# Patient Record
Sex: FEMALE | Race: WHITE | Employment: OTHER | URBAN - NONMETROPOLITAN AREA
[De-identification: names, ages, dates, MRNs, and addresses within clinical notes are randomized per-mention and may not be internally consistent; named-entity substitution may affect disease eponyms.]

---

## 2017-01-12 DIAGNOSIS — J44.9 COPD (CHRONIC OBSTRUCTIVE PULMONARY DISEASE) (HCC): ICD-10-CM

## 2017-01-13 RX ORDER — BUDESONIDE AND FORMOTEROL FUMARATE DIHYDRATE 160; 4.5 UG/1; UG/1
AEROSOL RESPIRATORY (INHALATION)
Qty: 10.2 INHALER | Refills: 5 | Status: SHIPPED | OUTPATIENT
Start: 2017-01-13 | End: 2017-01-25 | Stop reason: SDUPTHER

## 2017-01-25 ENCOUNTER — OFFICE VISIT (OUTPATIENT)
Dept: FAMILY MEDICINE CLINIC | Age: 67
End: 2017-01-25
Payer: MEDICARE

## 2017-01-25 VITALS
TEMPERATURE: 98.3 F | SYSTOLIC BLOOD PRESSURE: 97 MMHG | DIASTOLIC BLOOD PRESSURE: 58 MMHG | HEART RATE: 86 BPM | OXYGEN SATURATION: 98 % | BODY MASS INDEX: 16.39 KG/M2 | RESPIRATION RATE: 16 BRPM | WEIGHT: 76 LBS | HEIGHT: 57 IN

## 2017-01-25 DIAGNOSIS — M54.9 CHRONIC BACK PAIN, UNSPECIFIED BACK LOCATION, UNSPECIFIED BACK PAIN LATERALITY: Primary | ICD-10-CM

## 2017-01-25 DIAGNOSIS — R64 CACHEXIA (HCC): ICD-10-CM

## 2017-01-25 DIAGNOSIS — J41.1 MUCOPURULENT CHRONIC BRONCHITIS (HCC): ICD-10-CM

## 2017-01-25 DIAGNOSIS — G89.29 CHRONIC BACK PAIN, UNSPECIFIED BACK LOCATION, UNSPECIFIED BACK PAIN LATERALITY: Primary | ICD-10-CM

## 2017-01-25 PROCEDURE — G8400 PT W/DXA NO RESULTS DOC: HCPCS | Performed by: FAMILY MEDICINE

## 2017-01-25 PROCEDURE — 3017F COLORECTAL CA SCREEN DOC REV: CPT | Performed by: FAMILY MEDICINE

## 2017-01-25 PROCEDURE — G8926 SPIRO NO PERF OR DOC: HCPCS | Performed by: FAMILY MEDICINE

## 2017-01-25 PROCEDURE — 1090F PRES/ABSN URINE INCON ASSESS: CPT | Performed by: FAMILY MEDICINE

## 2017-01-25 PROCEDURE — G8598 ASA/ANTIPLAT THER USED: HCPCS | Performed by: FAMILY MEDICINE

## 2017-01-25 PROCEDURE — 4040F PNEUMOC VAC/ADMIN/RCVD: CPT | Performed by: FAMILY MEDICINE

## 2017-01-25 PROCEDURE — G8419 CALC BMI OUT NRM PARAM NOF/U: HCPCS | Performed by: FAMILY MEDICINE

## 2017-01-25 PROCEDURE — G8484 FLU IMMUNIZE NO ADMIN: HCPCS | Performed by: FAMILY MEDICINE

## 2017-01-25 PROCEDURE — 1123F ACP DISCUSS/DSCN MKR DOCD: CPT | Performed by: FAMILY MEDICINE

## 2017-01-25 PROCEDURE — G8427 DOCREV CUR MEDS BY ELIG CLIN: HCPCS | Performed by: FAMILY MEDICINE

## 2017-01-25 PROCEDURE — 3014F SCREEN MAMMO DOC REV: CPT | Performed by: FAMILY MEDICINE

## 2017-01-25 PROCEDURE — 4004F PT TOBACCO SCREEN RCVD TLK: CPT | Performed by: FAMILY MEDICINE

## 2017-01-25 PROCEDURE — 99214 OFFICE O/P EST MOD 30 MIN: CPT | Performed by: FAMILY MEDICINE

## 2017-01-25 PROCEDURE — 3023F SPIROM DOC REV: CPT | Performed by: FAMILY MEDICINE

## 2017-01-25 RX ORDER — HYDROCODONE BITARTRATE AND ACETAMINOPHEN 10; 325 MG/1; MG/1
1 TABLET ORAL EVERY 8 HOURS PRN
Qty: 90 TABLET | Refills: 0 | Status: SHIPPED | OUTPATIENT
Start: 2017-01-25 | End: 2017-02-24 | Stop reason: SDUPTHER

## 2017-01-25 ASSESSMENT — ENCOUNTER SYMPTOMS
EYES NEGATIVE: 1
GASTROINTESTINAL NEGATIVE: 1
RESPIRATORY NEGATIVE: 1
ALLERGIC/IMMUNOLOGIC NEGATIVE: 1
BACK PAIN: 1

## 2017-02-06 DIAGNOSIS — E78.5 HYPERLIPIDEMIA: ICD-10-CM

## 2017-02-06 RX ORDER — SIMVASTATIN 40 MG
TABLET ORAL
Qty: 30 TABLET | Refills: 5 | Status: SHIPPED | OUTPATIENT
Start: 2017-02-06 | End: 2017-02-24 | Stop reason: SDUPTHER

## 2017-02-24 ENCOUNTER — OFFICE VISIT (OUTPATIENT)
Dept: FAMILY MEDICINE CLINIC | Age: 67
End: 2017-02-24
Payer: MEDICARE

## 2017-02-24 VITALS
WEIGHT: 77 LBS | HEART RATE: 111 BPM | TEMPERATURE: 98.9 F | RESPIRATION RATE: 16 BRPM | OXYGEN SATURATION: 93 % | DIASTOLIC BLOOD PRESSURE: 70 MMHG | BODY MASS INDEX: 16.61 KG/M2 | HEIGHT: 57 IN | SYSTOLIC BLOOD PRESSURE: 126 MMHG

## 2017-02-24 DIAGNOSIS — M54.5 CHRONIC LOW BACK PAIN, UNSPECIFIED BACK PAIN LATERALITY, WITH SCIATICA PRESENCE UNSPECIFIED: Primary | ICD-10-CM

## 2017-02-24 DIAGNOSIS — G89.29 CHRONIC LOW BACK PAIN, UNSPECIFIED BACK PAIN LATERALITY, WITH SCIATICA PRESENCE UNSPECIFIED: Primary | ICD-10-CM

## 2017-02-24 PROCEDURE — 4040F PNEUMOC VAC/ADMIN/RCVD: CPT | Performed by: FAMILY MEDICINE

## 2017-02-24 PROCEDURE — 3017F COLORECTAL CA SCREEN DOC REV: CPT | Performed by: FAMILY MEDICINE

## 2017-02-24 PROCEDURE — 1123F ACP DISCUSS/DSCN MKR DOCD: CPT | Performed by: FAMILY MEDICINE

## 2017-02-24 PROCEDURE — G8400 PT W/DXA NO RESULTS DOC: HCPCS | Performed by: FAMILY MEDICINE

## 2017-02-24 PROCEDURE — G8427 DOCREV CUR MEDS BY ELIG CLIN: HCPCS | Performed by: FAMILY MEDICINE

## 2017-02-24 PROCEDURE — G8598 ASA/ANTIPLAT THER USED: HCPCS | Performed by: FAMILY MEDICINE

## 2017-02-24 PROCEDURE — 1090F PRES/ABSN URINE INCON ASSESS: CPT | Performed by: FAMILY MEDICINE

## 2017-02-24 PROCEDURE — 99213 OFFICE O/P EST LOW 20 MIN: CPT | Performed by: FAMILY MEDICINE

## 2017-02-24 PROCEDURE — 4004F PT TOBACCO SCREEN RCVD TLK: CPT | Performed by: FAMILY MEDICINE

## 2017-02-24 PROCEDURE — G8419 CALC BMI OUT NRM PARAM NOF/U: HCPCS | Performed by: FAMILY MEDICINE

## 2017-02-24 PROCEDURE — 3014F SCREEN MAMMO DOC REV: CPT | Performed by: FAMILY MEDICINE

## 2017-02-24 PROCEDURE — G8484 FLU IMMUNIZE NO ADMIN: HCPCS | Performed by: FAMILY MEDICINE

## 2017-02-24 RX ORDER — HYDROCODONE BITARTRATE AND ACETAMINOPHEN 10; 325 MG/1; MG/1
1 TABLET ORAL EVERY 8 HOURS PRN
Qty: 90 TABLET | Refills: 0 | Status: SHIPPED | OUTPATIENT
Start: 2017-02-24 | End: 2017-03-23 | Stop reason: SDUPTHER

## 2017-02-24 ASSESSMENT — ENCOUNTER SYMPTOMS
ALLERGIC/IMMUNOLOGIC NEGATIVE: 1
BACK PAIN: 1
RESPIRATORY NEGATIVE: 1
GASTROINTESTINAL NEGATIVE: 1
EYES NEGATIVE: 1

## 2017-03-23 ENCOUNTER — OFFICE VISIT (OUTPATIENT)
Dept: FAMILY MEDICINE CLINIC | Age: 67
End: 2017-03-23
Payer: MEDICARE

## 2017-03-23 VITALS
TEMPERATURE: 98 F | BODY MASS INDEX: 16.66 KG/M2 | WEIGHT: 77 LBS | SYSTOLIC BLOOD PRESSURE: 132 MMHG | HEART RATE: 83 BPM | DIASTOLIC BLOOD PRESSURE: 70 MMHG | OXYGEN SATURATION: 95 %

## 2017-03-23 DIAGNOSIS — M54.5 CHRONIC LOW BACK PAIN, UNSPECIFIED BACK PAIN LATERALITY, WITH SCIATICA PRESENCE UNSPECIFIED: Primary | ICD-10-CM

## 2017-03-23 DIAGNOSIS — G89.29 CHRONIC LOW BACK PAIN, UNSPECIFIED BACK PAIN LATERALITY, WITH SCIATICA PRESENCE UNSPECIFIED: Primary | ICD-10-CM

## 2017-03-23 PROCEDURE — G8484 FLU IMMUNIZE NO ADMIN: HCPCS | Performed by: FAMILY MEDICINE

## 2017-03-23 PROCEDURE — G8598 ASA/ANTIPLAT THER USED: HCPCS | Performed by: FAMILY MEDICINE

## 2017-03-23 PROCEDURE — G8427 DOCREV CUR MEDS BY ELIG CLIN: HCPCS | Performed by: FAMILY MEDICINE

## 2017-03-23 PROCEDURE — 3017F COLORECTAL CA SCREEN DOC REV: CPT | Performed by: FAMILY MEDICINE

## 2017-03-23 PROCEDURE — 1090F PRES/ABSN URINE INCON ASSESS: CPT | Performed by: FAMILY MEDICINE

## 2017-03-23 PROCEDURE — 1123F ACP DISCUSS/DSCN MKR DOCD: CPT | Performed by: FAMILY MEDICINE

## 2017-03-23 PROCEDURE — 4040F PNEUMOC VAC/ADMIN/RCVD: CPT | Performed by: FAMILY MEDICINE

## 2017-03-23 PROCEDURE — G8400 PT W/DXA NO RESULTS DOC: HCPCS | Performed by: FAMILY MEDICINE

## 2017-03-23 PROCEDURE — 4004F PT TOBACCO SCREEN RCVD TLK: CPT | Performed by: FAMILY MEDICINE

## 2017-03-23 PROCEDURE — G8419 CALC BMI OUT NRM PARAM NOF/U: HCPCS | Performed by: FAMILY MEDICINE

## 2017-03-23 PROCEDURE — 99213 OFFICE O/P EST LOW 20 MIN: CPT | Performed by: FAMILY MEDICINE

## 2017-03-23 PROCEDURE — 3014F SCREEN MAMMO DOC REV: CPT | Performed by: FAMILY MEDICINE

## 2017-03-23 RX ORDER — HYDROCODONE BITARTRATE AND ACETAMINOPHEN 10; 325 MG/1; MG/1
1 TABLET ORAL EVERY 8 HOURS PRN
Qty: 90 TABLET | Refills: 0 | Status: SHIPPED | OUTPATIENT
Start: 2017-03-23 | End: 2017-04-21 | Stop reason: SDUPTHER

## 2017-03-23 ASSESSMENT — ENCOUNTER SYMPTOMS
EYES NEGATIVE: 1
BACK PAIN: 1
RESPIRATORY NEGATIVE: 1
ALLERGIC/IMMUNOLOGIC NEGATIVE: 1
GASTROINTESTINAL NEGATIVE: 1

## 2017-04-18 RX ORDER — CELECOXIB 200 MG/1
CAPSULE ORAL
Qty: 90 CAPSULE | Refills: 0 | Status: SHIPPED | OUTPATIENT
Start: 2017-04-18 | End: 2017-07-16 | Stop reason: SDUPTHER

## 2017-04-21 ENCOUNTER — OFFICE VISIT (OUTPATIENT)
Dept: FAMILY MEDICINE CLINIC | Age: 67
End: 2017-04-21
Payer: MEDICARE

## 2017-04-21 VITALS
TEMPERATURE: 98.5 F | SYSTOLIC BLOOD PRESSURE: 138 MMHG | WEIGHT: 78 LBS | DIASTOLIC BLOOD PRESSURE: 80 MMHG | HEART RATE: 87 BPM | OXYGEN SATURATION: 98 % | BODY MASS INDEX: 16.88 KG/M2

## 2017-04-21 DIAGNOSIS — M54.5 CHRONIC LOW BACK PAIN, UNSPECIFIED BACK PAIN LATERALITY, WITH SCIATICA PRESENCE UNSPECIFIED: Primary | ICD-10-CM

## 2017-04-21 DIAGNOSIS — G89.29 CHRONIC LOW BACK PAIN, UNSPECIFIED BACK PAIN LATERALITY, WITH SCIATICA PRESENCE UNSPECIFIED: Primary | ICD-10-CM

## 2017-04-21 DIAGNOSIS — J30.2 SEASONAL ALLERGIC RHINITIS, UNSPECIFIED ALLERGIC RHINITIS TRIGGER: ICD-10-CM

## 2017-04-21 PROCEDURE — G8400 PT W/DXA NO RESULTS DOC: HCPCS | Performed by: FAMILY MEDICINE

## 2017-04-21 PROCEDURE — 99214 OFFICE O/P EST MOD 30 MIN: CPT | Performed by: FAMILY MEDICINE

## 2017-04-21 PROCEDURE — 4040F PNEUMOC VAC/ADMIN/RCVD: CPT | Performed by: FAMILY MEDICINE

## 2017-04-21 PROCEDURE — 3014F SCREEN MAMMO DOC REV: CPT | Performed by: FAMILY MEDICINE

## 2017-04-21 PROCEDURE — 3017F COLORECTAL CA SCREEN DOC REV: CPT | Performed by: FAMILY MEDICINE

## 2017-04-21 PROCEDURE — 4004F PT TOBACCO SCREEN RCVD TLK: CPT | Performed by: FAMILY MEDICINE

## 2017-04-21 PROCEDURE — G8598 ASA/ANTIPLAT THER USED: HCPCS | Performed by: FAMILY MEDICINE

## 2017-04-21 PROCEDURE — G8427 DOCREV CUR MEDS BY ELIG CLIN: HCPCS | Performed by: FAMILY MEDICINE

## 2017-04-21 PROCEDURE — 1123F ACP DISCUSS/DSCN MKR DOCD: CPT | Performed by: FAMILY MEDICINE

## 2017-04-21 PROCEDURE — G8419 CALC BMI OUT NRM PARAM NOF/U: HCPCS | Performed by: FAMILY MEDICINE

## 2017-04-21 PROCEDURE — 1090F PRES/ABSN URINE INCON ASSESS: CPT | Performed by: FAMILY MEDICINE

## 2017-04-21 RX ORDER — HYDROCODONE BITARTRATE AND ACETAMINOPHEN 10; 325 MG/1; MG/1
1 TABLET ORAL EVERY 8 HOURS PRN
Qty: 90 TABLET | Refills: 0 | Status: SHIPPED | OUTPATIENT
Start: 2017-04-21 | End: 2017-05-22 | Stop reason: SDUPTHER

## 2017-04-21 ASSESSMENT — ENCOUNTER SYMPTOMS
EYES NEGATIVE: 1
ALLERGIC/IMMUNOLOGIC NEGATIVE: 1
RESPIRATORY NEGATIVE: 1
VOMITING: 0
GASTROINTESTINAL NEGATIVE: 1
NAUSEA: 0
BACK PAIN: 1

## 2017-05-10 DIAGNOSIS — J44.9 COPD (CHRONIC OBSTRUCTIVE PULMONARY DISEASE) (HCC): ICD-10-CM

## 2017-05-10 RX ORDER — ROFLUMILAST 500 UG/1
TABLET ORAL
Qty: 30 TABLET | Refills: 5 | Status: SHIPPED | OUTPATIENT
Start: 2017-05-10 | End: 2017-07-19 | Stop reason: SDUPTHER

## 2017-05-22 ENCOUNTER — OFFICE VISIT (OUTPATIENT)
Dept: FAMILY MEDICINE CLINIC | Age: 67
End: 2017-05-22
Payer: MEDICARE

## 2017-05-22 VITALS
BODY MASS INDEX: 16.83 KG/M2 | WEIGHT: 78 LBS | DIASTOLIC BLOOD PRESSURE: 88 MMHG | TEMPERATURE: 98.3 F | SYSTOLIC BLOOD PRESSURE: 126 MMHG | HEART RATE: 82 BPM | RESPIRATION RATE: 18 BRPM | HEIGHT: 57 IN | OXYGEN SATURATION: 97 %

## 2017-05-22 DIAGNOSIS — J44.1 COPD WITH ACUTE EXACERBATION (HCC): ICD-10-CM

## 2017-05-22 DIAGNOSIS — M54.5 CHRONIC LOW BACK PAIN, UNSPECIFIED BACK PAIN LATERALITY, WITH SCIATICA PRESENCE UNSPECIFIED: Primary | ICD-10-CM

## 2017-05-22 DIAGNOSIS — G89.29 CHRONIC LOW BACK PAIN, UNSPECIFIED BACK PAIN LATERALITY, WITH SCIATICA PRESENCE UNSPECIFIED: Primary | ICD-10-CM

## 2017-05-22 PROCEDURE — G8926 SPIRO NO PERF OR DOC: HCPCS | Performed by: FAMILY MEDICINE

## 2017-05-22 PROCEDURE — 4040F PNEUMOC VAC/ADMIN/RCVD: CPT | Performed by: FAMILY MEDICINE

## 2017-05-22 PROCEDURE — G8427 DOCREV CUR MEDS BY ELIG CLIN: HCPCS | Performed by: FAMILY MEDICINE

## 2017-05-22 PROCEDURE — G8400 PT W/DXA NO RESULTS DOC: HCPCS | Performed by: FAMILY MEDICINE

## 2017-05-22 PROCEDURE — 99214 OFFICE O/P EST MOD 30 MIN: CPT | Performed by: FAMILY MEDICINE

## 2017-05-22 PROCEDURE — 1090F PRES/ABSN URINE INCON ASSESS: CPT | Performed by: FAMILY MEDICINE

## 2017-05-22 PROCEDURE — 4004F PT TOBACCO SCREEN RCVD TLK: CPT | Performed by: FAMILY MEDICINE

## 2017-05-22 PROCEDURE — 1123F ACP DISCUSS/DSCN MKR DOCD: CPT | Performed by: FAMILY MEDICINE

## 2017-05-22 PROCEDURE — 3017F COLORECTAL CA SCREEN DOC REV: CPT | Performed by: FAMILY MEDICINE

## 2017-05-22 PROCEDURE — 3014F SCREEN MAMMO DOC REV: CPT | Performed by: FAMILY MEDICINE

## 2017-05-22 PROCEDURE — G8598 ASA/ANTIPLAT THER USED: HCPCS | Performed by: FAMILY MEDICINE

## 2017-05-22 PROCEDURE — 3023F SPIROM DOC REV: CPT | Performed by: FAMILY MEDICINE

## 2017-05-22 PROCEDURE — G8419 CALC BMI OUT NRM PARAM NOF/U: HCPCS | Performed by: FAMILY MEDICINE

## 2017-05-22 RX ORDER — AMOXICILLIN AND CLAVULANATE POTASSIUM 875; 125 MG/1; MG/1
1 TABLET, FILM COATED ORAL 2 TIMES DAILY
Qty: 20 TABLET | Refills: 0 | Status: SHIPPED | OUTPATIENT
Start: 2017-05-22 | End: 2018-02-15 | Stop reason: SDUPTHER

## 2017-05-22 RX ORDER — METHYLPREDNISOLONE 4 MG/1
TABLET ORAL
Qty: 1 KIT | Refills: 0 | Status: SHIPPED | OUTPATIENT
Start: 2017-05-22 | End: 2018-02-15 | Stop reason: SDUPTHER

## 2017-05-22 RX ORDER — HYDROCODONE BITARTRATE AND ACETAMINOPHEN 10; 325 MG/1; MG/1
1 TABLET ORAL EVERY 8 HOURS PRN
Qty: 90 TABLET | Refills: 0 | Status: SHIPPED | OUTPATIENT
Start: 2017-05-22 | End: 2017-06-21 | Stop reason: SDUPTHER

## 2017-05-22 ASSESSMENT — COPD QUESTIONNAIRES: COPD: 1

## 2017-05-22 ASSESSMENT — ENCOUNTER SYMPTOMS
ALLERGIC/IMMUNOLOGIC NEGATIVE: 1
DIFFICULTY BREATHING: 1
FREQUENT THROAT CLEARING: 1
VOMITING: 0
EYES NEGATIVE: 1
WHEEZING: 1
CHEST TIGHTNESS: 1
BACK PAIN: 1
GASTROINTESTINAL NEGATIVE: 1
COUGH: 1
SPUTUM PRODUCTION: 1
SHORTNESS OF BREATH: 1
NAUSEA: 0

## 2017-06-07 DIAGNOSIS — I10 ESSENTIAL HYPERTENSION: ICD-10-CM

## 2017-06-13 DIAGNOSIS — J30.9 ALLERGIC RHINITIS, UNSPECIFIED ALLERGIC RHINITIS TRIGGER, UNSPECIFIED RHINITIS SEASONALITY: ICD-10-CM

## 2017-06-13 RX ORDER — AZELASTINE 1 MG/ML
SPRAY, METERED NASAL
Qty: 1 BOTTLE | Refills: 5 | Status: ON HOLD | OUTPATIENT
Start: 2017-06-13 | End: 2017-10-29

## 2017-06-21 ENCOUNTER — OFFICE VISIT (OUTPATIENT)
Dept: FAMILY MEDICINE CLINIC | Age: 67
End: 2017-06-21
Payer: MEDICARE

## 2017-06-21 VITALS
OXYGEN SATURATION: 96 % | WEIGHT: 78 LBS | TEMPERATURE: 98.7 F | BODY MASS INDEX: 16.88 KG/M2 | SYSTOLIC BLOOD PRESSURE: 110 MMHG | HEART RATE: 86 BPM | DIASTOLIC BLOOD PRESSURE: 60 MMHG

## 2017-06-21 DIAGNOSIS — G89.29 CHRONIC LOW BACK PAIN, UNSPECIFIED BACK PAIN LATERALITY, WITH SCIATICA PRESENCE UNSPECIFIED: Primary | ICD-10-CM

## 2017-06-21 DIAGNOSIS — M54.5 CHRONIC LOW BACK PAIN, UNSPECIFIED BACK PAIN LATERALITY, WITH SCIATICA PRESENCE UNSPECIFIED: Primary | ICD-10-CM

## 2017-06-21 PROCEDURE — 4004F PT TOBACCO SCREEN RCVD TLK: CPT | Performed by: FAMILY MEDICINE

## 2017-06-21 PROCEDURE — 1090F PRES/ABSN URINE INCON ASSESS: CPT | Performed by: FAMILY MEDICINE

## 2017-06-21 PROCEDURE — 99213 OFFICE O/P EST LOW 20 MIN: CPT | Performed by: FAMILY MEDICINE

## 2017-06-21 PROCEDURE — G8419 CALC BMI OUT NRM PARAM NOF/U: HCPCS | Performed by: FAMILY MEDICINE

## 2017-06-21 PROCEDURE — 1123F ACP DISCUSS/DSCN MKR DOCD: CPT | Performed by: FAMILY MEDICINE

## 2017-06-21 PROCEDURE — G8400 PT W/DXA NO RESULTS DOC: HCPCS | Performed by: FAMILY MEDICINE

## 2017-06-21 PROCEDURE — G8427 DOCREV CUR MEDS BY ELIG CLIN: HCPCS | Performed by: FAMILY MEDICINE

## 2017-06-21 PROCEDURE — 4040F PNEUMOC VAC/ADMIN/RCVD: CPT | Performed by: FAMILY MEDICINE

## 2017-06-21 PROCEDURE — 3017F COLORECTAL CA SCREEN DOC REV: CPT | Performed by: FAMILY MEDICINE

## 2017-06-21 PROCEDURE — G8598 ASA/ANTIPLAT THER USED: HCPCS | Performed by: FAMILY MEDICINE

## 2017-06-21 PROCEDURE — 3014F SCREEN MAMMO DOC REV: CPT | Performed by: FAMILY MEDICINE

## 2017-06-21 RX ORDER — HYDROCODONE BITARTRATE AND ACETAMINOPHEN 10; 325 MG/1; MG/1
1 TABLET ORAL EVERY 8 HOURS PRN
Qty: 90 TABLET | Refills: 0 | Status: SHIPPED | OUTPATIENT
Start: 2017-06-21 | End: 2017-07-19 | Stop reason: SDUPTHER

## 2017-06-21 ASSESSMENT — ENCOUNTER SYMPTOMS
GASTROINTESTINAL NEGATIVE: 1
ALLERGIC/IMMUNOLOGIC NEGATIVE: 1
COUGH: 1
BACK PAIN: 1
EYES NEGATIVE: 1

## 2017-07-06 DIAGNOSIS — E78.5 HYPERLIPIDEMIA: ICD-10-CM

## 2017-07-06 RX ORDER — SIMVASTATIN 40 MG
TABLET ORAL
Qty: 30 TABLET | Refills: 5 | Status: SHIPPED | OUTPATIENT
Start: 2017-07-06 | End: 2017-08-17 | Stop reason: SDUPTHER

## 2017-07-06 RX ORDER — BUDESONIDE AND FORMOTEROL FUMARATE DIHYDRATE 160; 4.5 UG/1; UG/1
AEROSOL RESPIRATORY (INHALATION)
Qty: 10.2 INHALER | Refills: 5 | Status: SHIPPED | OUTPATIENT
Start: 2017-07-06 | End: 2018-05-15 | Stop reason: SDUPTHER

## 2017-07-14 DIAGNOSIS — J44.9 ASTHMA WITH COPD (CHRONIC OBSTRUCTIVE PULMONARY DISEASE) (HCC): ICD-10-CM

## 2017-07-14 RX ORDER — ALBUTEROL SULFATE 90 UG/1
2 AEROSOL, METERED RESPIRATORY (INHALATION) EVERY 6 HOURS PRN
Qty: 25.5 INHALER | Refills: 5 | Status: SHIPPED | OUTPATIENT
Start: 2017-07-14 | End: 2018-03-08 | Stop reason: SDUPTHER

## 2017-07-17 RX ORDER — CELECOXIB 200 MG/1
CAPSULE ORAL
Qty: 90 CAPSULE | Refills: 3 | Status: ON HOLD | OUTPATIENT
Start: 2017-07-17 | End: 2017-11-04 | Stop reason: HOSPADM

## 2017-07-19 ENCOUNTER — OFFICE VISIT (OUTPATIENT)
Dept: FAMILY MEDICINE CLINIC | Age: 67
End: 2017-07-19
Payer: MEDICARE

## 2017-07-19 VITALS
BODY MASS INDEX: 16.88 KG/M2 | DIASTOLIC BLOOD PRESSURE: 66 MMHG | WEIGHT: 78 LBS | SYSTOLIC BLOOD PRESSURE: 118 MMHG | TEMPERATURE: 98.8 F | OXYGEN SATURATION: 98 % | HEART RATE: 86 BPM

## 2017-07-19 DIAGNOSIS — M54.5 CHRONIC LOW BACK PAIN, UNSPECIFIED BACK PAIN LATERALITY, WITH SCIATICA PRESENCE UNSPECIFIED: Primary | ICD-10-CM

## 2017-07-19 DIAGNOSIS — G89.29 CHRONIC LOW BACK PAIN, UNSPECIFIED BACK PAIN LATERALITY, WITH SCIATICA PRESENCE UNSPECIFIED: Primary | ICD-10-CM

## 2017-07-19 DIAGNOSIS — J44.9 CHRONIC OBSTRUCTIVE PULMONARY DISEASE, UNSPECIFIED COPD TYPE (HCC): ICD-10-CM

## 2017-07-19 PROCEDURE — G8400 PT W/DXA NO RESULTS DOC: HCPCS | Performed by: FAMILY MEDICINE

## 2017-07-19 PROCEDURE — 1123F ACP DISCUSS/DSCN MKR DOCD: CPT | Performed by: FAMILY MEDICINE

## 2017-07-19 PROCEDURE — G8427 DOCREV CUR MEDS BY ELIG CLIN: HCPCS | Performed by: FAMILY MEDICINE

## 2017-07-19 PROCEDURE — 3014F SCREEN MAMMO DOC REV: CPT | Performed by: FAMILY MEDICINE

## 2017-07-19 PROCEDURE — 1090F PRES/ABSN URINE INCON ASSESS: CPT | Performed by: FAMILY MEDICINE

## 2017-07-19 PROCEDURE — G8926 SPIRO NO PERF OR DOC: HCPCS | Performed by: FAMILY MEDICINE

## 2017-07-19 PROCEDURE — G8598 ASA/ANTIPLAT THER USED: HCPCS | Performed by: FAMILY MEDICINE

## 2017-07-19 PROCEDURE — 3017F COLORECTAL CA SCREEN DOC REV: CPT | Performed by: FAMILY MEDICINE

## 2017-07-19 PROCEDURE — 3023F SPIROM DOC REV: CPT | Performed by: FAMILY MEDICINE

## 2017-07-19 PROCEDURE — 4004F PT TOBACCO SCREEN RCVD TLK: CPT | Performed by: FAMILY MEDICINE

## 2017-07-19 PROCEDURE — G8419 CALC BMI OUT NRM PARAM NOF/U: HCPCS | Performed by: FAMILY MEDICINE

## 2017-07-19 PROCEDURE — 99213 OFFICE O/P EST LOW 20 MIN: CPT | Performed by: FAMILY MEDICINE

## 2017-07-19 PROCEDURE — 4040F PNEUMOC VAC/ADMIN/RCVD: CPT | Performed by: FAMILY MEDICINE

## 2017-07-19 RX ORDER — HYDROCODONE BITARTRATE AND ACETAMINOPHEN 10; 325 MG/1; MG/1
1 TABLET ORAL EVERY 8 HOURS PRN
Qty: 90 TABLET | Refills: 0 | Status: SHIPPED | OUTPATIENT
Start: 2017-07-19 | End: 2017-08-17 | Stop reason: SDUPTHER

## 2017-07-19 ASSESSMENT — COPD QUESTIONNAIRES: COPD: 1

## 2017-07-19 ASSESSMENT — ENCOUNTER SYMPTOMS
COUGH: 1
FREQUENT THROAT CLEARING: 1
ALLERGIC/IMMUNOLOGIC NEGATIVE: 1
SHORTNESS OF BREATH: 1
DIFFICULTY BREATHING: 1
WHEEZING: 1
EYES NEGATIVE: 1
SPUTUM PRODUCTION: 1
BACK PAIN: 1
GASTROINTESTINAL NEGATIVE: 1

## 2017-08-09 DIAGNOSIS — J44.9 CHRONIC OBSTRUCTIVE PULMONARY DISEASE, UNSPECIFIED COPD TYPE (HCC): ICD-10-CM

## 2017-08-09 RX ORDER — TIOTROPIUM BROMIDE 18 UG/1
CAPSULE ORAL; RESPIRATORY (INHALATION)
Qty: 30 CAPSULE | Refills: 5 | Status: SHIPPED | OUTPATIENT
Start: 2017-08-09 | End: 2018-03-16 | Stop reason: SDUPTHER

## 2017-08-17 ENCOUNTER — OFFICE VISIT (OUTPATIENT)
Dept: FAMILY MEDICINE CLINIC | Age: 67
End: 2017-08-17
Payer: MEDICARE

## 2017-08-17 VITALS
BODY MASS INDEX: 16.61 KG/M2 | OXYGEN SATURATION: 93 % | SYSTOLIC BLOOD PRESSURE: 102 MMHG | TEMPERATURE: 98.2 F | RESPIRATION RATE: 18 BRPM | HEART RATE: 82 BPM | DIASTOLIC BLOOD PRESSURE: 72 MMHG | WEIGHT: 77 LBS | HEIGHT: 57 IN

## 2017-08-17 DIAGNOSIS — M54.5 CHRONIC LOW BACK PAIN, UNSPECIFIED BACK PAIN LATERALITY, WITH SCIATICA PRESENCE UNSPECIFIED: Primary | ICD-10-CM

## 2017-08-17 DIAGNOSIS — J44.9 ASTHMA WITH COPD (CHRONIC OBSTRUCTIVE PULMONARY DISEASE) (HCC): ICD-10-CM

## 2017-08-17 DIAGNOSIS — G89.29 CHRONIC LOW BACK PAIN, UNSPECIFIED BACK PAIN LATERALITY, WITH SCIATICA PRESENCE UNSPECIFIED: Primary | ICD-10-CM

## 2017-08-17 PROCEDURE — 99213 OFFICE O/P EST LOW 20 MIN: CPT | Performed by: FAMILY MEDICINE

## 2017-08-17 PROCEDURE — G8926 SPIRO NO PERF OR DOC: HCPCS | Performed by: FAMILY MEDICINE

## 2017-08-17 PROCEDURE — G8427 DOCREV CUR MEDS BY ELIG CLIN: HCPCS | Performed by: FAMILY MEDICINE

## 2017-08-17 PROCEDURE — G8598 ASA/ANTIPLAT THER USED: HCPCS | Performed by: FAMILY MEDICINE

## 2017-08-17 PROCEDURE — 4004F PT TOBACCO SCREEN RCVD TLK: CPT | Performed by: FAMILY MEDICINE

## 2017-08-17 PROCEDURE — 1123F ACP DISCUSS/DSCN MKR DOCD: CPT | Performed by: FAMILY MEDICINE

## 2017-08-17 PROCEDURE — 3023F SPIROM DOC REV: CPT | Performed by: FAMILY MEDICINE

## 2017-08-17 PROCEDURE — 3017F COLORECTAL CA SCREEN DOC REV: CPT | Performed by: FAMILY MEDICINE

## 2017-08-17 PROCEDURE — 3014F SCREEN MAMMO DOC REV: CPT | Performed by: FAMILY MEDICINE

## 2017-08-17 PROCEDURE — G8400 PT W/DXA NO RESULTS DOC: HCPCS | Performed by: FAMILY MEDICINE

## 2017-08-17 PROCEDURE — 4040F PNEUMOC VAC/ADMIN/RCVD: CPT | Performed by: FAMILY MEDICINE

## 2017-08-17 PROCEDURE — G8419 CALC BMI OUT NRM PARAM NOF/U: HCPCS | Performed by: FAMILY MEDICINE

## 2017-08-17 PROCEDURE — 1090F PRES/ABSN URINE INCON ASSESS: CPT | Performed by: FAMILY MEDICINE

## 2017-08-17 RX ORDER — ALBUTEROL SULFATE 2.5 MG/3ML
2.5 SOLUTION RESPIRATORY (INHALATION) EVERY 6 HOURS PRN
Qty: 120 EACH | Refills: 5 | Status: SHIPPED | OUTPATIENT
Start: 2017-08-17 | End: 2018-05-15 | Stop reason: SDUPTHER

## 2017-08-17 RX ORDER — HYDROCODONE BITARTRATE AND ACETAMINOPHEN 10; 325 MG/1; MG/1
1 TABLET ORAL EVERY 8 HOURS PRN
Qty: 90 TABLET | Refills: 0 | Status: SHIPPED | OUTPATIENT
Start: 2017-08-17 | End: 2017-09-15 | Stop reason: SDUPTHER

## 2017-08-17 ASSESSMENT — ENCOUNTER SYMPTOMS
GASTROINTESTINAL NEGATIVE: 1
BACK PAIN: 1
EYES NEGATIVE: 1
ALLERGIC/IMMUNOLOGIC NEGATIVE: 1

## 2017-09-15 ENCOUNTER — OFFICE VISIT (OUTPATIENT)
Dept: FAMILY MEDICINE CLINIC | Age: 67
End: 2017-09-15
Payer: MEDICARE

## 2017-09-15 VITALS
SYSTOLIC BLOOD PRESSURE: 118 MMHG | BODY MASS INDEX: 16.4 KG/M2 | HEART RATE: 79 BPM | OXYGEN SATURATION: 96 % | DIASTOLIC BLOOD PRESSURE: 76 MMHG | TEMPERATURE: 98.1 F | WEIGHT: 75.8 LBS

## 2017-09-15 DIAGNOSIS — M54.5 CHRONIC LOW BACK PAIN, UNSPECIFIED BACK PAIN LATERALITY, WITH SCIATICA PRESENCE UNSPECIFIED: Primary | ICD-10-CM

## 2017-09-15 DIAGNOSIS — G89.29 CHRONIC LOW BACK PAIN, UNSPECIFIED BACK PAIN LATERALITY, WITH SCIATICA PRESENCE UNSPECIFIED: Primary | ICD-10-CM

## 2017-09-15 DIAGNOSIS — J44.9 ASTHMA WITH COPD (CHRONIC OBSTRUCTIVE PULMONARY DISEASE) (HCC): ICD-10-CM

## 2017-09-15 DIAGNOSIS — F32.0 MILD MAJOR DEPRESSION, SINGLE EPISODE (HCC): ICD-10-CM

## 2017-09-15 PROCEDURE — 4004F PT TOBACCO SCREEN RCVD TLK: CPT | Performed by: FAMILY MEDICINE

## 2017-09-15 PROCEDURE — G8400 PT W/DXA NO RESULTS DOC: HCPCS | Performed by: FAMILY MEDICINE

## 2017-09-15 PROCEDURE — G8926 SPIRO NO PERF OR DOC: HCPCS | Performed by: FAMILY MEDICINE

## 2017-09-15 PROCEDURE — G8427 DOCREV CUR MEDS BY ELIG CLIN: HCPCS | Performed by: FAMILY MEDICINE

## 2017-09-15 PROCEDURE — G8418 CALC BMI BLW LOW PARAM F/U: HCPCS | Performed by: FAMILY MEDICINE

## 2017-09-15 PROCEDURE — 3017F COLORECTAL CA SCREEN DOC REV: CPT | Performed by: FAMILY MEDICINE

## 2017-09-15 PROCEDURE — 1090F PRES/ABSN URINE INCON ASSESS: CPT | Performed by: FAMILY MEDICINE

## 2017-09-15 PROCEDURE — 3014F SCREEN MAMMO DOC REV: CPT | Performed by: FAMILY MEDICINE

## 2017-09-15 PROCEDURE — 4040F PNEUMOC VAC/ADMIN/RCVD: CPT | Performed by: FAMILY MEDICINE

## 2017-09-15 PROCEDURE — G8598 ASA/ANTIPLAT THER USED: HCPCS | Performed by: FAMILY MEDICINE

## 2017-09-15 PROCEDURE — 1123F ACP DISCUSS/DSCN MKR DOCD: CPT | Performed by: FAMILY MEDICINE

## 2017-09-15 PROCEDURE — 99213 OFFICE O/P EST LOW 20 MIN: CPT | Performed by: FAMILY MEDICINE

## 2017-09-15 PROCEDURE — 3023F SPIROM DOC REV: CPT | Performed by: FAMILY MEDICINE

## 2017-09-15 RX ORDER — ALBUTEROL SULFATE 2.5 MG/3ML
2.5 SOLUTION RESPIRATORY (INHALATION) EVERY 6 HOURS PRN
Qty: 120 EACH | Refills: 3 | Status: ON HOLD | OUTPATIENT
Start: 2017-09-15 | End: 2017-10-29

## 2017-09-15 RX ORDER — HYDROCODONE BITARTRATE AND ACETAMINOPHEN 10; 325 MG/1; MG/1
1 TABLET ORAL EVERY 8 HOURS PRN
Qty: 90 TABLET | Refills: 0 | Status: SHIPPED | OUTPATIENT
Start: 2017-09-15 | End: 2017-10-16 | Stop reason: SDUPTHER

## 2017-09-15 ASSESSMENT — ENCOUNTER SYMPTOMS
EYES NEGATIVE: 1
RESPIRATORY NEGATIVE: 1
BACK PAIN: 1
ALLERGIC/IMMUNOLOGIC NEGATIVE: 1
GASTROINTESTINAL NEGATIVE: 1

## 2017-10-16 ENCOUNTER — OFFICE VISIT (OUTPATIENT)
Dept: FAMILY MEDICINE CLINIC | Age: 67
End: 2017-10-16
Payer: MEDICARE

## 2017-10-16 VITALS
TEMPERATURE: 98.2 F | HEART RATE: 96 BPM | SYSTOLIC BLOOD PRESSURE: 118 MMHG | BODY MASS INDEX: 16.87 KG/M2 | HEIGHT: 57 IN | OXYGEN SATURATION: 94 % | RESPIRATION RATE: 16 BRPM | WEIGHT: 78.2 LBS | DIASTOLIC BLOOD PRESSURE: 72 MMHG

## 2017-10-16 DIAGNOSIS — E78.5 HYPERLIPIDEMIA, UNSPECIFIED HYPERLIPIDEMIA TYPE: ICD-10-CM

## 2017-10-16 DIAGNOSIS — M54.5 CHRONIC LOW BACK PAIN, UNSPECIFIED BACK PAIN LATERALITY, WITH SCIATICA PRESENCE UNSPECIFIED: Primary | ICD-10-CM

## 2017-10-16 DIAGNOSIS — Z23 NEED FOR INFLUENZA VACCINATION: ICD-10-CM

## 2017-10-16 DIAGNOSIS — G89.29 CHRONIC LOW BACK PAIN, UNSPECIFIED BACK PAIN LATERALITY, WITH SCIATICA PRESENCE UNSPECIFIED: Primary | ICD-10-CM

## 2017-10-16 PROCEDURE — 3017F COLORECTAL CA SCREEN DOC REV: CPT | Performed by: FAMILY MEDICINE

## 2017-10-16 PROCEDURE — 90662 IIV NO PRSV INCREASED AG IM: CPT | Performed by: FAMILY MEDICINE

## 2017-10-16 PROCEDURE — 1123F ACP DISCUSS/DSCN MKR DOCD: CPT | Performed by: FAMILY MEDICINE

## 2017-10-16 PROCEDURE — 4040F PNEUMOC VAC/ADMIN/RCVD: CPT | Performed by: FAMILY MEDICINE

## 2017-10-16 PROCEDURE — G8598 ASA/ANTIPLAT THER USED: HCPCS | Performed by: FAMILY MEDICINE

## 2017-10-16 PROCEDURE — G8418 CALC BMI BLW LOW PARAM F/U: HCPCS | Performed by: FAMILY MEDICINE

## 2017-10-16 PROCEDURE — G0008 ADMIN INFLUENZA VIRUS VAC: HCPCS | Performed by: FAMILY MEDICINE

## 2017-10-16 PROCEDURE — 4004F PT TOBACCO SCREEN RCVD TLK: CPT | Performed by: FAMILY MEDICINE

## 2017-10-16 PROCEDURE — G8484 FLU IMMUNIZE NO ADMIN: HCPCS | Performed by: FAMILY MEDICINE

## 2017-10-16 PROCEDURE — 3014F SCREEN MAMMO DOC REV: CPT | Performed by: FAMILY MEDICINE

## 2017-10-16 PROCEDURE — 1090F PRES/ABSN URINE INCON ASSESS: CPT | Performed by: FAMILY MEDICINE

## 2017-10-16 PROCEDURE — G8400 PT W/DXA NO RESULTS DOC: HCPCS | Performed by: FAMILY MEDICINE

## 2017-10-16 PROCEDURE — 99213 OFFICE O/P EST LOW 20 MIN: CPT | Performed by: FAMILY MEDICINE

## 2017-10-16 PROCEDURE — G8427 DOCREV CUR MEDS BY ELIG CLIN: HCPCS | Performed by: FAMILY MEDICINE

## 2017-10-16 RX ORDER — SIMVASTATIN 40 MG
TABLET ORAL
Qty: 30 TABLET | Refills: 5 | Status: SHIPPED | OUTPATIENT
Start: 2017-10-16 | End: 2018-01-17 | Stop reason: ALTCHOICE

## 2017-10-16 RX ORDER — HYDROCODONE BITARTRATE AND ACETAMINOPHEN 10; 325 MG/1; MG/1
1 TABLET ORAL EVERY 8 HOURS PRN
Qty: 90 TABLET | Refills: 0 | Status: ON HOLD | OUTPATIENT
Start: 2017-10-16 | End: 2017-11-04 | Stop reason: HOSPADM

## 2017-10-16 ASSESSMENT — ENCOUNTER SYMPTOMS
EYES NEGATIVE: 1
BACK PAIN: 1
RESPIRATORY NEGATIVE: 1
GASTROINTESTINAL NEGATIVE: 1
ALLERGIC/IMMUNOLOGIC NEGATIVE: 1

## 2017-10-16 NOTE — PROGRESS NOTES
Subjective:      Patient ID: Randy Cleary is a 69 y.o. female. Chief Complaint   Patient presents with    Back Pain     Patient is here for a follow up on back pain for medication refill    Discuss Labs     Patient needs labs ordered. She has been out of Cholesterol meds for about 3 months. Patient here for follow-up of multiple medical problems. #1 chronic low back pain. Patient have chronic low back pain. She had a problem for years. She describes her pain as aching. Over-the-counter NSAIDs in the sufficient to control pain. She takes Norco for pain control. Medication is effective. Denies medication side effects. Last pill was this morning. #2 hyperlipidemia  Patient have increased cholesterol. Risk factors include smoking and age. She stated that she will run out of her medication a couple month ago she been forgetting to asked for a refill. She have no have blood work in quite a while. She was compliant with medication use when she has it. She been compliant with diet. She continue to smoke. Denies medication side effects. Review of Systems   Constitutional: Negative. HENT: Negative. Eyes: Negative. Respiratory: Negative. Cardiovascular: Negative. Gastrointestinal: Negative. Endocrine: Negative. Genitourinary: Negative. Musculoskeletal: Positive for back pain. Skin: Negative. Allergic/Immunologic: Negative. Neurological: Negative. Hematological: Negative. Psychiatric/Behavioral: Negative. Objective:   Physical Exam   Constitutional: She is oriented to person, place, and time. She appears well-developed and well-nourished. HENT:   Head: Normocephalic and atraumatic. Right Ear: External ear normal.   Left Ear: External ear normal.   Nose: Nose normal.   Mouth/Throat: Oropharynx is clear and moist.   Eyes: Conjunctivae and EOM are normal. Pupils are equal, round, and reactive to light. Neck: Normal range of motion. Neck supple. Cardiovascular: Normal rate, regular rhythm, normal heart sounds and intact distal pulses. Pulmonary/Chest: Effort normal. She has decreased breath sounds. Abdominal: Soft. Bowel sounds are normal.   Musculoskeletal: Normal range of motion. Neurological: She is alert and oriented to person, place, and time. She has normal reflexes. Skin: Skin is warm and dry. Psychiatric: She has a normal mood and affect. Her behavior is normal. Judgment and thought content normal.   Vitals reviewed. Assessment:       1. Chronic low back pain, unspecified back pain laterality, with sciatica presence unspecified - stable  HYDROcodone-acetaminophen (NORCO)  MG per tablet   2. Hyperlipidemia, unspecified hyperlipidemia type -  Need labs  simvastatin (ZOCOR) 40 MG tablet    CBC    Comprehensive Metabolic Panel    Lipid Panel    Urinalysis             Plan: 1. Continue medication. Thea Morris. #2 refill medication. Order blood work. Follow-up next month.

## 2017-10-29 ENCOUNTER — APPOINTMENT (OUTPATIENT)
Dept: GENERAL RADIOLOGY | Age: 67
DRG: 956 | End: 2017-10-29
Payer: MEDICARE

## 2017-10-29 ENCOUNTER — HOSPITAL ENCOUNTER (INPATIENT)
Age: 67
LOS: 6 days | Discharge: NURSING, REHAB OR LONG-TERM CARE | DRG: 956 | End: 2017-11-04
Attending: FAMILY MEDICINE | Admitting: HOSPITALIST
Payer: MEDICARE

## 2017-10-29 ENCOUNTER — APPOINTMENT (OUTPATIENT)
Dept: CT IMAGING | Age: 67
DRG: 956 | End: 2017-10-29
Payer: MEDICARE

## 2017-10-29 DIAGNOSIS — E87.5 HYPERKALEMIA: ICD-10-CM

## 2017-10-29 DIAGNOSIS — T79.6XXA TRAUMATIC RHABDOMYOLYSIS, INITIAL ENCOUNTER (HCC): ICD-10-CM

## 2017-10-29 DIAGNOSIS — N17.9 ACUTE RENAL FAILURE, UNSPECIFIED ACUTE RENAL FAILURE TYPE (HCC): ICD-10-CM

## 2017-10-29 DIAGNOSIS — R77.8 ELEVATED TROPONIN: ICD-10-CM

## 2017-10-29 DIAGNOSIS — N39.0 URINARY TRACT INFECTION WITHOUT HEMATURIA, SITE UNSPECIFIED: ICD-10-CM
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
## 2017-10-29 PROBLEM — S72.001A CLOSED RIGHT HIP FRACTURE, INITIAL ENCOUNTER (HCC): Status: ACTIVE | Noted: 2017-10-29

## 2017-10-29 PROBLEM — N30.00 ACUTE CYSTITIS WITHOUT HEMATURIA: Status: ACTIVE | Noted: 2017-10-29

## 2017-10-29 PROBLEM — M62.82 RHABDOMYOLYSIS: Status: ACTIVE | Noted: 2017-10-29

## 2017-10-29 LAB
ALBUMIN SERPL-MCNC: 4.1 G/DL (ref 3.5–5.2)
ALP BLD-CCNC: 67 U/L (ref 35–104)
ALT SERPL-CCNC: 51 U/L (ref 5–33)
AMORPHOUS: ABNORMAL /HPF
ANION GAP SERPL CALCULATED.3IONS-SCNC: 31 MMOL/L (ref 7–19)
AST SERPL-CCNC: 116 U/L (ref 5–32)
BACTERIA: ABNORMAL /HPF
BASE EXCESS ARTERIAL: -9.4 MMOL/L (ref -2–2)
BASOPHILS ABSOLUTE: 0 K/UL (ref 0–0.2)
BASOPHILS RELATIVE PERCENT: 0.1 % (ref 0–1)
BILIRUB SERPL-MCNC: 0.5 MG/DL (ref 0.2–1.2)
BILIRUBIN URINE: ABNORMAL
BLOOD, URINE: ABNORMAL
BUN BLDV-MCNC: 128 MG/DL (ref 8–23)
CALCIUM SERPL-MCNC: 9.5 MG/DL (ref 8.8–10.2)
CARBOXYHEMOGLOBIN ARTERIAL: 2.2 % (ref 0–5)
CHLORIDE BLD-SCNC: 90 MMOL/L (ref 98–111)
CLARITY: ABNORMAL
CO2: 17 MMOL/L (ref 22–29)
COLOR: YELLOW
CREAT SERPL-MCNC: 3.4 MG/DL (ref 0.5–0.9)
EOSINOPHILS ABSOLUTE: 0 K/UL (ref 0–0.6)
EOSINOPHILS RELATIVE PERCENT: 0 % (ref 0–5)
EPITHELIAL CELLS, UA: ABNORMAL /HPF
GFR NON-AFRICAN AMERICAN: 13
GLUCOSE BLD-MCNC: 77 MG/DL
GLUCOSE BLD-MCNC: 77 MG/DL (ref 70–99)
GLUCOSE BLD-MCNC: 97 MG/DL (ref 74–109)
GLUCOSE URINE: NEGATIVE MG/DL
HCO3 ARTERIAL: 17.6 MMOL/L (ref 22–26)
HCT VFR BLD CALC: 34.4 % (ref 37–47)
HEMOGLOBIN, ART, EXTENDED: 9.4 G/DL (ref 12–16)
HEMOGLOBIN: 11.1 G/DL (ref 12–16)
KETONES, URINE: ABNORMAL MG/DL
LACTIC ACID: 1 MMOL/L (ref 0.5–1.9)
LACTIC ACID: 1.7 MMOL/L (ref 0.5–1.9)
LEUKOCYTE ESTERASE, URINE: ABNORMAL
LYMPHOCYTES ABSOLUTE: 0.3 K/UL (ref 1.1–4.5)
LYMPHOCYTES RELATIVE PERCENT: 1.6 % (ref 20–40)
MCH RBC QN AUTO: 30.3 PG (ref 27–31)
MCHC RBC AUTO-ENTMCNC: 32.3 G/DL (ref 33–37)
MCV RBC AUTO: 94 FL (ref 81–99)
METHEMOGLOBIN ARTERIAL: 1 %
MONOCYTES ABSOLUTE: 1.5 K/UL (ref 0–0.9)
MONOCYTES RELATIVE PERCENT: 9.2 % (ref 0–10)
NEUTROPHILS ABSOLUTE: 14.5 K/UL (ref 1.5–7.5)
NEUTROPHILS RELATIVE PERCENT: 88.9 % (ref 50–65)
NITRITE, URINE: NEGATIVE
O2 CONTENT ARTERIAL: 12.8 ML/DL
O2 SAT, ARTERIAL: 95.6 %
O2 THERAPY: ABNORMAL
PCO2 ARTERIAL: 42 MMHG (ref 35–45)
PDW BLD-RTO: 14 % (ref 11.5–14.5)
PERFORMED ON: NORMAL
PH ARTERIAL: 7.23 (ref 7.35–7.45)
PH UA: 5.5
PLATELET # BLD: 400 K/UL (ref 130–400)
PMV BLD AUTO: 10.2 FL (ref 9.4–12.3)
PO2 ARTERIAL: 98 MMHG (ref 80–100)
POTASSIUM SERPL-SCNC: 5.1 MMOL/L (ref 3.5–5)
POTASSIUM, WHOLE BLOOD: 4.5
PROTEIN UA: 100 MG/DL
RBC # BLD: 3.66 M/UL (ref 4.2–5.4)
RBC UA: ABNORMAL /HPF (ref 0–2)
SODIUM BLD-SCNC: 138 MMOL/L (ref 136–145)
SPECIFIC GRAVITY UA: 1.01
TOTAL CK: 1423 U/L (ref 26–192)
TOTAL PROTEIN: 7.5 G/DL (ref 6.6–8.7)
TROPONIN: 0.06 NG/ML (ref 0–0.03)
UROBILINOGEN, URINE: 0.2 E.U./DL
WBC # BLD: 16.4 K/UL (ref 4.8–10.8)
WBC UA: ABNORMAL /HPF (ref 0–5)
YEAST: ABNORMAL /HPF

## 2017-10-29 PROCEDURE — 85025 COMPLETE CBC W/AUTO DIFF WBC: CPT

## 2017-10-29 PROCEDURE — 94640 AIRWAY INHALATION TREATMENT: CPT

## 2017-10-29 PROCEDURE — 72131 CT LUMBAR SPINE W/O DYE: CPT

## 2017-10-29 PROCEDURE — 6360000002 HC RX W HCPCS: Performed by: EMERGENCY MEDICINE

## 2017-10-29 PROCEDURE — 81001 URINALYSIS AUTO W/SCOPE: CPT

## 2017-10-29 PROCEDURE — 36415 COLL VENOUS BLD VENIPUNCTURE: CPT

## 2017-10-29 PROCEDURE — 6370000000 HC RX 637 (ALT 250 FOR IP): Performed by: INTERNAL MEDICINE

## 2017-10-29 PROCEDURE — 72128 CT CHEST SPINE W/O DYE: CPT

## 2017-10-29 PROCEDURE — 2580000003 HC RX 258: Performed by: FAMILY MEDICINE

## 2017-10-29 PROCEDURE — 87040 BLOOD CULTURE FOR BACTERIA: CPT

## 2017-10-29 PROCEDURE — 84484 ASSAY OF TROPONIN QUANT: CPT

## 2017-10-29 PROCEDURE — 2500000003 HC RX 250 WO HCPCS: Performed by: FAMILY MEDICINE

## 2017-10-29 PROCEDURE — 84132 ASSAY OF SERUM POTASSIUM: CPT

## 2017-10-29 PROCEDURE — 83605 ASSAY OF LACTIC ACID: CPT

## 2017-10-29 PROCEDURE — 83874 ASSAY OF MYOGLOBIN: CPT

## 2017-10-29 PROCEDURE — 99291 CRITICAL CARE FIRST HOUR: CPT | Performed by: EMERGENCY MEDICINE

## 2017-10-29 PROCEDURE — 6370000000 HC RX 637 (ALT 250 FOR IP): Performed by: FAMILY MEDICINE

## 2017-10-29 PROCEDURE — 2700000000 HC OXYGEN THERAPY PER DAY

## 2017-10-29 PROCEDURE — 87086 URINE CULTURE/COLONY COUNT: CPT

## 2017-10-29 PROCEDURE — 80053 COMPREHEN METABOLIC PANEL: CPT

## 2017-10-29 PROCEDURE — 1210000000 HC MED SURG R&B

## 2017-10-29 PROCEDURE — 82948 REAGENT STRIP/BLOOD GLUCOSE: CPT

## 2017-10-29 PROCEDURE — 82803 BLOOD GASES ANY COMBINATION: CPT

## 2017-10-29 PROCEDURE — 70450 CT HEAD/BRAIN W/O DYE: CPT

## 2017-10-29 PROCEDURE — 93005 ELECTROCARDIOGRAM TRACING: CPT

## 2017-10-29 PROCEDURE — 36600 WITHDRAWAL OF ARTERIAL BLOOD: CPT

## 2017-10-29 PROCEDURE — 73502 X-RAY EXAM HIP UNI 2-3 VIEWS: CPT

## 2017-10-29 PROCEDURE — 2580000003 HC RX 258: Performed by: EMERGENCY MEDICINE

## 2017-10-29 PROCEDURE — 71010 XR CHEST PORTABLE: CPT

## 2017-10-29 PROCEDURE — 99291 CRITICAL CARE FIRST HOUR: CPT

## 2017-10-29 PROCEDURE — 99222 1ST HOSP IP/OBS MODERATE 55: CPT | Performed by: INTERNAL MEDICINE

## 2017-10-29 PROCEDURE — 2580000003 HC RX 258: Performed by: INTERNAL MEDICINE

## 2017-10-29 PROCEDURE — 82550 ASSAY OF CK (CPK): CPT

## 2017-10-29 PROCEDURE — 72125 CT NECK SPINE W/O DYE: CPT

## 2017-10-29 RX ORDER — 0.9 % SODIUM CHLORIDE 0.9 %
1000 INTRAVENOUS SOLUTION INTRAVENOUS ONCE
Status: COMPLETED | OUTPATIENT
Start: 2017-10-29 | End: 2017-10-29

## 2017-10-29 RX ORDER — MORPHINE SULFATE 10 MG/ML
2 INJECTION, SOLUTION INTRAMUSCULAR; INTRAVENOUS
Status: DISCONTINUED | OUTPATIENT
Start: 2017-10-29 | End: 2017-11-04 | Stop reason: HOSPADM

## 2017-10-29 RX ORDER — SODIUM CHLORIDE 9 MG/ML
INJECTION, SOLUTION INTRAVENOUS CONTINUOUS
Status: DISCONTINUED | OUTPATIENT
Start: 2017-10-29 | End: 2017-10-30

## 2017-10-29 RX ORDER — SODIUM CHLORIDE 0.9 % (FLUSH) 0.9 %
10 SYRINGE (ML) INJECTION EVERY 12 HOURS SCHEDULED
Status: DISCONTINUED | OUTPATIENT
Start: 2017-10-29 | End: 2017-10-31

## 2017-10-29 RX ORDER — PHENOL 1.4 %
600 AEROSOL, SPRAY (ML) MUCOUS MEMBRANE DAILY
Status: DISCONTINUED | OUTPATIENT
Start: 2017-10-30 | End: 2017-11-04 | Stop reason: HOSPADM

## 2017-10-29 RX ORDER — ONDANSETRON 2 MG/ML
4 INJECTION INTRAMUSCULAR; INTRAVENOUS EVERY 6 HOURS PRN
Status: DISCONTINUED | OUTPATIENT
Start: 2017-10-29 | End: 2017-11-04 | Stop reason: HOSPADM

## 2017-10-29 RX ORDER — MORPHINE SULFATE 10 MG/ML
4 INJECTION, SOLUTION INTRAMUSCULAR; INTRAVENOUS
Status: DISCONTINUED | OUTPATIENT
Start: 2017-10-29 | End: 2017-11-04 | Stop reason: HOSPADM

## 2017-10-29 RX ORDER — DEXTROSE MONOHYDRATE 25 G/50ML
12.5 INJECTION, SOLUTION INTRAVENOUS ONCE
Status: COMPLETED | OUTPATIENT
Start: 2017-10-29 | End: 2017-10-29

## 2017-10-29 RX ORDER — IPRATROPIUM BROMIDE AND ALBUTEROL SULFATE 2.5; .5 MG/3ML; MG/3ML
1 SOLUTION RESPIRATORY (INHALATION) EVERY 4 HOURS PRN
Status: DISCONTINUED | OUTPATIENT
Start: 2017-10-29 | End: 2017-10-29

## 2017-10-29 RX ORDER — ATORVASTATIN CALCIUM 20 MG/1
40 TABLET, FILM COATED ORAL NIGHTLY
Status: DISCONTINUED | OUTPATIENT
Start: 2017-10-29 | End: 2017-11-04 | Stop reason: HOSPADM

## 2017-10-29 RX ORDER — SODIUM POLYSTYRENE SULFONATE 15 G/60ML
15 SUSPENSION ORAL; RECTAL ONCE
Status: DISCONTINUED | OUTPATIENT
Start: 2017-10-29 | End: 2017-10-29

## 2017-10-29 RX ORDER — SODIUM CHLORIDE 9 MG/ML
INJECTION, SOLUTION INTRAVENOUS CONTINUOUS
Status: DISCONTINUED | OUTPATIENT
Start: 2017-10-29 | End: 2017-10-29

## 2017-10-29 RX ORDER — IPRATROPIUM BROMIDE AND ALBUTEROL SULFATE 2.5; .5 MG/3ML; MG/3ML
1 SOLUTION RESPIRATORY (INHALATION)
Status: DISCONTINUED | OUTPATIENT
Start: 2017-10-30 | End: 2017-11-04 | Stop reason: HOSPADM

## 2017-10-29 RX ORDER — SODIUM CHLORIDE 0.9 % (FLUSH) 0.9 %
10 SYRINGE (ML) INJECTION PRN
Status: DISCONTINUED | OUTPATIENT
Start: 2017-10-29 | End: 2017-11-04 | Stop reason: HOSPADM

## 2017-10-29 RX ADMIN — SODIUM BICARBONATE 50 MEQ: 84 INJECTION INTRAVENOUS at 16:18

## 2017-10-29 RX ADMIN — ATORVASTATIN CALCIUM 40 MG: 20 TABLET, FILM COATED ORAL at 21:42

## 2017-10-29 RX ADMIN — DEXTROSE MONOHYDRATE 12.5 G: 25 INJECTION, SOLUTION INTRAVENOUS at 16:17

## 2017-10-29 RX ADMIN — MOMETASONE FUROATE AND FORMOTEROL FUMARATE DIHYDRATE 2 PUFF: 200; 5 AEROSOL RESPIRATORY (INHALATION) at 23:20

## 2017-10-29 RX ADMIN — SODIUM CHLORIDE: 9 INJECTION, SOLUTION INTRAVENOUS at 21:42

## 2017-10-29 RX ADMIN — SODIUM CHLORIDE 1000 ML: 9 INJECTION, SOLUTION INTRAVENOUS at 13:56

## 2017-10-29 RX ADMIN — IPRATROPIUM BROMIDE AND ALBUTEROL SULFATE 1 AMPULE: .5; 3 SOLUTION RESPIRATORY (INHALATION) at 22:13

## 2017-10-29 RX ADMIN — CEFTRIAXONE 1 G: 1 INJECTION, SOLUTION INTRAVENOUS at 18:18

## 2017-10-29 RX ADMIN — SODIUM CHLORIDE 1000 ML: 9 INJECTION, SOLUTION INTRAVENOUS at 16:59

## 2017-10-29 RX ADMIN — INSULIN HUMAN 4 UNITS: 100 INJECTION, SOLUTION PARENTERAL at 16:21

## 2017-10-29 ASSESSMENT — PAIN SCALES - GENERAL: PAINLEVEL_OUTOF10: 4

## 2017-10-29 NOTE — ED NOTES
ASSESSMENT:    PT ALERT/ORIENTED X4. PUPILS EQUAL/REACTIVE    SKIN:  WARM/DRY PINK CAPILLARY REFILL < 2SECS    CARDIAC:  S1 S2 NOTED     LUNGS: CLEAR UPPER AND LOWER LOBES, RESPIRATIONS EVEN/UNLABORED     ABDOMEN: BOWEL SOUNDS NOTED UPPER AND LOWER QUADRANTS                     SOFT AND NONTENDER. EXTREMITIES:  BILATERAL DP AND PT AND NO EDEMA NOTED. NO DISTRESS NOTED. SIDE RAILS UP AND CALL LIGHT IN REACH.      Arielle Gold RN  10/29/17 7676

## 2017-10-29 NOTE — ED NOTES
ASSESSMENT:    PT ALERT/ORIENTED to place and person. PUPILS EQUAL/REACTIVE    SKIN:  WARM/DRY PINK CAPILLARY REFILL < 2SEC    CARDIAC:  S1 S2 NOTED     LUNGS: CLEAR UPPER AND LOWER LOBES, RESPIRATIONS EVEN/UNLABORED     ABDOMEN: BOWEL SOUNDS NOTED UPPER AND LOWER QUADRANTS                     SOFT AND NONTENDER. EXTREMITIES:  BILATERAL DP AND PT AND NO EDEMA NOTED. NO DISTRESS NOTED. SIDE RAILS UP AND CALL LIGHT IN REACH. Pt unable to answer most questions, is poor historian and can barely understand pts speech. EMS reports pts friend on scene states pts speech is normally much clearer than what it is today.      Charisse Hager RN  10/29/17 4011

## 2017-10-29 NOTE — ED PROVIDER NOTES
CURRENT MEDICATIONS       Previous Medications    ALBUTEROL (PROVENTIL) (2.5 MG/3ML) 0.083% NEBULIZER SOLUTION    Take 3 mLs by nebulization every 6 hours as needed for Wheezing or Shortness of Breath    ALBUTEROL (PROVENTIL) (2.5 MG/3ML) 0.083% NEBULIZER SOLUTION    Take 3 mLs by nebulization every 6 hours as needed for Wheezing    ALBUTEROL SULFATE HFA (PROAIR HFA) 108 (90 BASE) MCG/ACT INHALER    Inhale 2 puffs into the lungs every 6 hours as needed for Wheezing    ASPIRIN 81 MG TABLET    Take 81 mg by mouth daily. AZELASTINE (ASTELIN) 0.1 % NASAL SPRAY    INHALE 2 SPRAYS BY NASAL ROUTE EVERY DAY    BUDESONIDE-FORMOTEROL (SYMBICORT) 160-4.5 MCG/ACT AERO    INHALE 2 PUFFS INTO THE LUNGS TWICE A DAY    CALCIUM CARBONATE 600 MG TABS TABLET    Take 1 tablet by mouth daily. CELECOXIB (CELEBREX) 200 MG CAPSULE    TAKE 1 CAPSULE EVERY DAY    DIPHENHYDRAMINE (BENADRYL) 25 MG TABLET    Take 25 mg by mouth every 6 hours as needed for Itching. HYDROCODONE-ACETAMINOPHEN (NORCO)  MG PER TABLET    Take 1 tablet by mouth every 8 hours as needed for Pain . Earliest Fill Date: 10/16/17    METOPROLOL TARTRATE (LOPRESSOR) 25 MG TABLET    TAKE 1 TABLET BY MOUTH TWICE A DAY    MULTIPLE VITAMINS-CALCIUM (ONE-A-DAY WOMENS PO)    Take  by mouth.     ROFLUMILAST (DALIRESP) 500 MCG TABLET    TAKE 1 TABLET BY MOUTH EVERY DAY    SIMVASTATIN (ZOCOR) 40 MG TABLET    TAKE 1 TABLET BY MOUTH EVERY DAY    SPIRIVA HANDIHALER 18 MCG INHALATION CAPSULE    INHALE THE CONTENT OF 1 CAPSULE VIA HANDIHALER EVERY DAY       ALLERGIES     Codeine; Darvocet a500 [propoxyphene n-acetaminophen]; and Morphine    FAMILY HISTORY       Family History   Problem Relation Age of Onset    Stroke Mother     Osteoporosis Mother     Heart Disease Father     COPD Father     Osteoporosis Father     High Cholesterol Father     Colon Cancer Neg Hx     Colon Polyps Neg Hx     Esophageal Cancer Neg Hx     Liver Cancer Neg Hx     Liver Disease Neg Hx     Stomach Cancer Neg Hx     Rectal Cancer Neg Hx           SOCIAL HISTORY       Social History     Social History    Marital status:      Spouse name: N/A    Number of children: N/A    Years of education: N/A     Social History Main Topics    Smoking status: Current Some Day Smoker     Packs/day: 0.50     Types: Cigarettes    Smokeless tobacco: Never Used    Alcohol use Yes      Comment: occ    Drug use: No    Sexual activity: Not Asked     Other Topics Concern    None     Social History Narrative    None       SCREENINGS             PHYSICAL EXAM    (up to 7 for level 4, 8 or more for level 5)     ED Triage Vitals   BP Temp Temp Source Pulse Resp SpO2 Height Weight   10/29/17 1319 10/29/17 1313 10/29/17 1313 10/29/17 1313 10/29/17 1313 -- 10/29/17 1313 10/29/17 1313   (!) 88/58 97.1 °F (36.2 °C) Temporal 111 18  4' 2\" (1.27 m) 80 lb (36.3 kg)       Physical Exam   Constitutional: She appears lethargic. She appears cachectic. She has a sickly appearance. She appears ill. HENT:   Head: Normocephalic. Cardiovascular: Regular rhythm, normal heart sounds and intact distal pulses. Tachycardia present. Pulmonary/Chest: Effort normal. She has decreased breath sounds. She has no wheezes. She has no rhonchi. Abdominal: Soft. Bowel sounds are normal. She exhibits no distension and no mass. There is no tenderness. There is no rebound and no guarding. Musculoskeletal: Normal range of motion. She exhibits no edema or tenderness. Neurological: She appears lethargic. She is disoriented. No cranial nerve deficit or sensory deficit. GCS eye subscore is 4. GCS verbal subscore is 4. GCS motor subscore is 6. Reflex Scores:       Patellar reflexes are 1+ on the right side and 1+ on the left side. Skin: Skin is dry. She is not diaphoretic. There is pallor. Poor skin turgor   Psychiatric: Judgment normal. Her speech is delayed. She is slowed. She is not agitated.  Thought content is not 10/29/17 1417 10/29/17 1432 10/29/17 1447 10/29/17 1504   BP: (!) 87/58 89/62 86/66 (!) 77/57   Pulse: 102 104 120 135   Resp: 20 20 18 20   Temp:       TempSrc:       SpO2: 97% 95% 95% 95%   Weight:       Height:           MDM    Reassessment  3:10 PM care transferred to Dr. Carole Rachel    CONSULTS:  None    PROCEDURES:  Unless otherwise noted below, none     Procedures    FINAL IMPRESSION    No diagnosis found. DISPOSITION/PLAN   DISPOSITION     PATIENT REFERRED TO:  No follow-up provider specified.     DISCHARGE MEDICATIONS:  New Prescriptions    No medications on file          (Please note that portions of this note were completed with a voice recognition program.  Efforts were made to edit the dictations but occasionally words are mis-transcribed.)    Ashley Candelario MD (electronically signed)  Attending Emergency Physician          Fernanda Felton MD  10/29/17 2518

## 2017-10-29 NOTE — ED NOTES
Still unable to get an O2 sat on pt, have tried several different locations on the pts body. Pt cool, dry, and pink at this time.      Barney Durant RN  10/29/17 7122

## 2017-10-29 NOTE — Clinical Note
Patient Class: Inpatient [101]   REQUIRED: Diagnosis: Rhabdomyolysis [728.88. ICD-9-CM]   Estimated Length of Stay: Estimated stay of more than 2 midnights   Future Attending Provider: Isiah Segovia [[id number]]   Admitting Provider: Isiah Segovia [[id number]]   Preferred Department: [facility]

## 2017-10-29 NOTE — ED PROVIDER NOTES
[facility] EMERGENCY DEPT  eMERGENCY dEPARTMENT eNCOUnter      Pt Name: Linda Botello  MRN: [id number]  [place] 1950  Date of evaluation: 10/29/2017  Provider: Arleen Brown MD    CHIEF COMPLAINT       Chief Complaint   Patient presents with   [address] Demar[place] Razo in floor by friend, doens't know how long she was there     Assumed care at end of shift pending workup for fall. Will need admission for acute renal failure. Found down, unknown how long. At beginning of my shift, pt's BP consistently in 70s-80s. She has only had 250cc of IVF given bc in CT, delay in IV? IVF started. BP improved to 110 briefly, but then dropped again to 70. Pt given 2 L on pressure bag and BP now consistently in 120s. Pt dry mucous membranes, confused but oriented to self and location. Mild UTI. Abx given. PHYSICAL EXAM    (up to 7 for level 4, 8 or more for level 5)     ED Triage Vitals   BP Temp Temp Source Pulse Resp SpO2 Height Weight   10/29/17 1319 10/29/17 1313 10/29/17 1313 10/29/17 1313 10/29/17 1313 10/29/17 1417 10/29/17 1313 10/29/17 1313   (!) 88/58 97.1 °F (36.2 °C) Temporal 111 18 97 % 4' 2\" (1.27 m) 80 lb (36.3 kg)       Physical Exam    DIAGNOSTIC RESULTS     EKG: All EKG's are interpreted by the Emergency Department Physician who either signs or Co-signs this chart in the absence of a cardiologist.    Sinus tach rate 103. Hyperacute t waves laterally. RADIOLOGY:   Non-plain film images such as CT, Ultrasound and MRI are read by the radiologist. Plain radiographic images are visualized and preliminarily interpreted by the emergency physician with the below findings:      Interpretation per the Radiologist below, if available at the time of this note:    XR Chest Portable   Final Result   1. Chronic lung changes with no acute disease. Signed by Dr Rosmery Salinas on 10/29/2017 3:53 PM      CT Lumbar Spine WO Contrast   Final Result   1. Chronic compression fractures of L1 and, less so, L2-L3.    2. Degenerative Abnormal; Notable for the following:     WBC, UA 3-5 (*)     RBC, UA 3-5 (*)     Amorphous, UA 1+ (*)     Yeast, UA Rare (*)     All other components within normal limits   POCT GLUCOSE - Normal   CULTURE BLOOD #1   CULTURE BLOOD #2   URINE CULTURE   LACTIC ACID, PLASMA   MYOGLOBIN, SERUM   MYOGLOBIN, URINE   LACTIC ACID, PLASMA   POCT GLUCOSE       All other labs were within normal range or not returned as of this dictation. EMERGENCY DEPARTMENT COURSE and DIFFERENTIAL DIAGNOSIS/MDM:   Vitals:    Vitals:    10/29/17 1700 10/29/17 1703 10/29/17 1708 10/29/17 1716   BP:  110/70 120/76 120/72   Pulse:  114 128 118   Resp:   16 16   Temp: 97.6 °F (36.4 °C)      TempSrc: Temporal      SpO2:   97% 97%   Weight:       Height:           MDM      CONSULTS:  None  D/w Dr. Lane Saldana for admission. Pt to be admitted to ICU for hypotension, acute renal failure, rhabdo, tachycardia, confusion. PROCEDURES:  Unless otherwise noted below, none     Procedures    FINAL IMPRESSION      1. Fall, initial encounter    2. Traumatic rhabdomyolysis, initial encounter ([facility]    3. Acute renal failure, unspecified acute renal failure type ([facility]    4. Hyperkalemia    5. Elevated troponin    6. Urinary tract infection without hematuria, site unspecified          DISPOSITION/PLAN   DISPOSITION Decision To Admit    PATIENT REFERRED TO:  Tomas Conway MD  [address] [facility] [address] [phone]          CRITICAL CARE TIME   Total Critical Care time was 45 minutes, excluding separately reportable procedures. Time includes direct patient care, reassessing patient, documenting care, and coordinating care for the patient. Time also includes data interpretation of any lab tests or imaging that were performed. There was a high probability of clinically significant/life threatening deterioration in the patient's condition which required my urgent intervention.       DISCHARGE MEDICATIONS:  New Prescriptions    No medications on file (Please note that portions of this note were completed with a voice recognition program.  Efforts were made to edit the dictations but occasionally words are mis-transcribed.)    Edyta Urban MD (electronically signed)  Attending Emergency Physician            Edyta Urban MD  10/29/17 0758

## 2017-10-29 NOTE — ED TRIAGE NOTES
Pt was found by a family friend in the floor, pt was there for an unknown amount of time. Pt does not remember how she got there or how long she laid in the floor. Pt c/o neck pain.

## 2017-10-30 PROBLEM — G93.41 METABOLIC ENCEPHALOPATHY: Status: ACTIVE | Noted: 2017-10-30

## 2017-10-30 PROBLEM — N17.9 AKI (ACUTE KIDNEY INJURY) (HCC): Status: ACTIVE | Noted: 2017-10-30

## 2017-10-30 PROBLEM — E87.0 HYPERNATREMIA: Status: ACTIVE | Noted: 2017-10-30

## 2017-10-30 PROBLEM — R78.81 GRAM-POSITIVE BACTEREMIA: Status: ACTIVE | Noted: 2017-10-30

## 2017-10-30 LAB
ABO/RH: NORMAL
ANION GAP SERPL CALCULATED.3IONS-SCNC: 18 MMOL/L (ref 7–19)
ANION GAP SERPL CALCULATED.3IONS-SCNC: 20 MMOL/L (ref 7–19)
ANTIBODY SCREEN: NORMAL
BASOPHILS ABSOLUTE: 0 K/UL (ref 0–0.2)
BASOPHILS RELATIVE PERCENT: 0.1 % (ref 0–1)
BLOOD BANK DISPENSE STATUS: NORMAL
BLOOD BANK PRODUCT CODE: NORMAL
BPU ID: NORMAL
BUN BLDV-MCNC: 78 MG/DL (ref 8–23)
BUN BLDV-MCNC: 97 MG/DL (ref 8–23)
CALCIUM SERPL-MCNC: 7.7 MG/DL (ref 8.8–10.2)
CALCIUM SERPL-MCNC: 7.9 MG/DL (ref 8.8–10.2)
CHLORIDE BLD-SCNC: 114 MMOL/L (ref 98–111)
CHLORIDE BLD-SCNC: 117 MMOL/L (ref 98–111)
CO2: 20 MMOL/L (ref 22–29)
CO2: 20 MMOL/L (ref 22–29)
CREAT SERPL-MCNC: 1.9 MG/DL (ref 0.5–0.9)
CREAT SERPL-MCNC: 2.4 MG/DL (ref 0.5–0.9)
DESCRIPTION BLOOD BANK: NORMAL
EOSINOPHILS ABSOLUTE: 0 K/UL (ref 0–0.6)
EOSINOPHILS RELATIVE PERCENT: 0 % (ref 0–5)
GFR NON-AFRICAN AMERICAN: 20
GFR NON-AFRICAN AMERICAN: 26
GLUCOSE BLD-MCNC: 172 MG/DL (ref 74–109)
GLUCOSE BLD-MCNC: 68 MG/DL (ref 70–99)
GLUCOSE BLD-MCNC: 95 MG/DL (ref 74–109)
HCT VFR BLD CALC: 27.9 % (ref 37–47)
HEMOGLOBIN: 8.8 G/DL (ref 12–16)
INR BLD: 1.05 (ref 0.88–1.18)
LYMPHOCYTES ABSOLUTE: 0.1 K/UL (ref 1.1–4.5)
LYMPHOCYTES RELATIVE PERCENT: 1.3 % (ref 20–40)
MAGNESIUM: 2.8 MG/DL (ref 1.6–2.4)
MCH RBC QN AUTO: 30.3 PG (ref 27–31)
MCHC RBC AUTO-ENTMCNC: 31.5 G/DL (ref 33–37)
MCV RBC AUTO: 96.2 FL (ref 81–99)
MONOCYTES ABSOLUTE: 0.8 K/UL (ref 0–0.9)
MONOCYTES RELATIVE PERCENT: 7.7 % (ref 0–10)
NEUTROPHILS ABSOLUTE: 9.6 K/UL (ref 1.5–7.5)
NEUTROPHILS RELATIVE PERCENT: 90.6 % (ref 50–65)
PDW BLD-RTO: 14.3 % (ref 11.5–14.5)
PERFORMED ON: ABNORMAL
PHOSPHORUS: 6.3 MG/DL (ref 2.5–4.5)
PLATELET # BLD: 309 K/UL (ref 130–400)
PMV BLD AUTO: 10 FL (ref 9.4–12.3)
POTASSIUM SERPL-SCNC: 4.4 MMOL/L (ref 3.5–5)
POTASSIUM SERPL-SCNC: 5 MMOL/L (ref 3.5–5)
PROTHROMBIN TIME: 13.6 SEC (ref 12–14.6)
RBC # BLD: 2.9 M/UL (ref 4.2–5.4)
SODIUM BLD-SCNC: 154 MMOL/L (ref 136–145)
SODIUM BLD-SCNC: 155 MMOL/L (ref 136–145)
WBC # BLD: 10.6 K/UL (ref 4.8–10.8)

## 2017-10-30 PROCEDURE — 86850 RBC ANTIBODY SCREEN: CPT

## 2017-10-30 PROCEDURE — 86901 BLOOD TYPING SEROLOGIC RH(D): CPT

## 2017-10-30 PROCEDURE — 6360000002 HC RX W HCPCS: Performed by: HOSPITALIST

## 2017-10-30 PROCEDURE — 85610 PROTHROMBIN TIME: CPT

## 2017-10-30 PROCEDURE — 6360000002 HC RX W HCPCS: Performed by: INTERNAL MEDICINE

## 2017-10-30 PROCEDURE — 80048 BASIC METABOLIC PNL TOTAL CA: CPT

## 2017-10-30 PROCEDURE — 2580000003 HC RX 258: Performed by: INTERNAL MEDICINE

## 2017-10-30 PROCEDURE — 99233 SBSQ HOSP IP/OBS HIGH 50: CPT | Performed by: HOSPITALIST

## 2017-10-30 PROCEDURE — 82948 REAGENT STRIP/BLOOD GLUCOSE: CPT

## 2017-10-30 PROCEDURE — 1210000000 HC MED SURG R&B

## 2017-10-30 PROCEDURE — 6370000000 HC RX 637 (ALT 250 FOR IP): Performed by: INTERNAL MEDICINE

## 2017-10-30 PROCEDURE — 2580000003 HC RX 258: Performed by: HOSPITALIST

## 2017-10-30 PROCEDURE — 84100 ASSAY OF PHOSPHORUS: CPT

## 2017-10-30 PROCEDURE — 94640 AIRWAY INHALATION TREATMENT: CPT

## 2017-10-30 PROCEDURE — 36415 COLL VENOUS BLD VENIPUNCTURE: CPT

## 2017-10-30 PROCEDURE — 83735 ASSAY OF MAGNESIUM: CPT

## 2017-10-30 PROCEDURE — 86900 BLOOD TYPING SEROLOGIC ABO: CPT

## 2017-10-30 PROCEDURE — 85025 COMPLETE CBC W/AUTO DIFF WBC: CPT

## 2017-10-30 PROCEDURE — 2700000000 HC OXYGEN THERAPY PER DAY

## 2017-10-30 PROCEDURE — 51702 INSERT TEMP BLADDER CATH: CPT

## 2017-10-30 RX ORDER — LINEZOLID 2 MG/ML
600 INJECTION, SOLUTION INTRAVENOUS EVERY 12 HOURS
Status: DISCONTINUED | OUTPATIENT
Start: 2017-10-30 | End: 2017-10-31

## 2017-10-30 RX ORDER — ACETAMINOPHEN 325 MG/1
650 TABLET ORAL EVERY 4 HOURS PRN
Status: DISCONTINUED | OUTPATIENT
Start: 2017-10-30 | End: 2017-10-31

## 2017-10-30 RX ORDER — LINEZOLID 600 MG/1
600 TABLET, FILM COATED ORAL EVERY 12 HOURS SCHEDULED
Status: DISCONTINUED | OUTPATIENT
Start: 2017-10-30 | End: 2017-10-30

## 2017-10-30 RX ORDER — DIPHENHYDRAMINE HYDROCHLORIDE 50 MG/ML
25 INJECTION INTRAMUSCULAR; INTRAVENOUS EVERY 6 HOURS PRN
Status: DISCONTINUED | OUTPATIENT
Start: 2017-10-30 | End: 2017-11-04 | Stop reason: HOSPADM

## 2017-10-30 RX ORDER — 0.9 % SODIUM CHLORIDE 0.9 %
250 INTRAVENOUS SOLUTION INTRAVENOUS ONCE
Status: DISCONTINUED | OUTPATIENT
Start: 2017-10-30 | End: 2017-11-01

## 2017-10-30 RX ORDER — DEXTROSE MONOHYDRATE 50 MG/ML
INJECTION, SOLUTION INTRAVENOUS CONTINUOUS
Status: DISCONTINUED | OUTPATIENT
Start: 2017-10-30 | End: 2017-10-31

## 2017-10-30 RX ADMIN — IPRATROPIUM BROMIDE AND ALBUTEROL SULFATE 1 AMPULE: .5; 3 SOLUTION RESPIRATORY (INHALATION) at 18:50

## 2017-10-30 RX ADMIN — LINEZOLID 600 MG: 600 INJECTION, SOLUTION INTRAVENOUS at 16:38

## 2017-10-30 RX ADMIN — Medication 10 ML: at 09:17

## 2017-10-30 RX ADMIN — IPRATROPIUM BROMIDE AND ALBUTEROL SULFATE 1 AMPULE: .5; 3 SOLUTION RESPIRATORY (INHALATION) at 10:40

## 2017-10-30 RX ADMIN — CEFTRIAXONE 1 G: 1 INJECTION, SOLUTION INTRAVENOUS at 18:00

## 2017-10-30 RX ADMIN — SODIUM CHLORIDE: 9 INJECTION, SOLUTION INTRAVENOUS at 06:53

## 2017-10-30 RX ADMIN — MORPHINE SULFATE 2 MG: 10 INJECTION, SOLUTION INTRAMUSCULAR; INTRAVENOUS at 20:01

## 2017-10-30 RX ADMIN — IPRATROPIUM BROMIDE AND ALBUTEROL SULFATE 1 AMPULE: .5; 3 SOLUTION RESPIRATORY (INHALATION) at 15:30

## 2017-10-30 RX ADMIN — IPRATROPIUM BROMIDE AND ALBUTEROL SULFATE 1 AMPULE: .5; 3 SOLUTION RESPIRATORY (INHALATION) at 06:50

## 2017-10-30 RX ADMIN — MOMETASONE FUROATE AND FORMOTEROL FUMARATE DIHYDRATE 2 PUFF: 200; 5 AEROSOL RESPIRATORY (INHALATION) at 21:18

## 2017-10-30 RX ADMIN — MOMETASONE FUROATE AND FORMOTEROL FUMARATE DIHYDRATE 2 PUFF: 200; 5 AEROSOL RESPIRATORY (INHALATION) at 06:53

## 2017-10-30 RX ADMIN — DEXTROSE MONOHYDRATE: 50 INJECTION, SOLUTION INTRAVENOUS at 10:20

## 2017-10-30 ASSESSMENT — PAIN SCALES - GENERAL: PAINLEVEL_OUTOF10: 10

## 2017-10-30 NOTE — PROGRESS NOTES
Patient's sons at bedside. Treatment consent signed for [facility] surgery tomorrow. Her children report that Dr. Mariana Brush in [place] is her PCP. Home meds are not known. Son thinks she has her meds filled at [facility] in [address] [place] [address].  Electronically signed by Amna Graham RN on 10/30/2017 at 10:25 AM

## 2017-10-30 NOTE — PROGRESS NOTES
Dr. Taurus Mace notified of positive blood cultures.  Electronically signed by Humera Bailey RN on 10/30/2017 at 10:57 AM

## 2017-10-30 NOTE — PROGRESS NOTES
10/29/2017 10:08 PM - [place] [facility] Incoming Lab Results From [employer] Results     Component Value Ref Range & Units Status Collected Lab   pH, Arterial 7.230   7.350 - 7.450 Final 10/29/2017 10:07 PM [address] [place] [address] Lab   pCO2, Arterial 42.0  35.0 - 45.0 mmHg Final 10/29/2017 10:07 PM [address] [place] [address] Lab   pO2, Arterial 98.0  80.0 - 100.0 mmHg Final 10/29/2017 10:07 PM [address] [place] [address] Lab   HCO3, Arterial 17.6   22.0 - 26.0 mmol/L Final 10/29/2017 10:07 PM [facility] Excess, Arterial -9.4   -2.0 - 2.0 mmol/L Final 10/29/2017 10:07 PM [address] [place] [address] Lab   Hemoglobin, Art, Extended 9.4   12.0 - 16.0 g/dL Final 10/29/2017 10:07 PM [address] [place] [address] Lab   O2 Sat, Arterial 95.6  >92 % Final 10/29/2017 10:07 PM [address] [place] [address] Lab   Carboxyhgb, Arterial 2.2  0.0 - 5.0 % Final 10/29/2017 10:07 PM [facility] Lab        0.0-1.5   (Smokers 1.5-5.0)    Methemoglobin, Arterial 1.0  <1.5 % Final 10/29/2017 10:07 PM [address] [place] [address] Lab   O2 Content, Arterial 12.8  Not Established mL/dL Final 10/29/2017 10:07 PM [address] [place] [address] Lab   O2 Therapy Unknown   Final 10/29/2017 10:07 PM [address] [place] [address] Lab   Testing Performed By     Cathy [place]aiza Name Director Address Valid Date Range   [phone] LAB MD Kel Rice [address] [place] [address] 08/30/17 1233-Present   Narrative     CALL dr Gianna Bunn MD 3RD, 10/29/2017 22:08, by Alexa Coreas   Lab and Collection     Blood Gas, Arterial on 10/29/2017   Result History     Blood Gas, Arterial on 10/29/2017          Result Information     Flag: Abnormal Status: Final result (Collected: 10/29/2017 22:07) Provider Status: Ordered   Click to Print Result   View SmartLink Info     Blood Gas, Arterial (Order #[id number]) on 10/29/17   Blood Gas, Arterial [[id number]]     (Order #: [id number])   Reprint Requisition     Blood Gas,

## 2017-10-30 NOTE — PROGRESS NOTES
Nutrition Assessment    Type and Reason for Visit: Initial, Positive Nutrition Screen, Consult    Nutrition Recommendations: Will follow for course of treatment and diet advancement. Malnutrition Assessment:  · Malnutrition Status: Insufficient data  · Context: Acute illness or injury    Nutrition Diagnosis:   · Problem: Inadequate oral intake  · Etiology: related to Insufficient energy/nutrient consumption     Signs and symptoms:  as evidenced by Patient report of    Nutrition Assessment:  · Subjective Assessment: Pt NPO for surgery for fx hip tomorrow. Recent poor intake per chart. SLP consulted for c/s. · Wound Type: None  · Current Nutrition Therapies:  · Oral Diet Orders: NPO   · Oral Diet intake: NPO  · Oral Nutrition Supplement (ONS) Orders: None  · ONS intake: NPO  · Anthropometric Measures:  · Ht: 4' 2\" (127 cm)   · Current Body Wt: 77 lb 2 oz (35 kg)  · Admission Body Wt: 77 lb 2 oz (35 kg)  · BMI Classification: BMI 18.5 - 24.9 Normal Weight    Estimated Intake vs Estimated Needs: Intake Less Than Needs    Nutrition Risk Level: High    Nutrition Interventions:   Continue NPO, Start oral diet, Start ONS  Continued Inpatient Monitoring, Speech Therapy    Nutrition Evaluation:   · Evaluation: Goals set   · Goals: Pt will tolerate diet advancement. Intakes 50% or more of meals. No wt loss. · Monitoring: Diet Progression, NPO Status, Meal Intake, Supplement Intake, Weight, Pertinent Labs, Chewing/Swallowing    See Adult Nutrition Doc Flowsheet for more detail.      Electronically signed by Piero Roland, MS, RD, LD on 10/30/17 at 4:10 PM

## 2017-10-30 NOTE — PROGRESS NOTES
Patient is not alert or oriented x 4. Her children (two sons, Sotero Barclay [phone], Elio Backer [phone]) are at the bedside. Both are agreeable to surgery and blood if needed. Consent for blood transfusion signed and placed in patient's chart. Consent for surgery also signed and placed in patient's chart.     Electronically signed by Jad Barnes RN on 10/30/2017 at 3:04 PM

## 2017-10-30 NOTE — H&P
[facility] - History & Physical    [id number]  PCP: Betito Carpio MD  Date of Admission: 10/29/2017   Date of Service: Pt seen/examined on10/30/2017 and Admitted to Inpatient with expected LOS greater than two midnights due to medical therapy. Chief Complaint:  Fall    History Of Present Illness: The patient is a 69 y.o. female who was brought in by EMS after being found down by friend. History supplemented from chart review as pt unable to give reliable history. She was unable to describe how she ended up on floor or how long she had been there. Pt did mention some RLE pain when she was moving in bed. Denied any CP, SOB, abd pain, urinary symptoms. Review of Systems   Unable to perform ROS: Mental status change       Past Medical History:        Diagnosis Date    Allergic rhinitis     Asthma 12/9/14    Dr. Haleigh Jama Cerebral artery occlusion     Chronic back pain     COPD (chronic obstructive pulmonary disease) ([facility] [address]     Depression     Hyperlipidemia     Hypertension     Hyponatremia     Osteoarthritis     Osteoporosis        Past Surgical History:        Procedure Laterality Date    ANKLE FRACTURE SURGERY Right     COLONOSCOPY  10/07/2016    Dr. Luba Patricia Left     wrist    EYE SURGERY Left 01/05/2016    film removal on cataract    HYSTERECTOMY      IL COLONOSCOPY FLX DX W/COLLJ SPEC WHEN PFRMD N/A 10/7/2016    Dr Ludie Cooks Jones-incomplete due to extensive tortuosity and diverticulosis-BE normal, recall to be determined in office    TUBAL LIGATION         Home Medications:  Prior to Admission medications    Medication Sig Start Date End Date Taking?  Authorizing Provider   metoprolol tartrate (LOPRESSOR) 25 MG tablet Take 25 mg by mouth daily   Yes Historical Provider, MD   simvastatin (ZOCOR) 40 MG tablet TAKE 1 TABLET BY MOUTH EVERY DAY 10/16/17  Yes Betito Carpio MD   SPIRIVA HANDIHALER 18 MCG inhalation capsule INHALE THE CONTENT OF 1 CAPSULE VIA HANDIHALER EVERY DAY 8/9/17  Yes Juan A Quick MD   celecoxib (CELEBREX) 200 MG capsule TAKE 1 CAPSULE EVERY DAY 7/17/17  Yes Juan A Quick MD   albuterol sulfate HFA (PROAIR HFA) 108 (90 Base) MCG/ACT inhaler Inhale 2 puffs into the lungs every 6 hours as needed for Wheezing 7/14/17  Yes Juan A Quick MD   budesonide-formoterol (SYMBICORT) 160-4.5 MCG/ACT AERO INHALE 2 PUFFS INTO THE LUNGS TWICE A DAY 7/6/17  Yes Juan A Quick MD   calcium carbonate 600 MG TABS tablet Take 1 tablet by mouth daily. Yes Historical Provider, MD   Multiple Vitamins-Calcium (ONE-A-DAY WOMENS PO) Take  by mouth. Yes Historical Provider, MD   diphenhydrAMINE (BENADRYL) 25 MG tablet Take 25 mg by mouth every 6 hours as needed for Itching. Yes Historical Provider, MD   HYDROcodone-acetaminophen (NORCO)  MG per tablet Take 1 tablet by mouth every 8 hours as needed for Pain . Earliest Fill Date: 10/16/17 10/16/17   Juan A Quick MD   albuterol (PROVENTIL) (2.5 MG/3ML) 0.083% nebulizer solution Take 3 mLs by nebulization every 6 hours as needed for Wheezing or Shortness of Breath 8/17/17   Juan A Quick MD   aspirin 81 MG tablet Take 81 mg by mouth daily. Historical Provider, MD       Allergies:    Codeine; Darvocet a500 [propoxyphene n-acetaminophen]; and Morphine    Social History:    The patient currently lives at home alone  Tobacco:   reports that she has been smoking Cigarettes. She has been smoking about 0.50 packs per day. She has never used smokeless tobacco.  Alcohol:   reports that she drinks alcohol.   Illicit Drugs: denies    Family History:      Problem Relation Age of Onset    Stroke Mother     Osteoporosis Mother     Heart Disease Father     COPD Father     Osteoporosis Father     High Cholesterol Father     Colon Cancer Neg Hx     Colon Polyps Neg Hx     Esophageal Cancer Neg Hx     Liver Cancer Neg Hx     Liver Disease Neg Hx     Stomach Cancer Neg Hx     Rectal Cancer Neg Hx          Physical 1+ 10/29/2017    RBCUA 3-5 10/29/2017    BLOODU LARGE 10/29/2017    SPECGRAV 1.014 10/29/2017    GLUCOSEU Negative 10/29/2017     CT Thoracic Spine WO Contrast   Final Result   1. Osteopenia with chronic mid thoracic compression fractures. 2. No acute fracture is seen.        CT Cervical Spine WO Contrast   Final Result   1. Degenerative spine changes. 2. No acute fracture.       CT Head WO Contrast   Final Result   1. Age-related changes. 2. No acute intracranial abnormality. XR R Hip:  Impression:        Impression: Acute angulated comminuted intertrochanteric fracture of  the right femur. CXR  Impression:        1. Chronic lung changes with no acute disease. Active Hospital Problems    Diagnosis Date Noted    Closed right hip fracture, initial encounter ([facility] [address] [S72.001A] 10/29/2017    Rhabdomyolysis [M62.82] 10/29/2017    Fall [L99. XXXA] 10/29/2017    Acute cystitis without hematuria [N30.00] 10/29/2017    Hypertension [I10]     COPD (chronic obstructive pulmonary disease) ([facility]) [J44.9]        IMPRESSION   Principal Problem:    Closed right hip fracture, initial encounter ([facility])  Active Problems:    Hypertension    COPD (chronic obstructive pulmonary disease) ([facility])    Rhabdomyolysis    Fall    Acute cystitis without hematuria      PLAN:  1) Pain control, Appreciate ortho recs, NPO at MN  2) Cont IVF, recheck CPK lvls  3) Cont Abx, monitor mental status  4) Duonebs q4, supportive O2    Lexie Pérez MD  10/30/2017

## 2017-10-30 NOTE — PROGRESS NOTES
10/30/17 0917    sodium chloride flush 0.9 % injection 10 mL  10 mL Intravenous PRN Meera Briceño MD        magnesium hydroxide (MILK OF MAGNESIA) 400 MG/5ML suspension 30 mL  30 mL Oral Daily PRN Meera Briceño MD        ondansetron (ZOFRAN) injection 4 mg  4 mg Intravenous Q6H PRN Meera Briceño MD        morphine injection 2 mg  2 mg Intravenous Q2H PRN Meera Briceño MD        Or    [facility] morphine injection 4 mg  4 mg Intravenous Q2H PRN Meera Briceño MD        cefTRIAXone (ROCEPHIN) 1 g in 50 mL IVPB (premix)  1 g Intravenous Q24H Meera Briceño MD        ipratropium-albuterol (DUONEB) nebulizer solution 1 ampule  1 ampule Inhalation Q4H WA Meera Briceño MD   1 ampule at 10/30/17 1530        Objective:   Vitals: /62   Pulse 151   Temp 100.3 °F (37.9 °C) (Temporal)   Resp 26   Ht 4' 2\" (1.27 m)   Wt 77 lb 2 oz (35 kg)   SpO2 97%   BMI 21.69 kg/m²   24HR INTAKE/OUTPUT:      Intake/Output Summary (Last 24 hours) at 10/30/17 1557  Last data filed at 10/30/17 1501   Gross per 24 hour   Intake           1492.2 ml   Output              700 ml   Net            792.2 ml     Diet NPO, After Midnight  Last BM unknown  Physical Exam   Constitutional: She is oriented to person, place, and time. She appears well-developed. She appears cachectic. She has a sickly appearance. HENT:   Head: Normocephalic and atraumatic. Mouth/Throat: No oropharyngeal exudate. Eyes: Conjunctivae and EOM are normal. Pupils are equal, round, and reactive to light. No scleral icterus. Neck: Normal range of motion. Neck supple. No JVD present. Cardiovascular: Normal rate, regular rhythm, normal heart sounds and intact distal pulses. No murmur heard. Pulmonary/Chest: Effort normal and breath sounds normal. No respiratory distress. She has no wheezes. Abdominal: Soft. Bowel sounds are normal. She exhibits no distension. There is no tenderness. Musculoskeletal: Normal range of motion.  She exhibits no edema or deformity. Neurological: She is alert and oriented to person, place, and time. No cranial nerve deficit. Skin: Skin is warm and dry. There is pallor. Psychiatric: Her behavior is normal. Her speech is delayed and slurred. Cognition and memory are impaired. Labs:     Recent Labs      10/29/17   1335  10/30/17   0310   WBC  16.4*  10.6   RBC  3.66*  2.90*   HGB  11.1*  8.8*   HCT  34.4*  27.9*   MCV  94.0  96.2   MCH  30.3  30.3   MCHC  32.3*  31.5*   PLT  400  309     Recent Labs      10/29/17   1328  10/29/17   1335  10/29/17   2207  10/30/17   0310   NA   --   138   --   154*   K   --   5.1*  4.5  5.0   ANIONGAP   --   31*   --   20*   CL   --   90*   --   114*   CO2   --   17*   --   20*   BUN   --   128*   --   97*   CREATININE   --   3.4*   --   2.4*   GLUCOSE  77  97   --   95   CALCIUM   --   9.5   --   7.7*     Recent Labs      10/30/17   0310   MG  2.8*   PHOS  6.3*     Recent Labs      10/29/17   1335   AST  116*   ALT  51*   BILITOT  0.5   ALKPHOS  67     @LABRCNT (ABG:3)@  HgBA1c:  No components found for: HGBA1C  FLP:    Lab Results   Component Value Date    TRIG 91 08/30/2016    HDL 75 08/30/2016    LDLCALC 100 08/30/2016     TSH:    Lab Results   Component Value Date    TSH 1.01 08/30/2016     Troponin T:   Recent Labs      10/29/17   1335   TROPONINI  0.06*     INR:   Recent Labs      10/30/17   0310   INR  1.05       RAD:   Impression:        Impression: Acute angulated comminuted intertrochanteric fracture of  the right femur.   Signed by Dr Taras Lee on 10/29/2017 8:18 PM     Micro:   Blood Culture, Routine  (Abnormal) 10/29/2017  3:30 PM [facility]    (A)   Gram stain Aerobic bottle:   Gram positive cocci in chains and/or pairs   resembling Streptococcus   Culture in progress   Please notify Physician      Urine Culture, Routine 10/29/2017  4:40 PM [facility]   No growth at 36-48 hours            Impression / Plan: Principal Problem:    Closed right hip fracture, initial encounter--to OR tomorrow  Active Problems:    Hypernatremia--on D5W, recheck this afternoon    Metabolic encephalopathy--supportive care    Acute cystitis without hematuria--rocephin    FRANCIA--volume resuscitation    Hypertension--hypotensive earlier    COPD --no evidence of exacerbation    Rhabdomyolysis--IVF, trend CK    Fall with hip fracture--see above    Gram-positive bacteremia--zyvox due to renal function pending contaminant vs real    D/w Dr. Jenny Cash. Hip repair tomorrow.     Advance Directive: Full Code    DVT prophylaxis: lovenox  GI: none  Antibiotics: rocephin / zyvox  Discharge planning: RYAN Tariq DO  Internal Medicine Hospitalist

## 2017-10-30 NOTE — CONSULTS
VALERIE TURCIOS [facility] [address] [facility]                                 CONSULTATION    PATIENT NAME: Apple Alexander                    :             1950  MED REC NO:   [id number]                               ROOM:            [facility]  ACCOUNT NO:   [de-identified]                            ADMISSION DATE:  10/29/2017  PROVIDER:     Renetta Tineo Pa-C          CONSULT DATE:  10/30/2017    We were consulted on a patient. She was seen in the ER; found by her  friend. She had had a fall and denied any loss of consciousness, but  it is unclear as to how long she lay there. She was found by her  friend, taken to [facility] ER, we are consulted. The attending  physician is Dr. Medardo Viramontes. There is concern for a right-sided  intertrochanteric fracture at this time. HISTORY OF PRESENT ILLNESS/SUBJECTIVE:  The patient is a 75-year-old   female. She is in the hospital today. She is a poor  historian of slowed mentation, alert and oriented x1 to me. She is  complaining of some hip pain, but overall no other complaints at this  time. Duration at this time is also unclear as to again how long it  has been, when the fall occurred. No other pertinent positives and  negatives spoken to me today during this patient encounter. PAST MEDICAL HISTORY:  Per usual.    OBJECTIVE:  This patient appears her stated age. She is very  pleasant, alert to herself but not time or place. She is  appropriately dressed for this encounter, wearing a patient gown. No  acute anxiety or distress. This site itself where the patient fell on  her hip I do not see any ecchymosis, any bruising; do not palpate any  excessive heat; mild tenderness with deep palpation at this time. Again hard to gauge this patient's pain threshold and tolerance as she  is not extremely with it and not coherent.   As far as range of motion  is concerned; actively, the patient cannot really follow my commands  for this to be assessed for any of her lower appendages or any joints  today. Passively, this patient has a limited range of motion on the  hip as well. Again this is when I am able to actually elicit somewhat  of a response from the patient, she states \"ow. \"  I do not observe  full range of motion passively with the affected hip on flexion or  abduction, adduction as well as internal and external rotation. Extension of the hip today was not assessed as she was lying supine. There is a normal range of motion passively observed today on her  right knee as well as her right ankle today. In all, a complete  normal assessment and exam for the left lower appendage at this time. Again there is no extensive heat felt. I do not observe any change in  color. There is no crepitation or abnormal sounds, anything I hear  during the passive exercises. No effusion. No pitting edema observed  on this lower appendage in comparison to the left from my baseline. Dorsal pedal pulse is 2+ bilaterally. Good capillary refill of both  lower appendage nail beds today. No excessive warmth or heat palpated  throughout the lower appendages at this time. I was not really able  to assess any sensorineural deficits as again the patient is not able  to communicate with me very well. So neurovascular exam at this time  is not really able to be attained outside of what I palpate that could  elicit a painful response, which again mainly goes back to when I  assessed the range of motion for her right hip at this time. The skin  shows good perfusion. No cyanosis observed or cold is felt and again  the patency is suggested on dorsal pedal pulse, regular rate and  rhythm, not bounding. LABORATORY DATA:  X-rays today that were taken of the pelvis,  specifically right, 10/29/2017. We have 3 views today. We have an AP  view, we have a specific AP view focused on the right hip.   The first  AP view is of the pelvis and the last view is a lateral view  specifically of the right. I do appreciate on the both AP views today  an intertrochanteric fracture, what looks like it starts almost at the  neck and works its way downward through the lesser troch, through the  cortices and through the canal.  You can see this in comparison to the  left to kind of gauge and understand. Additionally, it looks like a  separation of the greater troch on the AP view of the pelvis. You can  see where there is a divide and radiolucent darkness kind of  dissecting through the greater troch on the AP view of the pelvis  today. I can still appreciate it somewhat from the AP view of the hip  alone. It is less of a wedge shape on this view, however. Again, I  see that white, opaque line going through the superior portion of the  lesser troch, again suggesting that full break that almost transects  through the entire canal and opposite cortical lining. I am unable to  really see a whole lot on the lateral view. At this time, I do see  where the cortices are the line is kind of broken, but I am not able  to see much more. There is quite a bit of artifact going on, but  again I cab see that there is clearly a fracture, what I would say  almost inferior to the neck itself again where that lesser troch is  located on this lateral view. ASSESSMENT:  I call this an intertrochanteric fracture of the right  hip or right femur. PLAN:  After some discussion with the nurse who is taking care of this  patient, they were able to contact people of sound mind, next of kin,  power of , the appropriate parties rather, to get an  appropriate consent. Additionally, we have had this patient n.p.o.  since last night.   We have been able to get everything appropriately  ordered for the surgical procedure, which will be a TFN placement,  short, and this patient will do very well from this procedure as we do  not suspect any poor blood flow to the head of the femur at this time. Current plan will be to operate on her  tomorrow afternoon which is Tuesday, 10/31/2017, and keep her  comfortable via IV and p.o. pain medicine at this time. As she is currently on NPO she may something to eat until midnight for clearance to have surgery in AM. I  do think this patient will do well long-term after completion of appropriate rehab. We will need to see how  she progresses as far as her mentation is concerned post op and with appropriate nutrition. I know she has  an elevated potassium, magnesium and also creatinine, we are concerned  about possible rhabdo at this time. It remains unclear as to how long the patient may have been down. Continue to assess what her mentation is after the surgery  and what it progresses to. I do believe she will need to be toe-touch  weightbearing on this for some time. Additionally, we are going to be  considering rehab at a skilled facility. It sounds like she is living by  herself and given the current mental status, she will need to be  overseen by a skilled staff, and we will evaluate over the next few days. Long term we may looking at permanent placement or possible home health whilst living with next of kin. These are decisions to being thinking about with her and family.             Juan R Hand PA-C    D: 10/30/2017 14:04:07       T: 10/30/2017 17:24:27     [email or web address]  Job#: [id number]     Doc#: [id number]

## 2017-10-30 NOTE — PROGRESS NOTES
Spoke with Juan HOFFMAN ([phone]) about transfusion order. He does not want blood transfused today. Orders put in as needed for surgery tomorrow.

## 2017-10-30 NOTE — PLAN OF CARE
Problem: Nutrition  Goal: Optimal nutrition therapy  Outcome: Ongoing  Nutrition Problem: Inadequate oral intake  Intervention: Food and/or Nutrient Delivery: Continue NPO, Start oral diet, Start ONS  Nutritional Goals: Pt will tolerate diet advancement. Intakes 50% or more of meals. No wt loss.

## 2017-10-30 NOTE — CARE COORDINATION
Found that patient has 2 sons. The eldest son is Deepak Murrieta ph # [phone]. Nirmal Seed is Farhat Arzate ph # [phone]. Spoke with Sawyer Santiago this am,  Informed him that we needed him to come to hospital to help with consent for surgical procedure. He stated he will be here in a little while.

## 2017-10-31 ENCOUNTER — ANESTHESIA (OUTPATIENT)
Dept: OPERATING ROOM | Age: 67
DRG: 956 | End: 2017-10-31
Payer: MEDICARE

## 2017-10-31 ENCOUNTER — APPOINTMENT (OUTPATIENT)
Dept: GENERAL RADIOLOGY | Age: 67
DRG: 956 | End: 2017-10-31
Payer: MEDICARE

## 2017-10-31 ENCOUNTER — ANESTHESIA EVENT (OUTPATIENT)
Dept: OPERATING ROOM | Age: 67
DRG: 956 | End: 2017-10-31
Payer: MEDICARE

## 2017-10-31 VITALS
TEMPERATURE: 98 F | SYSTOLIC BLOOD PRESSURE: 120 MMHG | RESPIRATION RATE: 7 BRPM | DIASTOLIC BLOOD PRESSURE: 63 MMHG | OXYGEN SATURATION: 100 %

## 2017-10-31 LAB
ANION GAP SERPL CALCULATED.3IONS-SCNC: 10 MMOL/L (ref 7–19)
BASOPHILS ABSOLUTE: 0 K/UL (ref 0–0.2)
BASOPHILS RELATIVE PERCENT: 0.1 % (ref 0–1)
BLOOD CULTURE, ROUTINE: ABNORMAL
BLOOD CULTURE, ROUTINE: ABNORMAL
BUN BLDV-MCNC: 57 MG/DL (ref 8–23)
CALCIUM SERPL-MCNC: 8.1 MG/DL (ref 8.8–10.2)
CHLORIDE BLD-SCNC: 114 MMOL/L (ref 98–111)
CO2: 27 MMOL/L (ref 22–29)
CREAT SERPL-MCNC: 1.5 MG/DL (ref 0.5–0.9)
EOSINOPHILS ABSOLUTE: 0 K/UL (ref 0–0.6)
EOSINOPHILS RELATIVE PERCENT: 0 % (ref 0–5)
GFR NON-AFRICAN AMERICAN: 34
GLUCOSE BLD-MCNC: 209 MG/DL (ref 74–109)
HCT VFR BLD CALC: 26.7 % (ref 37–47)
HEMOGLOBIN: 8.2 G/DL (ref 12–16)
LYMPHOCYTES ABSOLUTE: 0.4 K/UL (ref 1.1–4.5)
LYMPHOCYTES RELATIVE PERCENT: 3.1 % (ref 20–40)
MCH RBC QN AUTO: 30.5 PG (ref 27–31)
MCHC RBC AUTO-ENTMCNC: 30.7 G/DL (ref 33–37)
MCV RBC AUTO: 99.3 FL (ref 81–99)
MONOCYTES ABSOLUTE: 1 K/UL (ref 0–0.9)
MONOCYTES RELATIVE PERCENT: 8 % (ref 0–10)
MYOGLOBIN URINE: <1 MG/L (ref 0–1)
NEUTROPHILS ABSOLUTE: 10.5 K/UL (ref 1.5–7.5)
NEUTROPHILS RELATIVE PERCENT: 88.2 % (ref 50–65)
ORGANISM: ABNORMAL
PDW BLD-RTO: 14.9 % (ref 11.5–14.5)
PLATELET # BLD: 262 K/UL (ref 130–400)
PMV BLD AUTO: 10 FL (ref 9.4–12.3)
POTASSIUM SERPL-SCNC: 3.9 MMOL/L (ref 3.5–5)
RBC # BLD: 2.69 M/UL (ref 4.2–5.4)
SODIUM BLD-SCNC: 151 MMOL/L (ref 136–145)
URINE CULTURE, ROUTINE: NORMAL
WBC # BLD: 11.9 K/UL (ref 4.8–10.8)

## 2017-10-31 PROCEDURE — P9045 ALBUMIN (HUMAN), 5%, 250 ML: HCPCS | Performed by: NURSE ANESTHETIST, CERTIFIED REGISTERED

## 2017-10-31 PROCEDURE — C1713 ANCHOR/SCREW BN/BN,TIS/BN: HCPCS | Performed by: ORTHOPAEDIC SURGERY

## 2017-10-31 PROCEDURE — 3700000000 HC ANESTHESIA ATTENDED CARE: Performed by: ORTHOPAEDIC SURGERY

## 2017-10-31 PROCEDURE — 2580000003 HC RX 258: Performed by: INTERNAL MEDICINE

## 2017-10-31 PROCEDURE — 6360000002 HC RX W HCPCS: Performed by: ORTHOPAEDIC SURGERY

## 2017-10-31 PROCEDURE — 2700000000 HC OXYGEN THERAPY PER DAY

## 2017-10-31 PROCEDURE — 94664 DEMO&/EVAL PT USE INHALER: CPT

## 2017-10-31 PROCEDURE — 7100000001 HC PACU RECOVERY - ADDTL 15 MIN: Performed by: ORTHOPAEDIC SURGERY

## 2017-10-31 PROCEDURE — 3600000004 HC SURGERY LEVEL 4 BASE: Performed by: ORTHOPAEDIC SURGERY

## 2017-10-31 PROCEDURE — 36415 COLL VENOUS BLD VENIPUNCTURE: CPT

## 2017-10-31 PROCEDURE — 7100000000 HC PACU RECOVERY - FIRST 15 MIN: Performed by: ORTHOPAEDIC SURGERY

## 2017-10-31 PROCEDURE — 6360000002 HC RX W HCPCS: Performed by: INTERNAL MEDICINE

## 2017-10-31 PROCEDURE — 6370000000 HC RX 637 (ALT 250 FOR IP): Performed by: ORTHOPAEDIC SURGERY

## 2017-10-31 PROCEDURE — 6370000000 HC RX 637 (ALT 250 FOR IP): Performed by: ANESTHESIOLOGY

## 2017-10-31 PROCEDURE — 6360000002 HC RX W HCPCS: Performed by: NURSE ANESTHETIST, CERTIFIED REGISTERED

## 2017-10-31 PROCEDURE — 1210000000 HC MED SURG R&B

## 2017-10-31 PROCEDURE — 2580000003 HC RX 258: Performed by: HOSPITALIST

## 2017-10-31 PROCEDURE — 85025 COMPLETE CBC W/AUTO DIFF WBC: CPT

## 2017-10-31 PROCEDURE — 3600000014 HC SURGERY LEVEL 4 ADDTL 15MIN: Performed by: ORTHOPAEDIC SURGERY

## 2017-10-31 PROCEDURE — 2580000003 HC RX 258: Performed by: NURSE ANESTHETIST, CERTIFIED REGISTERED

## 2017-10-31 PROCEDURE — 2720000001 HC MISC SURG SUPPLY STERILE $51-500: Performed by: ORTHOPAEDIC SURGERY

## 2017-10-31 PROCEDURE — 6360000002 HC RX W HCPCS: Performed by: HOSPITALIST

## 2017-10-31 PROCEDURE — 0QS604Z REPOSITION RIGHT UPPER FEMUR WITH INTERNAL FIXATION DEVICE, OPEN APPROACH: ICD-10-PCS | Performed by: ORTHOPAEDIC SURGERY

## 2017-10-31 PROCEDURE — 2500000003 HC RX 250 WO HCPCS: Performed by: NURSE ANESTHETIST, CERTIFIED REGISTERED

## 2017-10-31 PROCEDURE — 2580000003 HC RX 258: Performed by: ORTHOPAEDIC SURGERY

## 2017-10-31 PROCEDURE — 71010 XR CHEST PORTABLE: CPT

## 2017-10-31 PROCEDURE — 80048 BASIC METABOLIC PNL TOTAL CA: CPT

## 2017-10-31 PROCEDURE — 2580000003 HC RX 258: Performed by: ANESTHESIOLOGY

## 2017-10-31 PROCEDURE — 6370000000 HC RX 637 (ALT 250 FOR IP)

## 2017-10-31 PROCEDURE — 3209999900 FLUORO FOR SURGICAL PROCEDURES

## 2017-10-31 PROCEDURE — 3700000001 HC ADD 15 MINUTES (ANESTHESIA): Performed by: ORTHOPAEDIC SURGERY

## 2017-10-31 PROCEDURE — 94640 AIRWAY INHALATION TREATMENT: CPT

## 2017-10-31 PROCEDURE — 6370000000 HC RX 637 (ALT 250 FOR IP): Performed by: INTERNAL MEDICINE

## 2017-10-31 DEVICE — ZNN CMN LAG SCREW 10.5X85
Type: IMPLANTABLE DEVICE | Site: HIP | Status: FUNCTIONAL
Brand: ZIMMER® NATURAL NAIL® SYSTEM

## 2017-10-31 DEVICE — SCREW BNE L30MM DIA5MM HEX HD DIA35MM CORT TIB TI ALLY FIX: Type: IMPLANTABLE DEVICE | Site: HIP | Status: FUNCTIONAL

## 2017-10-31 RX ORDER — SODIUM CHLORIDE 0.9 % (FLUSH) 0.9 %
10 SYRINGE (ML) INJECTION EVERY 12 HOURS SCHEDULED
Status: DISCONTINUED | OUTPATIENT
Start: 2017-10-31 | End: 2017-11-04 | Stop reason: HOSPADM

## 2017-10-31 RX ORDER — HYDROMORPHONE HYDROCHLORIDE 2 MG/1
1 TABLET ORAL EVERY 4 HOURS PRN
Status: DISCONTINUED | OUTPATIENT
Start: 2017-10-31 | End: 2017-11-02

## 2017-10-31 RX ORDER — DOCUSATE SODIUM 100 MG/1
100 CAPSULE, LIQUID FILLED ORAL 2 TIMES DAILY
Status: DISCONTINUED | OUTPATIENT
Start: 2017-10-31 | End: 2017-11-04 | Stop reason: HOSPADM

## 2017-10-31 RX ORDER — LIDOCAINE HYDROCHLORIDE 10 MG/ML
1 INJECTION, SOLUTION EPIDURAL; INFILTRATION; INTRACAUDAL; PERINEURAL
Status: DISCONTINUED | OUTPATIENT
Start: 2017-10-31 | End: 2017-10-31 | Stop reason: HOSPADM

## 2017-10-31 RX ORDER — ROCURONIUM BROMIDE 10 MG/ML
INJECTION, SOLUTION INTRAVENOUS PRN
Status: DISCONTINUED | OUTPATIENT
Start: 2017-10-31 | End: 2017-10-31 | Stop reason: SDUPTHER

## 2017-10-31 RX ORDER — FENTANYL CITRATE 50 UG/ML
INJECTION, SOLUTION INTRAMUSCULAR; INTRAVENOUS PRN
Status: DISCONTINUED | OUTPATIENT
Start: 2017-10-31 | End: 2017-10-31 | Stop reason: SDUPTHER

## 2017-10-31 RX ORDER — SODIUM CHLORIDE 0.9 % (FLUSH) 0.9 %
10 SYRINGE (ML) INJECTION PRN
Status: DISCONTINUED | OUTPATIENT
Start: 2017-10-31 | End: 2017-11-04 | Stop reason: HOSPADM

## 2017-10-31 RX ORDER — SODIUM CHLORIDE 9 MG/ML
INJECTION, SOLUTION INTRAVENOUS CONTINUOUS
Status: DISCONTINUED | OUTPATIENT
Start: 2017-10-31 | End: 2017-11-01

## 2017-10-31 RX ORDER — METOCLOPRAMIDE HYDROCHLORIDE 5 MG/ML
10 INJECTION INTRAMUSCULAR; INTRAVENOUS
Status: DISCONTINUED | OUTPATIENT
Start: 2017-10-31 | End: 2017-10-31 | Stop reason: HOSPADM

## 2017-10-31 RX ORDER — LIDOCAINE HYDROCHLORIDE 10 MG/ML
INJECTION, SOLUTION INFILTRATION; PERINEURAL PRN
Status: DISCONTINUED | OUTPATIENT
Start: 2017-10-31 | End: 2017-10-31 | Stop reason: SDUPTHER

## 2017-10-31 RX ORDER — IPRATROPIUM BROMIDE AND ALBUTEROL SULFATE 2.5; .5 MG/3ML; MG/3ML
1 SOLUTION RESPIRATORY (INHALATION) ONCE
Status: COMPLETED | OUTPATIENT
Start: 2017-10-31 | End: 2017-10-31

## 2017-10-31 RX ORDER — MORPHINE SULFATE 4 MG/ML
4 INJECTION, SOLUTION INTRAMUSCULAR; INTRAVENOUS
Status: CANCELLED | OUTPATIENT
Start: 2017-10-31

## 2017-10-31 RX ORDER — SODIUM CHLORIDE 0.9 % (FLUSH) 0.9 %
10 SYRINGE (ML) INJECTION EVERY 12 HOURS SCHEDULED
Status: DISCONTINUED | OUTPATIENT
Start: 2017-10-31 | End: 2017-10-31 | Stop reason: HOSPADM

## 2017-10-31 RX ORDER — ASPIRIN 81 MG/1
81 TABLET ORAL 2 TIMES DAILY
Status: DISCONTINUED | OUTPATIENT
Start: 2017-10-31 | End: 2017-11-04 | Stop reason: HOSPADM

## 2017-10-31 RX ORDER — FENTANYL CITRATE 50 UG/ML
50 INJECTION, SOLUTION INTRAMUSCULAR; INTRAVENOUS
Status: DISCONTINUED | OUTPATIENT
Start: 2017-10-31 | End: 2017-10-31 | Stop reason: HOSPADM

## 2017-10-31 RX ORDER — 0.9 % SODIUM CHLORIDE 0.9 %
500 INTRAVENOUS SOLUTION INTRAVENOUS PRN
Status: DISCONTINUED | OUTPATIENT
Start: 2017-10-31 | End: 2017-11-01

## 2017-10-31 RX ORDER — SODIUM CHLORIDE, SODIUM LACTATE, POTASSIUM CHLORIDE, CALCIUM CHLORIDE 600; 310; 30; 20 MG/100ML; MG/100ML; MG/100ML; MG/100ML
INJECTION, SOLUTION INTRAVENOUS CONTINUOUS
Status: DISCONTINUED | OUTPATIENT
Start: 2017-10-31 | End: 2017-10-31

## 2017-10-31 RX ORDER — PROPOFOL 10 MG/ML
INJECTION, EMULSION INTRAVENOUS PRN
Status: DISCONTINUED | OUTPATIENT
Start: 2017-10-31 | End: 2017-10-31 | Stop reason: SDUPTHER

## 2017-10-31 RX ORDER — SCOLOPAMINE TRANSDERMAL SYSTEM 1 MG/1
1 PATCH, EXTENDED RELEASE TRANSDERMAL
Status: DISCONTINUED | OUTPATIENT
Start: 2017-10-31 | End: 2017-10-31 | Stop reason: HOSPADM

## 2017-10-31 RX ORDER — LINEZOLID 2 MG/ML
600 INJECTION, SOLUTION INTRAVENOUS EVERY 12 HOURS
Status: DISCONTINUED | OUTPATIENT
Start: 2017-10-31 | End: 2017-11-03

## 2017-10-31 RX ORDER — MIDAZOLAM HYDROCHLORIDE 1 MG/ML
2 INJECTION INTRAMUSCULAR; INTRAVENOUS
Status: DISCONTINUED | OUTPATIENT
Start: 2017-10-31 | End: 2017-10-31 | Stop reason: HOSPADM

## 2017-10-31 RX ORDER — HYDROMORPHONE HYDROCHLORIDE 2 MG/1
2 TABLET ORAL EVERY 4 HOURS PRN
Status: DISCONTINUED | OUTPATIENT
Start: 2017-10-31 | End: 2017-11-02

## 2017-10-31 RX ORDER — MORPHINE SULFATE 10 MG/ML
2 INJECTION, SOLUTION INTRAMUSCULAR; INTRAVENOUS EVERY 5 MIN PRN
Status: DISCONTINUED | OUTPATIENT
Start: 2017-10-31 | End: 2017-10-31 | Stop reason: HOSPADM

## 2017-10-31 RX ORDER — MORPHINE SULFATE 4 MG/ML
4 INJECTION, SOLUTION INTRAMUSCULAR; INTRAVENOUS
Status: DISCONTINUED | OUTPATIENT
Start: 2017-10-31 | End: 2017-10-31 | Stop reason: HOSPADM

## 2017-10-31 RX ORDER — DIPHENHYDRAMINE HYDROCHLORIDE 50 MG/ML
12.5 INJECTION INTRAMUSCULAR; INTRAVENOUS
Status: DISCONTINUED | OUTPATIENT
Start: 2017-10-31 | End: 2017-10-31 | Stop reason: HOSPADM

## 2017-10-31 RX ORDER — HYDRALAZINE HYDROCHLORIDE 20 MG/ML
5 INJECTION INTRAMUSCULAR; INTRAVENOUS EVERY 10 MIN PRN
Status: DISCONTINUED | OUTPATIENT
Start: 2017-10-31 | End: 2017-10-31 | Stop reason: HOSPADM

## 2017-10-31 RX ORDER — DEXAMETHASONE SODIUM PHOSPHATE 10 MG/ML
INJECTION INTRAMUSCULAR; INTRAVENOUS PRN
Status: DISCONTINUED | OUTPATIENT
Start: 2017-10-31 | End: 2017-10-31 | Stop reason: SDUPTHER

## 2017-10-31 RX ORDER — MORPHINE SULFATE 4 MG/ML
2 INJECTION, SOLUTION INTRAMUSCULAR; INTRAVENOUS
Status: CANCELLED | OUTPATIENT
Start: 2017-10-31

## 2017-10-31 RX ORDER — LABETALOL HYDROCHLORIDE 5 MG/ML
5 INJECTION, SOLUTION INTRAVENOUS EVERY 10 MIN PRN
Status: DISCONTINUED | OUTPATIENT
Start: 2017-10-31 | End: 2017-10-31 | Stop reason: HOSPADM

## 2017-10-31 RX ORDER — VANCOMYCIN HYDROCHLORIDE 1 G/200ML
1000 INJECTION, SOLUTION INTRAVENOUS EVERY 12 HOURS
Status: DISCONTINUED | OUTPATIENT
Start: 2017-10-31 | End: 2017-10-31

## 2017-10-31 RX ORDER — IPRATROPIUM BROMIDE AND ALBUTEROL SULFATE 2.5; .5 MG/3ML; MG/3ML
SOLUTION RESPIRATORY (INHALATION)
Status: COMPLETED
Start: 2017-10-31 | End: 2017-10-31

## 2017-10-31 RX ORDER — MORPHINE SULFATE 10 MG/ML
4 INJECTION, SOLUTION INTRAMUSCULAR; INTRAVENOUS EVERY 5 MIN PRN
Status: DISCONTINUED | OUTPATIENT
Start: 2017-10-31 | End: 2017-10-31 | Stop reason: HOSPADM

## 2017-10-31 RX ORDER — SODIUM CHLORIDE 0.9 % (FLUSH) 0.9 %
10 SYRINGE (ML) INJECTION PRN
Status: DISCONTINUED | OUTPATIENT
Start: 2017-10-31 | End: 2017-10-31 | Stop reason: HOSPADM

## 2017-10-31 RX ORDER — PROMETHAZINE HYDROCHLORIDE 25 MG/ML
6.25 INJECTION, SOLUTION INTRAMUSCULAR; INTRAVENOUS
Status: DISCONTINUED | OUTPATIENT
Start: 2017-10-31 | End: 2017-10-31 | Stop reason: HOSPADM

## 2017-10-31 RX ORDER — ALBUMIN, HUMAN INJ 5% 5 %
SOLUTION INTRAVENOUS PRN
Status: DISCONTINUED | OUTPATIENT
Start: 2017-10-31 | End: 2017-10-31 | Stop reason: SDUPTHER

## 2017-10-31 RX ADMIN — FENTANYL CITRATE 50 MCG: 50 INJECTION, SOLUTION INTRAMUSCULAR; INTRAVENOUS at 13:19

## 2017-10-31 RX ADMIN — MOMETASONE FUROATE AND FORMOTEROL FUMARATE DIHYDRATE 2 PUFF: 200; 5 AEROSOL RESPIRATORY (INHALATION) at 06:57

## 2017-10-31 RX ADMIN — PHENYLEPHRINE HYDROCHLORIDE 20 MCG/MIN: 10 INJECTION INTRAVENOUS at 13:44

## 2017-10-31 RX ADMIN — PHENYLEPHRINE HYDROCHLORIDE 100 MCG: 10 INJECTION INTRAVENOUS at 13:19

## 2017-10-31 RX ADMIN — SUGAMMADEX 100 MG: 100 INJECTION, SOLUTION INTRAVENOUS at 14:48

## 2017-10-31 RX ADMIN — SODIUM CHLORIDE, POTASSIUM CHLORIDE, SODIUM LACTATE AND CALCIUM CHLORIDE: 600; 310; 30; 20 INJECTION, SOLUTION INTRAVENOUS at 12:52

## 2017-10-31 RX ADMIN — PHENYLEPHRINE HYDROCHLORIDE 150 MCG: 10 INJECTION INTRAVENOUS at 13:39

## 2017-10-31 RX ADMIN — PHENYLEPHRINE HYDROCHLORIDE 100 MCG: 10 INJECTION INTRAVENOUS at 13:35

## 2017-10-31 RX ADMIN — IPRATROPIUM BROMIDE AND ALBUTEROL SULFATE 3 ML: .5; 3 SOLUTION RESPIRATORY (INHALATION) at 22:43

## 2017-10-31 RX ADMIN — MORPHINE SULFATE 2 MG: 10 INJECTION, SOLUTION INTRAMUSCULAR; INTRAVENOUS at 10:34

## 2017-10-31 RX ADMIN — CEFAZOLIN SODIUM 1 G: 1 INJECTION, SOLUTION INTRAVENOUS at 13:24

## 2017-10-31 RX ADMIN — IPRATROPIUM BROMIDE AND ALBUTEROL SULFATE 1 AMPULE: .5; 3 SOLUTION RESPIRATORY (INHALATION) at 18:45

## 2017-10-31 RX ADMIN — LINEZOLID 600 MG: 600 INJECTION, SOLUTION INTRAVENOUS at 12:16

## 2017-10-31 RX ADMIN — PROPOFOL 70 MG: 10 INJECTION, EMULSION INTRAVENOUS at 13:19

## 2017-10-31 RX ADMIN — ALBUMIN (HUMAN) 12.5 G: 12.5 INJECTION, SOLUTION INTRAVENOUS at 13:39

## 2017-10-31 RX ADMIN — DEXTROSE MONOHYDRATE: 50 INJECTION, SOLUTION INTRAVENOUS at 11:01

## 2017-10-31 RX ADMIN — Medication 10 ML: at 10:36

## 2017-10-31 RX ADMIN — DEXAMETHASONE SODIUM PHOSPHATE 10 MG: 10 INJECTION INTRAMUSCULAR; INTRAVENOUS at 13:27

## 2017-10-31 RX ADMIN — DEXTROSE MONOHYDRATE: 50 INJECTION, SOLUTION INTRAVENOUS at 02:30

## 2017-10-31 RX ADMIN — IPRATROPIUM BROMIDE AND ALBUTEROL SULFATE 1 AMPULE: .5; 3 SOLUTION RESPIRATORY (INHALATION) at 07:14

## 2017-10-31 RX ADMIN — ROCURONIUM BROMIDE 30 MG: 10 INJECTION INTRAVENOUS at 13:19

## 2017-10-31 RX ADMIN — PHENYLEPHRINE HYDROCHLORIDE 200 MCG: 10 INJECTION INTRAVENOUS at 13:41

## 2017-10-31 RX ADMIN — LIDOCAINE HYDROCHLORIDE 50 MG: 10 INJECTION, SOLUTION INFILTRATION; PERINEURAL at 13:19

## 2017-10-31 RX ADMIN — LINEZOLID 600 MG: 600 INJECTION, SOLUTION INTRAVENOUS at 04:21

## 2017-10-31 RX ADMIN — SODIUM CHLORIDE: 9 INJECTION, SOLUTION INTRAVENOUS at 18:55

## 2017-10-31 RX ADMIN — IPRATROPIUM BROMIDE AND ALBUTEROL SULFATE 1 AMPULE: .5; 3 SOLUTION RESPIRATORY (INHALATION) at 10:49

## 2017-10-31 RX ADMIN — PHENYLEPHRINE HYDROCHLORIDE 50 MCG: 10 INJECTION INTRAVENOUS at 13:27

## 2017-10-31 RX ADMIN — IPRATROPIUM BROMIDE AND ALBUTEROL SULFATE 1 AMPULE: .5; 3 SOLUTION RESPIRATORY (INHALATION) at 16:40

## 2017-10-31 ASSESSMENT — PAIN SCALES - GENERAL
PAINLEVEL_OUTOF10: 0
PAINLEVEL_OUTOF10: 10
PAINLEVEL_OUTOF10: 0

## 2017-10-31 ASSESSMENT — ENCOUNTER SYMPTOMS
EYES NEGATIVE: 1
COUGH: 1
GASTROINTESTINAL NEGATIVE: 1

## 2017-10-31 NOTE — CARE COORDINATION
MIK placed call to Pt's son, Brent Arellano) at [phone] to see if he is agreeable to have Pt go to short-term rehab after her hip sx (pt may also go to another floor first) and which SNF they prefer as she lives in [facility] . Left  for a return call.     Electronically signed by FREDERICK Morse on 10/31/2017 at 12:21 PM

## 2017-10-31 NOTE — PROGRESS NOTES
Patient presented to [facility] with two wounds on sacral area.     Electronically signed by Adalberto Brewster RN on 10/31/2017 at 3:31 PM

## 2017-10-31 NOTE — OP NOTE
INTERTROCHANTERIC FEMORAL FRACTURE CM NAIL   OPERATIVE NOTE    NAME OF SURGEON / : Jenna Morrison MD  PATIENT:   Marvin Mcgarry  Date: 10/31/2017        Time: 2:28 PM   Referring Physician: ________________________    PREOP DIAGNOSIS:  right displaced intertrochanteric femur fracture  POSTOP DIAGNOSIS:  Same     PROCEDURE:    Right    Hip fracture open reduction and internal fixation with lizz cephalomedullary nail. IMPLANTS:   Implant Name Type Inv. Item Serial No.  Lot No. LRB No. Used   SCREW CORTICAL 5X30MM Screw/Plate/Nail/Rodney SCREW CORTICAL 5X30MM  [employer] [id number] Right 1   SCREW LAG 10.5X85MM Screw/Plate/Nail/Rodney SCREW LAG 10.5X85MM   [employer] [id number] Right 1       FINDINGS: None  ASSISTANT: alyssa vásquez    ANESTHESIA:  General  EBL:  300 mL  FLUIDS: See anesthesia record  BLOOD PRODUCTS:  None  COMPLICATIONS:  None  SPECIMEN:  None        INDICATIONS:  Patient presents for the above procedure having failed conservative treatment. Patient consents to the procedure above understanding the risks of bleeding, infection, anesthesia, nerve injury, stiffness, and blood clots. Procedure in Detail:    The patient was brought into the operating room, general anesthesia given, and transferred to the HANA table. The operative extremity was placed in light traction across a padded perineal post.  Reduction was confirmed on AP and lateral c-arm views. An antibiotic was given IV. The extremity was prepped with chlorhexidine and alcohol and draped sterilely. A shower curtain drape used. A one inch incision was made over the lateral hip at the level of the ASIS and deepened through fascia. A 3/16 inch threaded alexandro pin was placed at the tip of the greater trochanter and advanced down the center of the femoral canal on both AP and Lateral c-arm views. The starter drill was advanced over the pin and the drill/pin were removed.   The nail was advanced down the canal.  The proximal cannula was placed through the outrigger and pressed against the skin. The skin was incised over the skin impression and the scalpel advanced to bone. The cannula was advanced up against the lateral femur. A alexandro pin was advanced through the cannula to within 5 mm of subchondral bone of the femoral head. The pin was centered in the head on AP and lateral views. The lag screw length was measured off the pin. The screw path was reamed and the screw placed. The pin was removed. A locking screw was advanced through the proximal end of the nail, tightened and then backed off a quarter turn. A similar technique was used to place a distal static screw. C-arm was used to verify the fracture was well reduced and the hardware was in good position. The wound was checked for bleeding and pulse lavaged with antibiotic irrigation. The fascia was closed with 0 vicryl running suture. The subcutaneous layer was closed  with 2-0 vicryl and the skin closed with staples. A sterile dressing was placed. The patient was awakened, extubated and transferred to recovery in stable condition.       Electronically signed by Puneet Davis MD on 10/31/2017 at 2:28 PM

## 2017-10-31 NOTE — ANESTHESIA POSTPROCEDURE EVALUATION
Department of Anesthesiology  Postprocedure Note    Patient: Shalonda Wade  MRN: [id number]  YOB: 1950  Date of evaluation: 10/31/2017  Time:  3:23 PM     Procedure Summary     Date:  10/31/17 Room / Location:  [facility] OR  / [facility] OR    Anesthesia Start:  6084 Anesthesia Stop:  9409    Procedure:  TFN SHORT NAIL (Right Hip) Diagnosis:  (fractured Right hip)    Surgeon:  Quintin Ramirez MD Responsible Provider:  Ashley Awad CRNA    Anesthesia Type:  general ASA Status:  4          Anesthesia Type: general    Demetrius Phase I: Demetrius Score: 4    Demetrius Phase II:      Last vitals: Reviewed and per EMR flowsheets. Anesthesia Post Evaluation    Patient location during evaluation: PACU  Patient participation: complete - patient participated  Level of consciousness: sleepy but conscious  Pain score: 0  Airway patency: patent  Nausea & Vomiting: no nausea and no vomiting  Complications: no  Cardiovascular status: hemodynamically stable  Respiratory status: acceptable  Hydration status: euvolemic  Comments: Prolonged emergence from anesthesia.

## 2017-10-31 NOTE — PROGRESS NOTES
Patient has been calm this shift. Patient respiration even non labored. No s/s of distress or discomfort voiced or noted. Sparrow cath at bedside draining to gravity.

## 2017-10-31 NOTE — ANESTHESIA PRE PROCEDURE
Hold] ipratropium-albuterol (DUONEB) nebulizer solution 1 ampule  1 ampule Inhalation Q4H WA Eduardo Lee MD   1 ampule at 10/31/17 1049       Allergies: Allergies   Allergen Reactions    Codeine Itching    [de-identified] A500 [Propoxyphene N-Acetaminophen]      Insomnia    Morphine Nausea And Vomiting       Problem List:    Patient Active Problem List   Diagnosis Code    Chronic back pain M54.9, G89.29    Osteoarthritis M19.90    Depression F32.9    Hyperlipidemia E78.5    Anxiety F41.9    Osteoporosis M81.0    Cerebral artery occlusion I66.9    Hypertension I10    Hyponatremia E87.1    Asthma with COPD (chronic obstructive pulmonary disease) ([facility] J44.9    Cachexia ([facility] R64    Essential hypertension I10    Heme positive stool R19.5    Tortuous colon Q43.8    Diverticulosis of large intestine without hemorrhage K57.30    Seasonal allergic rhinitis J30.2    Hypertension I10    COPD [facility]ch[facility] obstructive [facility] disease) ([facility] J44.9    Closed right hip fracture, initial encounter ([facility] S72.001A    Rhabdomyolysis M62.82    Fall W19. Eligha Dom    Acute cystitis without hematuria N30.00    Hypernatremia E87.0    Gram-positive bacteremia R78.81    FRANCIA (acute kidney injury) ([facility] U79.0    Metabolic encephalopathy D92.33       Past Medical History:        Diagnosis Date    [facility]) ([facility] 10/30/2017    Allergic rhinitis     Asthma 12/9/14    Dr. Vinicius Cooper Cerebral artery occlusion     Cerebral artery occlusion with cerebral infarction [facility])     Chronic back pain     COPD (chronic obstructive pulmonary disease) ([facility]     Depression     Hyperlipidemia     Hypertension     Hyponatremia     Osteoarthritis     Osteoporosis     Pneumonia        Past Surgical History:        Procedure Laterality Date    ANKLE FRACTURE SURGERY Right     COLONOSCOPY  10/07/2016    Dr. Kristina Peacock Left     wrist    EYE SURGERY Left 01/05/2016    film removal on cataract  HYSTERECTOMY      NE COLONOSCOPY FLX DX W/COLLJ SPEC WHEN PFRMD N/A 10/7/2016    Dr Peyman Viera-incomplete due to extensive tortuosity and diverticulosis-BE normal, recall to be determined in office    TUBAL LIGATION         Social History:    Social History   Substance Use Topics    Smoking status: Current Some Day Smoker     Packs/day: 0.50     Years: 15.00     Types: Cigarettes    Smokeless tobacco: Never Used    Alcohol use No      Comment: occ                                Ready to quit: No  Counseling given: No      Vital Signs (Current):   Vitals:    10/31/17 0031 10/31/17 0406 10/31/17 0740 10/31/17 1103   BP:  116/68 121/68 117/62   Pulse:  107 107 119   Resp:  18 18 14   Temp:  97.7 °F (36.5 °C) 97.6 °F (36.4 °C) 98.6 °F (37 °C)   TempSrc:  Temporal Temporal Temporal   SpO2:  95% 98% 99%   Weight: 77 lb 6.4 oz (35.1 kg)      Height:                                                  BP Readings from Last 3 Encounters:   10/31/17 117/62   10/16/17 118/72   09/15/17 118/76       NPO Status: Time of last liquid consumption: 0000                        Time of last solid consumption: 0000                        Date of last liquid consumption: 10/30/17                        Date of last solid food consumption: 10/30/17    BMI:   Wt Readings from Last 3 Encounters:   10/31/17 77 lb 6.4 oz (35.1 kg)   10/16/17 78 lb 3.2 oz (35.5 kg)   09/15/17 75 lb 12.8 oz (34.4 kg)     Body mass index is 21.77 kg/m².     CBC:   Lab Results   Component Value Date    WBC 11.9 10/31/2017    RBC 2.69 10/31/2017    HGB 8.2 10/31/2017    HCT 26.7 10/31/2017    MCV 99.3 10/31/2017    RDW 14.9 10/31/2017     10/31/2017       CMP:   Lab Results   Component Value Date     10/31/2017    K 3.9 10/31/2017     10/31/2017    CO2 27 10/31/2017    BUN 57 10/31/2017    CREATININE 1.5 10/31/2017    LABGLOM 34 10/31/2017    GLUCOSE 209 10/31/2017    PROT 7.5 10/29/2017    CALCIUM 8.1 10/31/2017    BILITOT 0.5 10/29/2017 ALKPHOS 67 10/29/2017     10/29/2017    ALT 51 10/29/2017       POC Tests:   Recent Labs      10/30/17   0110   POCGLU  76*       Coags:   Lab Results   Component Value Date    PROTIME 13.6 10/30/2017    INR 1.05 10/30/2017       HCG (If Applicable): No results found for: PREGTESTUR, PREGSERUM, HCG, HCGQUANT     ABGs:   Lab Results   Component Value Date    PHART 7.230 10/29/2017    PO2ART 98.0 10/29/2017    GKH1BEJ 42.0 10/29/2017    QAL4CGW 17.6 10/29/2017    BEART -9.4 10/29/2017    N9ZAKTSB 95.6 10/29/2017        Type & Screen (If Applicable):  No results found for: LABABO, LABRH    Anesthesia Evaluation    Airway: Mallampati: II  TM distance: >3 FB   Neck ROM: full  Mouth opening: > = 3 FB and < 3 FB Dental:    (+) other      Pulmonary:normal exam  breath sounds clear to auscultation  (+) pneumonia: resolved,  COPD:  decreased breath sounds,  asthma:                            Cardiovascular:    (+) hypertension:,     (-) murmur      Rhythm: regular  Rate: normal                    Neuro/Psych:   (+) CVA:, neuromuscular disease:, psychiatric history:            GI/Hepatic/Renal:   (+) renal disease: ARF and CRI,           Endo/Other:    (+) blood dyscrasia: anemia, arthritis:. Abdominal:   (+) scaphoid    Abdomen: soft. Vascular:                                      Anesthesia Plan      general     ASA 4     (Dehydration   High risk for postop delirium, chf, renal failure mi, cva     NO sux, recent rhabdomyolysis , )        Anesthetic plan and risks discussed with patient.                       Tin Mcwilliams MD   10/31/2017

## 2017-10-31 NOTE — PROGRESS NOTES
Patient uncomfortable and reports pain. Pulled morphine from omnicell to give 2 mg. Noted allergy listed (severity low, nausea and vomiting). Spoke with Dr. Hever Juarez. He said to go ahead and give 2 mg morphine, followed by bendryl with complaints of itching.  Electronically signed by Amna Graham RN on 10/30/2017 at 8:24 PM

## 2017-10-31 NOTE — PROGRESS NOTES
Received SCD machine for patient. Was unable to hook up to patient because now heading down to surgery. Will hook up to patient when returns or pass on to nurse in another unit if transferred.  Electronically signed by Antonieta Conroy RN on 10/31/2017 at 12:21 PM

## 2017-10-31 NOTE — PROGRESS NOTES
Came to patients room and found that she had a bottle of water. The sons stated the patient had 3 sips of water; approximately 60 ml. Her sons were at the bedside. I explained to the patient and her sons that the patient is not to have anything by mouth because she has a procedure scheduled for this evening. Will continue to monitor patient. Informed Pardeep Ruelas RN of the patient drinking water. She stated to continue to monitor patient and that she will still be fine to have the procedue since it is in the evening.  Electronically signed by Giuliana Hurtado RN on 10/31/2017 at 10:32 AM

## 2017-10-31 NOTE — PROGRESS NOTES
[facility]      Patient:  Merlinda Hu  YOB: 1950  Date of Service: 10/31/2017  MRN: [id number]   Acct: [de-identified]   Primary Care Physician: Mag Renee MD  Advance Directive: Full Code  Admit Date: 10/29/2017       Hospital Day: 2     Chief Complaint: right hip pain, anxiety    SUBJECTIVE:  Patient awake in bed with sons at bedside. Appears anxious when mentioning her surgery today. Patient looks unkept but other than hip pain states no other complaints. Cumulative hospital history:   The patient is a 69 y.o. female who was brought in by EMS after being found down by friend. History supplemented from chart review as pt unable to give reliable history.   She was unable to describe how she ended up on floor or how long she had been there. Pt did mention some RLE pain when she was moving in bed. Denied any CP, SOB, abd pain, urinary symptoms. Review of Systems:   Review of Systems   Constitutional: Negative. HENT: Negative. Eyes: Negative. Respiratory: Positive for cough. Cardiovascular: Negative. Gastrointestinal: Negative. Genitourinary: Negative. Musculoskeletal: Positive for joint pain. Skin: Negative for itching and rash. Neurological: Negative. Endo/Heme/Allergies: Negative. Psychiatric/Behavioral: The patient is nervous/anxious. Objective:   Physical Exam   Constitutional: She appears cachectic. She is cooperative. No distress. HENT:   Head: Normocephalic and atraumatic. Right Ear: External ear normal.   Left Ear: External ear normal.   Nose: Nose normal.   Mouth/Throat: Oropharynx is clear and moist. Mucous membranes are dry. Dental caries present. Eyes: Conjunctivae and EOM are normal. Pupils are equal, round, and reactive to light. Neck: Normal range of motion. Neck supple. No tracheal deviation present. No thyromegaly present. Cardiovascular: Regular rhythm and intact distal pulses. Tachycardia present.   Exam reveals distant heart sounds. Pulmonary/Chest: Effort normal. She has decreased breath sounds in the right lower field and the left lower field. Abdominal: Soft. Bowel sounds are normal. There is no splenomegaly or hepatomegaly. Genitourinary: Rectal exam shows guaiac negative stool. Musculoskeletal: She exhibits no edema. Right hip: She exhibits decreased range of motion, bony tenderness and deformity. Neurological: She is alert. She is disoriented (to time). General weakness   Skin: Skin is warm, dry and intact. Capillary refill takes less than 2 seconds. No rash noted. Poor turgor   Psychiatric: Her mood appears anxious. Nursing note and vitals reviewed.       Medications:      dextrose 100 mL/hr at 10/31/17 0230      sodium chloride  250 mL Intravenous Once    linezolid  600 mg Intravenous Q12H    mometasone-formoterol  2 puff Inhalation BID    calcium carbonate  600 mg Oral Daily    atorvastatin  40 mg Oral Nightly    Roflumilast  500 mcg Oral Daily    sodium chloride flush  10 mL Intravenous 2 times per day    cefTRIAXone (ROCEPHIN) IV  1 g Intravenous Q24H    ipratropium-albuterol  1 ampule Inhalation Q4H WA     acetaminophen, diphenhydrAMINE, sodium chloride flush, magnesium hydroxide, ondansetron, morphine **OR** morphine  Diet NPO, After Midnight  Physical     VITALS:  /68   Pulse 107   Temp 97.6 °F (36.4 °C) (Temporal)   Resp 18   Ht 4' 2\" (1.27 m)   Wt 77 lb 6.4 oz (35.1 kg)   SpO2 98%   BMI 21.77 kg/m²    24HR INTAKE/OUTPUT:    Intake/Output Summary (Last 24 hours) at 10/31/17 0844  Last data filed at 10/31/17 0657   Gross per 24 hour   Intake           2561.1 ml   Output             1500 ml   Net           1061.1 ml       DVT Prophylaxis:  None at present  Sparrow Catheter: yes    Lab and other Data:     Recent Labs      10/29/17   1335  10/30/17   0310  10/31/17   0446   WBC  16.4*  10.6  11.9*   HGB  11.1*  8.8*  8.2*   HCT  34.4*  27.9*  26.7*   PLT  400  309  262

## 2017-10-31 NOTE — PLAN OF CARE
Problem: Risk for Impaired Skin Integrity  Goal: Tissue integrity - skin and mucous membranes  Structural intactness and normal physiological function of skin and  mucous membranes.    Outcome: Ongoing      Problem: Falls - Risk of  Goal: Absence of falls  Outcome: Ongoing      Problem: Pain:  Goal: Pain level will decrease  Pain level will decrease   Outcome: Ongoing    Goal: Control of acute pain  Control of acute pain   Outcome: Ongoing    Goal: Control of chronic pain  Control of chronic pain   Outcome: Ongoing      Problem: Mental Status - Impaired:  Goal: Mental status will improve  Mental status will improve   Outcome: Ongoing      Problem: Nutrition  Goal: Optimal nutrition therapy  Outcome: Ongoing

## 2017-11-01 LAB
ANION GAP SERPL CALCULATED.3IONS-SCNC: 11 MMOL/L (ref 7–19)
ANION GAP SERPL CALCULATED.3IONS-SCNC: 12 MMOL/L (ref 7–19)
BASOPHILS ABSOLUTE: 0 K/UL (ref 0–0.2)
BASOPHILS ABSOLUTE: 0 K/UL (ref 0–0.2)
BASOPHILS RELATIVE PERCENT: 0.1 % (ref 0–1)
BASOPHILS RELATIVE PERCENT: 0.1 % (ref 0–1)
BUN BLDV-MCNC: 29 MG/DL (ref 8–23)
BUN BLDV-MCNC: 38 MG/DL (ref 8–23)
CALCIUM SERPL-MCNC: 8.1 MG/DL (ref 8.8–10.2)
CALCIUM SERPL-MCNC: 8.3 MG/DL (ref 8.8–10.2)
CHLORIDE BLD-SCNC: 111 MMOL/L (ref 98–111)
CHLORIDE BLD-SCNC: 113 MMOL/L (ref 98–111)
CO2: 24 MMOL/L (ref 22–29)
CO2: 26 MMOL/L (ref 22–29)
CREAT SERPL-MCNC: 0.9 MG/DL (ref 0.5–0.9)
CREAT SERPL-MCNC: 1.1 MG/DL (ref 0.5–0.9)
EOSINOPHILS ABSOLUTE: 0 K/UL (ref 0–0.6)
EOSINOPHILS ABSOLUTE: 0 K/UL (ref 0–0.6)
EOSINOPHILS RELATIVE PERCENT: 0 % (ref 0–5)
EOSINOPHILS RELATIVE PERCENT: 0 % (ref 0–5)
GFR NON-AFRICAN AMERICAN: 49
GFR NON-AFRICAN AMERICAN: >60
GLUCOSE BLD-MCNC: 143 MG/DL (ref 74–109)
GLUCOSE BLD-MCNC: 163 MG/DL (ref 74–109)
HCT VFR BLD CALC: 21.8 % (ref 37–47)
HCT VFR BLD CALC: 25.9 % (ref 37–47)
HEMOGLOBIN: 6.8 G/DL (ref 12–16)
HEMOGLOBIN: 7.9 G/DL (ref 12–16)
LYMPHOCYTES ABSOLUTE: 0.2 K/UL (ref 1.1–4.5)
LYMPHOCYTES ABSOLUTE: 0.5 K/UL (ref 1.1–4.5)
LYMPHOCYTES RELATIVE PERCENT: 2.3 % (ref 20–40)
LYMPHOCYTES RELATIVE PERCENT: 2.9 % (ref 20–40)
MCH RBC QN AUTO: 30.2 PG (ref 27–31)
MCH RBC QN AUTO: 30.3 PG (ref 27–31)
MCHC RBC AUTO-ENTMCNC: 30.5 G/DL (ref 33–37)
MCHC RBC AUTO-ENTMCNC: 31.2 G/DL (ref 33–37)
MCV RBC AUTO: 96.9 FL (ref 81–99)
MCV RBC AUTO: 99.2 FL (ref 81–99)
MONOCYTES ABSOLUTE: 0.3 K/UL (ref 0–0.9)
MONOCYTES ABSOLUTE: 1.1 K/UL (ref 0–0.9)
MONOCYTES RELATIVE PERCENT: 2.8 % (ref 0–10)
MONOCYTES RELATIVE PERCENT: 7.3 % (ref 0–10)
NEUTROPHILS ABSOLUTE: 13.5 K/UL (ref 1.5–7.5)
NEUTROPHILS ABSOLUTE: 9.9 K/UL (ref 1.5–7.5)
NEUTROPHILS RELATIVE PERCENT: 88.7 % (ref 50–65)
NEUTROPHILS RELATIVE PERCENT: 94.1 % (ref 50–65)
PDW BLD-RTO: 14.6 % (ref 11.5–14.5)
PDW BLD-RTO: 14.8 % (ref 11.5–14.5)
PLATELET # BLD: 227 K/UL (ref 130–400)
PLATELET # BLD: 267 K/UL (ref 130–400)
PMV BLD AUTO: 10.1 FL (ref 9.4–12.3)
PMV BLD AUTO: 10.2 FL (ref 9.4–12.3)
POTASSIUM SERPL-SCNC: 3.7 MMOL/L (ref 3.5–5)
POTASSIUM SERPL-SCNC: 4.1 MMOL/L (ref 3.5–5)
RBC # BLD: 2.25 M/UL (ref 4.2–5.4)
RBC # BLD: 2.61 M/UL (ref 4.2–5.4)
SODIUM BLD-SCNC: 146 MMOL/L (ref 136–145)
SODIUM BLD-SCNC: 151 MMOL/L (ref 136–145)
WBC # BLD: 10.5 K/UL (ref 4.8–10.8)
WBC # BLD: 15.3 K/UL (ref 4.8–10.8)

## 2017-11-01 PROCEDURE — G8997 SWALLOW GOAL STATUS: HCPCS

## 2017-11-01 PROCEDURE — 6370000000 HC RX 637 (ALT 250 FOR IP): Performed by: INTERNAL MEDICINE

## 2017-11-01 PROCEDURE — 80048 BASIC METABOLIC PNL TOTAL CA: CPT

## 2017-11-01 PROCEDURE — 99233 SBSQ HOSP IP/OBS HIGH 50: CPT | Performed by: HOSPITALIST

## 2017-11-01 PROCEDURE — 85025 COMPLETE CBC W/AUTO DIFF WBC: CPT

## 2017-11-01 PROCEDURE — 92610 EVALUATE SWALLOWING FUNCTION: CPT

## 2017-11-01 PROCEDURE — 94640 AIRWAY INHALATION TREATMENT: CPT

## 2017-11-01 PROCEDURE — 2580000003 HC RX 258: Performed by: NURSE PRACTITIONER

## 2017-11-01 PROCEDURE — 2580000003 HC RX 258: Performed by: ORTHOPAEDIC SURGERY

## 2017-11-01 PROCEDURE — 2700000000 HC OXYGEN THERAPY PER DAY

## 2017-11-01 PROCEDURE — 36415 COLL VENOUS BLD VENIPUNCTURE: CPT

## 2017-11-01 PROCEDURE — 6370000000 HC RX 637 (ALT 250 FOR IP): Performed by: ORTHOPAEDIC SURGERY

## 2017-11-01 PROCEDURE — G8996 SWALLOW CURRENT STATUS: HCPCS

## 2017-11-01 PROCEDURE — 6370000000 HC RX 637 (ALT 250 FOR IP): Performed by: NURSE PRACTITIONER

## 2017-11-01 PROCEDURE — 1210000000 HC MED SURG R&B

## 2017-11-01 PROCEDURE — 2580000003 HC RX 258: Performed by: FAMILY MEDICINE

## 2017-11-01 PROCEDURE — 6360000002 HC RX W HCPCS: Performed by: HOSPITALIST

## 2017-11-01 RX ORDER — DIAPER,BRIEF,INFANT-TODD,DISP
EACH MISCELLANEOUS 2 TIMES DAILY
Status: DISCONTINUED | OUTPATIENT
Start: 2017-11-01 | End: 2017-11-04 | Stop reason: HOSPADM

## 2017-11-01 RX ORDER — 0.9 % SODIUM CHLORIDE 0.9 %
1000 INTRAVENOUS SOLUTION INTRAVENOUS ONCE
Status: COMPLETED | OUTPATIENT
Start: 2017-11-01 | End: 2017-11-01

## 2017-11-01 RX ORDER — DEXTROSE MONOHYDRATE 50 MG/ML
INJECTION, SOLUTION INTRAVENOUS CONTINUOUS
Status: DISCONTINUED | OUTPATIENT
Start: 2017-11-01 | End: 2017-11-02

## 2017-11-01 RX ADMIN — DEXTROSE MONOHYDRATE: 50 INJECTION, SOLUTION INTRAVENOUS at 22:59

## 2017-11-01 RX ADMIN — DOCUSATE SODIUM 100 MG: 100 CAPSULE, LIQUID FILLED ORAL at 21:50

## 2017-11-01 RX ADMIN — LINEZOLID 600 MG: 600 INJECTION, SOLUTION INTRAVENOUS at 22:59

## 2017-11-01 RX ADMIN — MOMETASONE FUROATE AND FORMOTEROL FUMARATE DIHYDRATE 2 PUFF: 200; 5 AEROSOL RESPIRATORY (INHALATION) at 08:50

## 2017-11-01 RX ADMIN — DOCUSATE SODIUM 100 MG: 100 CAPSULE, LIQUID FILLED ORAL at 11:16

## 2017-11-01 RX ADMIN — Medication 10 ML: at 13:37

## 2017-11-01 RX ADMIN — METOPROLOL TARTRATE 25 MG: 25 TABLET ORAL at 11:15

## 2017-11-01 RX ADMIN — LINEZOLID 600 MG: 600 INJECTION, SOLUTION INTRAVENOUS at 00:30

## 2017-11-01 RX ADMIN — Medication 600 MG: at 11:16

## 2017-11-01 RX ADMIN — HYDROMORPHONE HYDROCHLORIDE 1 MG: 2 TABLET ORAL at 16:16

## 2017-11-01 RX ADMIN — LINEZOLID 600 MG: 600 INJECTION, SOLUTION INTRAVENOUS at 11:16

## 2017-11-01 RX ADMIN — IPRATROPIUM BROMIDE AND ALBUTEROL SULFATE 1 AMPULE: .5; 3 SOLUTION RESPIRATORY (INHALATION) at 18:22

## 2017-11-01 RX ADMIN — MOMETASONE FUROATE AND FORMOTEROL FUMARATE DIHYDRATE 2 PUFF: 200; 5 AEROSOL RESPIRATORY (INHALATION) at 21:45

## 2017-11-01 RX ADMIN — ATORVASTATIN CALCIUM 40 MG: 20 TABLET, FILM COATED ORAL at 21:50

## 2017-11-01 RX ADMIN — BACITRACIN ZINC: 500 OINTMENT TOPICAL at 21:51

## 2017-11-01 RX ADMIN — IPRATROPIUM BROMIDE AND ALBUTEROL SULFATE 1 AMPULE: .5; 3 SOLUTION RESPIRATORY (INHALATION) at 06:23

## 2017-11-01 RX ADMIN — ASPIRIN 81 MG: 81 TABLET, COATED ORAL at 11:28

## 2017-11-01 RX ADMIN — Medication 10 ML: at 21:47

## 2017-11-01 RX ADMIN — IPRATROPIUM BROMIDE AND ALBUTEROL SULFATE 1 AMPULE: .5; 3 SOLUTION RESPIRATORY (INHALATION) at 14:17

## 2017-11-01 RX ADMIN — ASPIRIN 81 MG: 81 TABLET, COATED ORAL at 21:50

## 2017-11-01 RX ADMIN — DEXTROSE MONOHYDRATE: 50 INJECTION, SOLUTION INTRAVENOUS at 13:37

## 2017-11-01 RX ADMIN — SODIUM CHLORIDE 1000 ML: 9 INJECTION, SOLUTION INTRAVENOUS at 20:43

## 2017-11-01 RX ADMIN — IPRATROPIUM BROMIDE AND ALBUTEROL SULFATE 1 AMPULE: .5; 3 SOLUTION RESPIRATORY (INHALATION) at 02:37

## 2017-11-01 RX ADMIN — IPRATROPIUM BROMIDE AND ALBUTEROL SULFATE 1 AMPULE: .5; 3 SOLUTION RESPIRATORY (INHALATION) at 10:32

## 2017-11-01 NOTE — PROGRESS NOTES
appeared to be mild-moderately decreased.)  Throat Clearing - Delayed: Thin - cup (Delayed throat clears were noted following thin H2O trials.)  Pharyngeal: No outward S/S penetration/aspiration was noted with any puree thick H2O trial, mechanical soft consistency trial, or honey thick H2O trial. Despite exhibiting fast oral transit and suspected swallow delay, no outward S/S penetration/aspiration was observed with any nectar thick H2O trial. As previously mentioned, patient exhibited S/S penetration/aspiration with thin H2O trials with delayed throat clear noted. At this time, would recommend puree consistency and honey thick liquids. Meds crushed in puree. TOTAL FEED. Okay for ice chips in between meals for comfort. G-Code  SLP G-Codes  Functional Limitations: Swallowing  Swallow Current Status (): At least 60 percent but less than 80 percent impaired, limited or restricted  Swallow Goal Status ():  At least 40 percent but less than 60 percent impaired, limited or restricted    Electronically signed by PHLI Mills on 11/1/2017 at 11:28 AM

## 2017-11-01 NOTE — PROGRESS NOTES
Subjective:     Post-Operative Day: 1 labored breathing worry for past aspiration. Objective:     Patient Vitals for the past 24 hrs:   BP Temp Temp src Pulse Resp SpO2   11/01/17 0643 (!) 144/82 97.1 °F (36.2 °C) Temporal 112 14 97 %   11/01/17 0323 134/72 97.5 °F (36.4 °C) Temporal 107 16 100 %   10/31/17 2320 125/70 98.4 °F (36.9 °C) Temporal 110 16 98 %   10/31/17 1930 (!) 94/54 98 °F (36.7 °C) Temporal 102 14 92 %   10/31/17 1705 (!) 103/50 - - 106 12 91 %   10/31/17 1700 (!) 107/59 97.8 °F (36.6 °C) Temporal 105 13 92 %   10/31/17 1645 (!) 108/41 97.8 °F (36.6 °C) Temporal 106 14 97 %   10/31/17 1640 (!) 118/59 97.8 °F (36.6 °C) Temporal 106 14 94 %   10/31/17 1638 106/60 97.8 °F (36.6 °C) - 106 15 94 %   10/31/17 1630 (!) 103/45 - - 107 15 96 %   10/31/17 1625 99/68 97.8 °F (36.6 °C) Temporal 105 16 98 %   10/31/17 1605 117/67 97.8 °F (36.6 °C) Temporal 103 16 93 %   10/31/17 1540 110/83 97.8 °F (36.6 °C) Temporal 101 15 94 %   10/31/17 1530 (!) 116/56 97.8 °F (36.6 °C) Temporal 98 11 93 %   10/31/17 1525 (!) 112/50 - - 100 12 99 %   10/31/17 1520 (!) 111/52 - - 101 11 99 %   10/31/17 1517 (!) 111/52 97 °F (36.1 °C) Temporal 101 12 -   10/31/17 1103 117/62 98.6 °F (37 °C) Temporal 119 14 99 %       General: Alert and oriented x2, appears older than stated age. Wound: Wound clean, dry and intact    Neurovascular: Exam normal   DVT Exam: No evidence of DVT seen on physical exam.  Negative Karon's sign. No cords or calf tenderness. No significant calf/ankle edema. Data Review:  Recent Labs      10/31/17   0446  11/01/17 0224   HGB  8.2*  7.9*     Recent Labs      11/01/17 0224   NA  151*   K  4.1   CREATININE  1.1*         Assessment:     Status Post right TFN placement of intertroch fracture. Patient had difficulty swallowing and breathing and dropping 02 saturation. Placed of nasal cannula and chest XR taken showing findings supportive of COPD no aspiration.  Sacral pressure ulcer wound

## 2017-11-01 NOTE — CARE COORDINATION
Spoke to patient and family regarding MD orders or home health services. They feel patient will need some type of rehab prior to going home. Wilbur Shelby planner, notified of their request.  Will sign off.   Electronically signed by Saúl Block RN on 11/1/17 at 12:15 PM

## 2017-11-01 NOTE — PROGRESS NOTES
10/31/17   0446  11/01/17   0224   NA  155*  151*  151*   K  4.4  3.9  4.1   ANIONGAP  18  10  12   CL  117*  114*  113*   CO2  20*  27  26   BUN  78*  57*  38*   CREATININE  1.9*  1.5*  1.1*   GLUCOSE  172*  209*  143*   CALCIUM  7.9*  8.1*  8.3*     Recent Labs      10/30/17   0310   MG  2.8*   PHOS  6.3*     Lab Results   Component Value Date    CALCIUM 8.3 (L) 11/01/2017    PHOS 6.3 (H) 10/30/2017     INR:   Recent Labs      10/30/17   0310   INR  1.05     ABGs:   Lab Results   Component Value Date    PHART 7.230 10/29/2017    PO2ART 98.0 10/29/2017    ZSB6DSY 42.0 10/29/2017     UA:  Recent Labs      10/29/17   1640   COLORU  YELLOW   PHUR  5.5   WBCUA  3-5*   RBCUA  3-5*   YEAST  Rare*   BACTERIA  1+   CLARITYU  TURBID*   SPECGRAV  1.014   LEUKOCYTESUR  SMALL*   UROBILINOGEN  0.2   BILIRUBINUR  MODERATE*   BLOODU  LARGE*   GLUCOSEU  Negative   AMORPHOUS  1+*     Urine Culture Brief :   Lab Results   Component Value Date    LABURIN  10/29/2017     >50,000 CFU/ml mixed skin/urogenital ramos. No further workup        Organism Brief Blood culture #1:   Lab Results   Component Value Date    ORG Streptococcus viridans group 10/29/2017     Blood Culture #1:   Recent Labs      10/29/17   1530   BC  No further workup  Isolated from Aerobic bottle  Isolated from one bottle only. Susceptibility testing is not  routinely done as these organism frequently represents skin  contamination in blood cultures. If testing is indicated,  please contact Lab within 7 days. XR Hip  Impression:      Acute angulated comminuted intertrochanteric fracture of the right femur. Signed by Dr Jenna Donohue on 10/29/2017 8:18 PM    Chest Xray  Impression:     1. Chronic lung changes with no acute disease. Signed by Dr Frederic Strickland on 10/29/2017 3:53 PM    CT Lumbar spine wo constrast  Impression:     1. Chronic compression fractures of L1 and, less so, L2-L3. 2. Degenerative disc and facet changes.   3. No acute fracture is Output             1500 ml   Net            -1160 ml     General appearance: alert and cooperative with exam  HEENT: atraumatic, eyes with clear conjunctiva and normal lids, pupils and irises normal, external ears and nose are normal, lips normal. Neck without masses, lympadenopathy, bruit, thyroid normal  Lungs: no increased work of breathing, clear to auscultation bilaterally without rales, rhonchi or wheezes  Heart: regular rate and rhythm, S1, S2 normal, no murmur, click, rub or gallop  Abdomen: soft, non-tender; bowel sounds normal; no masses,  no organomegaly  Extremities: 3 extremities normal, atraumatic, no cyanosis or edema - postop changes R hip  Neurologic: No focal neurologic deficits, normal sensation, alert and oriented, affect and mood appropriate. Skin: no rashes, nodules. A/P: Stable s/p ORIF R hip 10/31/17 as outlined above, adjusted IVF for her hypernatremia, monitor daily.

## 2017-11-01 NOTE — PROGRESS NOTES
Nursing called reporting patient is having difficulty swallowing puree and had lower oxygen levels. ST consult noted and pending assessment due to post op status. Will npo and review CXR portable. Consider switching Metoprolol to IV if swallowing difficulty is prominent or persistent. Domo Copeland M.D.   Hospitalist service  10/31/2017  8:19 PM

## 2017-11-02 ENCOUNTER — APPOINTMENT (OUTPATIENT)
Dept: GENERAL RADIOLOGY | Age: 67
DRG: 956 | End: 2017-11-02
Payer: MEDICARE

## 2017-11-02 LAB
ABO/RH: NORMAL
ANION GAP SERPL CALCULATED.3IONS-SCNC: 12 MMOL/L (ref 7–19)
ANTIBODY SCREEN: NORMAL
BASOPHILS ABSOLUTE: 0 K/UL (ref 0–0.2)
BASOPHILS RELATIVE PERCENT: 0.1 % (ref 0–1)
BLOOD BANK DISPENSE STATUS: NORMAL
BLOOD BANK PRODUCT CODE: NORMAL
BPU ID: NORMAL
BUN BLDV-MCNC: 25 MG/DL (ref 8–23)
CALCIUM SERPL-MCNC: 8.2 MG/DL (ref 8.8–10.2)
CHLORIDE BLD-SCNC: 109 MMOL/L (ref 98–111)
CO2: 23 MMOL/L (ref 22–29)
CREAT SERPL-MCNC: 1 MG/DL (ref 0.5–0.9)
DESCRIPTION BLOOD BANK: NORMAL
EOSINOPHILS ABSOLUTE: 0 K/UL (ref 0–0.6)
EOSINOPHILS RELATIVE PERCENT: 0 % (ref 0–5)
GFR NON-AFRICAN AMERICAN: 55
GLUCOSE BLD-MCNC: 172 MG/DL (ref 74–109)
HCT VFR BLD CALC: 22.9 % (ref 37–47)
HEMOGLOBIN: 6.8 G/DL (ref 12–16)
LYMPHOCYTES ABSOLUTE: 0.7 K/UL (ref 1.1–4.5)
LYMPHOCYTES RELATIVE PERCENT: 3.8 % (ref 20–40)
MCH RBC QN AUTO: 30 PG (ref 27–31)
MCHC RBC AUTO-ENTMCNC: 29.7 G/DL (ref 33–37)
MCV RBC AUTO: 100.9 FL (ref 81–99)
MONOCYTES ABSOLUTE: 1.1 K/UL (ref 0–0.9)
MONOCYTES RELATIVE PERCENT: 6.5 % (ref 0–10)
NEUTROPHILS ABSOLUTE: 15.2 K/UL (ref 1.5–7.5)
NEUTROPHILS RELATIVE PERCENT: 88.4 % (ref 50–65)
PDW BLD-RTO: 14.8 % (ref 11.5–14.5)
PLATELET # BLD: 250 K/UL (ref 130–400)
PMV BLD AUTO: 10.3 FL (ref 9.4–12.3)
POTASSIUM SERPL-SCNC: 3.6 MMOL/L (ref 3.5–5)
RBC # BLD: 2.27 M/UL (ref 4.2–5.4)
SODIUM BLD-SCNC: 144 MMOL/L (ref 136–145)
TOTAL CK: 545 U/L (ref 26–192)
WBC # BLD: 17.1 K/UL (ref 4.8–10.8)

## 2017-11-02 PROCEDURE — 87040 BLOOD CULTURE FOR BACTERIA: CPT

## 2017-11-02 PROCEDURE — 86850 RBC ANTIBODY SCREEN: CPT

## 2017-11-02 PROCEDURE — 36430 TRANSFUSION BLD/BLD COMPNT: CPT

## 2017-11-02 PROCEDURE — 80048 BASIC METABOLIC PNL TOTAL CA: CPT

## 2017-11-02 PROCEDURE — 94640 AIRWAY INHALATION TREATMENT: CPT

## 2017-11-02 PROCEDURE — 6370000000 HC RX 637 (ALT 250 FOR IP): Performed by: INTERNAL MEDICINE

## 2017-11-02 PROCEDURE — 85025 COMPLETE CBC W/AUTO DIFF WBC: CPT

## 2017-11-02 PROCEDURE — 6360000002 HC RX W HCPCS: Performed by: PHYSICIAN ASSISTANT

## 2017-11-02 PROCEDURE — 99233 SBSQ HOSP IP/OBS HIGH 50: CPT | Performed by: HOSPITALIST

## 2017-11-02 PROCEDURE — P9016 RBC LEUKOCYTES REDUCED: HCPCS

## 2017-11-02 PROCEDURE — G8978 MOBILITY CURRENT STATUS: HCPCS

## 2017-11-02 PROCEDURE — 2580000003 HC RX 258: Performed by: ORTHOPAEDIC SURGERY

## 2017-11-02 PROCEDURE — 97162 PT EVAL MOD COMPLEX 30 MIN: CPT

## 2017-11-02 PROCEDURE — G8987 SELF CARE CURRENT STATUS: HCPCS

## 2017-11-02 PROCEDURE — 6370000000 HC RX 637 (ALT 250 FOR IP): Performed by: ORTHOPAEDIC SURGERY

## 2017-11-02 PROCEDURE — 2700000000 HC OXYGEN THERAPY PER DAY

## 2017-11-02 PROCEDURE — 6370000000 HC RX 637 (ALT 250 FOR IP): Performed by: HOSPITALIST

## 2017-11-02 PROCEDURE — 86900 BLOOD TYPING SEROLOGIC ABO: CPT

## 2017-11-02 PROCEDURE — 1210000000 HC MED SURG R&B

## 2017-11-02 PROCEDURE — 97165 OT EVAL LOW COMPLEX 30 MIN: CPT

## 2017-11-02 PROCEDURE — 82550 ASSAY OF CK (CPK): CPT

## 2017-11-02 PROCEDURE — G8979 MOBILITY GOAL STATUS: HCPCS

## 2017-11-02 PROCEDURE — 2580000003 HC RX 258: Performed by: FAMILY MEDICINE

## 2017-11-02 PROCEDURE — 36415 COLL VENOUS BLD VENIPUNCTURE: CPT

## 2017-11-02 PROCEDURE — 6370000000 HC RX 637 (ALT 250 FOR IP): Performed by: FAMILY MEDICINE

## 2017-11-02 PROCEDURE — 86901 BLOOD TYPING SEROLOGIC RH(D): CPT

## 2017-11-02 PROCEDURE — G8988 SELF CARE GOAL STATUS: HCPCS

## 2017-11-02 PROCEDURE — 92526 ORAL FUNCTION THERAPY: CPT

## 2017-11-02 PROCEDURE — 71010 XR CHEST PORTABLE: CPT

## 2017-11-02 PROCEDURE — 6360000002 HC RX W HCPCS: Performed by: HOSPITALIST

## 2017-11-02 RX ORDER — HYDROCODONE BITARTRATE AND ACETAMINOPHEN 10; 325 MG/1; MG/1
1 TABLET ORAL EVERY 6 HOURS PRN
Status: DISCONTINUED | OUTPATIENT
Start: 2017-11-02 | End: 2017-11-04 | Stop reason: HOSPADM

## 2017-11-02 RX ORDER — SODIUM HYPOCHLORITE 1.25 MG/ML
SOLUTION TOPICAL DAILY
Status: DISCONTINUED | OUTPATIENT
Start: 2017-11-02 | End: 2017-11-04 | Stop reason: HOSPADM

## 2017-11-02 RX ORDER — FUROSEMIDE 10 MG/ML
20 INJECTION INTRAMUSCULAR; INTRAVENOUS ONCE
Status: COMPLETED | OUTPATIENT
Start: 2017-11-02 | End: 2017-11-02

## 2017-11-02 RX ORDER — 0.9 % SODIUM CHLORIDE 0.9 %
250 INTRAVENOUS SOLUTION INTRAVENOUS ONCE
Status: COMPLETED | OUTPATIENT
Start: 2017-11-02 | End: 2017-11-02

## 2017-11-02 RX ADMIN — FUROSEMIDE 20 MG: 10 INJECTION INTRAMUSCULAR; INTRAVENOUS at 11:36

## 2017-11-02 RX ADMIN — SODIUM CHLORIDE 250 ML: 9 INJECTION, SOLUTION INTRAVENOUS at 04:15

## 2017-11-02 RX ADMIN — BACITRACIN ZINC: 500 OINTMENT TOPICAL at 21:20

## 2017-11-02 RX ADMIN — DOCUSATE SODIUM 100 MG: 100 CAPSULE, LIQUID FILLED ORAL at 09:04

## 2017-11-02 RX ADMIN — LINEZOLID 600 MG: 600 INJECTION, SOLUTION INTRAVENOUS at 11:36

## 2017-11-02 RX ADMIN — IPRATROPIUM BROMIDE AND ALBUTEROL SULFATE 1 AMPULE: .5; 3 SOLUTION RESPIRATORY (INHALATION) at 18:56

## 2017-11-02 RX ADMIN — DOCUSATE SODIUM 100 MG: 100 CAPSULE, LIQUID FILLED ORAL at 21:20

## 2017-11-02 RX ADMIN — Medication 10 ML: at 21:21

## 2017-11-02 RX ADMIN — ATORVASTATIN CALCIUM 40 MG: 20 TABLET, FILM COATED ORAL at 21:19

## 2017-11-02 RX ADMIN — MOMETASONE FUROATE AND FORMOTEROL FUMARATE DIHYDRATE 2 PUFF: 200; 5 AEROSOL RESPIRATORY (INHALATION) at 21:19

## 2017-11-02 RX ADMIN — Medication 600 MG: at 09:04

## 2017-11-02 RX ADMIN — COLLAGENASE SANTYL: 250 OINTMENT TOPICAL at 21:20

## 2017-11-02 RX ADMIN — HYDROCODONE BITARTRATE AND ACETAMINOPHEN 1 TABLET: 10; 325 TABLET ORAL at 14:34

## 2017-11-02 RX ADMIN — ASPIRIN 81 MG: 81 TABLET, COATED ORAL at 09:04

## 2017-11-02 RX ADMIN — Medication 10 ML: at 09:04

## 2017-11-02 RX ADMIN — BACITRACIN ZINC: 500 OINTMENT TOPICAL at 11:36

## 2017-11-02 RX ADMIN — MOMETASONE FUROATE AND FORMOTEROL FUMARATE DIHYDRATE 2 PUFF: 200; 5 AEROSOL RESPIRATORY (INHALATION) at 09:04

## 2017-11-02 RX ADMIN — ASPIRIN 81 MG: 81 TABLET, COATED ORAL at 21:20

## 2017-11-02 RX ADMIN — METOPROLOL TARTRATE 25 MG: 25 TABLET ORAL at 09:04

## 2017-11-02 ASSESSMENT — PAIN DESCRIPTION - PAIN TYPE: TYPE: SURGICAL PAIN

## 2017-11-02 ASSESSMENT — PAIN SCALES - WONG BAKER: WONGBAKER_NUMERICALRESPONSE: 2

## 2017-11-02 ASSESSMENT — PAIN SCALES - GENERAL: PAINLEVEL_OUTOF10: 7

## 2017-11-02 ASSESSMENT — PAIN DESCRIPTION - LOCATION: LOCATION: HIP

## 2017-11-02 ASSESSMENT — PAIN DESCRIPTION - DESCRIPTORS: DESCRIPTORS: ACHING

## 2017-11-02 ASSESSMENT — PAIN DESCRIPTION - ORIENTATION: ORIENTATION: RIGHT

## 2017-11-02 NOTE — PROGRESS NOTES
[facility]      Patient:  Tawnya Dickens  YOB: 1950  Date of Service: 11/2/2017  MRN: [id number]   Acct: [de-identified]   Primary Care Physician: Evans Corona MD  Advance Directive: Full Code  Admit Date: 10/29/2017       Hospital Day: 4    CHIEF COMPLAINT Dyspnea    SUBJECTIVE:  Ms. Aaron Arambula complains of worsening dyspnea associated with non productive cough and fatigue. She denies hemoptysis. Denies chest pain or heart palpitations. CUMULATIVE HOSPITAL HISTORY: The patient is a 69 y.o. female who was brought in by EMS after being found down by friend. History supplemented from chart review as pt unable to give reliable history.   She was unable to describe how she ended up on floor or how long she had been there. Pt did mention some RLE pain when she was moving in bed. Denied any CP, SOB, abd pain, urinary symptoms.     On 10/31/17, patient underwent right hip fracture open reduction and internal fixation with delano cephalomedullary nail. Review of Systems:   14 point review of systems is negative except as specifically addressed above. Objective:   VITALS:  /85   Pulse 79   Temp 97 °F (36.1 °C) (Temporal)   Resp 17   Ht 4' 2\" (1.27 m)   Wt 114 lb 9.6 oz (52 kg) Comment: bed weight. pt is on BR  SpO2 95% Comment: after PT  BMI 32.23 kg/m²   24HR INTAKE/OUTPUT:      Intake/Output Summary (Last 24 hours) at 11/02/17 1430  Last data filed at 11/02/17 1414   Gross per 24 hour   Intake          2710.08 ml   Output             1850 ml   Net           860.08 ml     General appearance: alert, appears stated age, cooperative and mild distress, frail appearing  Head: Normocephalic, without obvious abnormality, atraumatic  Eyes: conjunctivae/corneas clear. PERRL, EOM's intact.    Ears: normal external ears and nose, throat without exudate  Neck: no adenopathy, no carotid bruit, no JVD, supple, symmetrical, trachea midline and thyroid not enlarged, symmetric, no tenderness/mass/nodules  Lungs: Coarse/diminished air exchange with diffuse expiratory wheezes, few bibasilar rales, no rhonchi  Heart: regular rate and rhythm, S1, S2 normal, no murmur  Abdomen:soft, non-tender; non-distended, normal bowel sounds no masses, no organomegaly  Extremities:Trace lower extremity edema,  No erythema, There is expected post operative tenderness to palpation RLE  Skin: Skin color, texture, turgor normal. No rashes. Multiple areas of break down to right/left ischial hip, coccyx/sacral area. Right arm with mild edema, erythema  Lymphatic: No palpable lymph node enlargment  Neurologic: Alert and oriented X 3, generalized weakness and normal tone. Normal symmetric reflexes. Mental status: Alert, oriented, thought content appropriate  Cranial nerves:II-XII Grossly intact Sensory: normal Motor:grossly normal  Psychiatric: Alert and oriented, anxious, thought content appropriate, normal insight, mood appropriate    Medications:       sodium chloride  250 mL Intravenous Once    bacitracin zinc   Topical BID    linezolid  600 mg Intravenous Q12H    metoprolol tartrate  25 mg Oral Daily    sodium chloride flush  10 mL Intravenous 2 times per day    docusate sodium  100 mg Oral BID    aspirin  81 mg Oral BID    mometasone-formoterol  2 puff Inhalation BID    calcium carbonate  600 mg Oral Daily    atorvastatin  40 mg Oral Nightly    ipratropium-albuterol  1 ampule Inhalation Q4H WA     HYDROcodone-acetaminophen, sodium chloride flush, [MAR Hold] diphenhydrAMINE, sodium chloride flush, magnesium hydroxide, ondansetron, [MAR Hold] morphine **OR** [MAR Hold] morphine  DIET DYSPHAGIA I PUREED;  Honey Thick     Lab and other Data:     Recent Labs      11/01/17 0224 11/01/17 2118 11/02/17 0211   WBC  10.5  15.3*  17.1*   HGB  7.9*  6.8*  6.8*   PLT  267  227  250     Recent Labs      11/01/17 0224 11/01/17 2118 11/02/17 0211   NA  151*  146*  144   K  4.1  3.7  3.6   CL  113* duonebs, IS, deep breathing, continue Zyvox for now    Rhabdomyolysis-Resolving    Fall-PT/OT when able    Acute cystitis without hematuria-ruled out, culture neg    Hypernatremia-resolved    Gram-positive bacteremia-likely contaminant, will repeat blood cultures, if initial growth neg, DC abx     FRANCIA (acute kidney injury) (HCC)-resolving    Metabolic encephalopathy-improving    Acute blood loss anemia-receiving 2 units PRBC, will also give Lasix 20 mg x 1 dose given worsening dyspnea while receiving PRBC, monitor creatinine closely as well as CK given rhabdo    Leukocytosis-worsening, repeat blood cultures, check CXR, urine culture neg, continue Zyvox for now     Antibiotic: Zyvox    DVT Prophylaxis: Lovenox held 2' profound acute blood loss anemia    Brian Chávez PA-C     I have independently interviewed and examined the patient. I have discussed key elements of the care plan with the PA or APRN & agree with the findings and care plan as documented above. Henry Rosario MD    CC: Feeling better  S: Dilaudid \"makes her loopy\" per family, want to go to home dose of Norco, done. O:Vitals: /85   Pulse 79   Temp 97 °F (36.1 °C) (Temporal)   Resp 17   Ht 4' 2\" (1.27 m)   Wt 114 lb 9.6 oz (52 kg) Comment: bed weight.  pt is on BR  SpO2 95% Comment: after PT  BMI 32.23 kg/m²   24HR INTAKE/OUTPUT:      Intake/Output Summary (Last 24 hours) at 11/02/17 1430  Last data filed at 11/02/17 1414   Gross per 24 hour   Intake          2710.08 ml   Output             1850 ml   Net           860.08 ml     General appearance: alert and cooperative with exam  HEENT: atraumatic, eyes with clear conjunctiva and normal lids, pupils and irises normal, external ears and nose are normal, lips normal. Neck without masses, lympadenopathy, bruit, thyroid normal  Lungs: no increased work of breathing, clear to auscultation bilaterally without rales, rhonchi or wheezes  Heart: regular rate and rhythm, S1, S2 normal, no murmur, click, rub or gallop  Abdomen: soft, non-tender; bowel sounds normal; no masses,  no organomegaly  Extremities: 3 extremities normal, atraumatic, no cyanosis or edema - postop changes R hip  Neurologic: No focal neurologic deficits, normal sensation, alert and oriented, affect and mood appropriate. Skin: no rashes, nodules. A/P: Stable s/p ORIF R hip 10/31/17 as outlined above, adjusted IVF for her hypernatremia (improving), monitor daily, back to [place] per family and patient preference, they believe better pain relief and less side effects.

## 2017-11-02 NOTE — PROGRESS NOTES
Nutrition Assessment    Type and Reason for Visit: Reassess    Nutrition Recommendations: ONS TID    Malnutrition Assessment:  · Malnutrition Status: Meets the criteria for severe malnutrition  · Context: Acute illness or injury  · Findings of the 6 clinical characteristics of malnutrition (Minimum of 2 out of 6 clinical characteristics is required to make the diagnosis of moderate or severe Protein Calorie Malnutrition based on AND/ASPEN Guidelines):  1. Energy Intake-Less than or equal to 50%, greater than or equal to 5 days    2. Weight Loss-Unable to assess, unable to assess  3. Fat Loss-Moderate subcutaneous fat loss, Orbital, Triceps, Fat overlying the ribs  4. Muscle Loss-Severe muscle mass loss, Temples (temporalis muscle), Clavicles (pectoralis and deltoids)  5. Fluid Accumulation-No significant fluid accumulation, Extremities  6.  Strength-Not measured    Nutrition Diagnosis:   · Problem: Inadequate oral intake  · Etiology: related to Insufficient energy/nutrient consumption, Increased demand for energy/nutrients due to     Signs and symptoms:  as evidenced by Intake 0-25%, Presence of wounds    Nutrition Assessment:  · Subjective Assessment: Pt claims she is hungry, but does not have good food to choose from. Visitors seem to think it is mostly related to the texture of the food. Pt is willing to try an Ensure milkshake made with a Magic Cup and thickened to appropriate consistency. Food preferences were obtained and will be updated in the diet order.   · Nutrition-Focused Physical Findings: \"cachectic\" per MD progress note  · Wound Type: Skin Tears, Unstageable, Pressure Ulcer  · Current Nutrition Therapies:  · Oral Diet Orders: Dysphagia 1 (Pureed), Honey Thick   · Oral Diet intake: 1-25%  · Oral Nutrition Supplement (ONS) Orders: None  · ONS intake: Unable to assess  · Anthropometric Measures:  · Ht: 4' 2\" (127 cm)   · Current Body Wt: 77 lb 6 oz (35.1 kg)  · Admission Body Wt: 77 lb 2 oz (35

## 2017-11-02 NOTE — PROGRESS NOTES
Call placed to Guanako Garcia, patient's son, to get consent to give patient, his mother, blood. Consent was given by phone to Loki Lyn RN and Katie Pa RN.  Consent was signed by both nurses Electronically signed by Loki Lyn RN on 11/2/2017 at 12:26 AM

## 2017-11-02 NOTE — PROGRESS NOTES
Occupational Therapy   Occupational Therapy Initial Assessment  Date: 2017   Patient Name: Juan Carlos Elmore  MRN: [id number]     : 1950    Patient Diagnosis(es): The primary encounter diagnosis was Fall, initial encounter. Diagnoses of Traumatic rhabdomyolysis, initial encounter ([facility] [address] Acute renal failure, unspecified acute renal failure type ([facility] [address] Hyperkalemia, Elevated troponin, and Urinary tract infection without hematuria, site unspecified were also pertinent to this visit. has a past medical history of FRANCIA (acute kidney injury) ([facility] [address] Allergic rhinitis; Asthma; Cerebral artery occlusion; Cerebral artery occlusion with cerebral infarction ([facility] [address] Chronic back pain; COPD (chronic obstructive pulmonary disease) ([facility] [address] Depression; Hyperlipidemia; Hypertension; Hyponatremia; Osteoarthritis; Osteoporosis; and Pneumonia. has a past surgical history that includes Ankle fracture surgery (Right); Tubal ligation; cyst removal (Left); eye surgery (Left, 2016); Hysterectomy; colonoscopy flx dx w/collj spec when pfrmd (N/A, 10/7/2016); Colonoscopy (10/07/2016); and Femur fracture surgery (Right, 10/31/2017). Treatment Diagnosis: R hip ORIF, rhabdomyolysis      Restrictions  Restrictions/Precautions  Restrictions/Precautions: Weight Bearing, Fall Risk  Required Braces or Orthoses?: No  Lower Extremity Weight Bearing Restrictions  Right Lower Extremity Weight Bearing: Toe Touch Weight Bearing    Subjective   General  Chart Reviewed: Yes  Patient assessed for rehabilitation services?: Yes  Family / Caregiver Present: No  Diagnosis: R hip ORIF, rhabdomyolysis  General Comment  Comments: Pt. on pressure-relief mattress, willing to try and stand at bedside.   Pain Assessment  Patient Currently in Pain: Yes  Pain Assessment: Faces  Pierre-Baker Pain Rating: Hurts a little bit  Pain Level: 0  Pain Type: Surgical pain  Pain Location: Hip  Pain Orientation: Right  Pain Descriptors: Aching  Oxygen Therapy  SpO2: 95

## 2017-11-02 NOTE — CONSULTS
Inpatient consult to General Surgery  Consult performed by: Mack Roa ordered by: Joanne Og  Assessment/Recommendations: Department of General Surgery - Adult  Surgical Service - General  Attending Consult Note      Reason for Consult:  Ischial pressure sore, possible debridement  Requesting Physician:  YASMIN Romero    CHIEF COMPLAINT:  Pressure sores    History Obtained From:  patient, electronic medical record    HISTORY OF PRESENT ILLNESS:                The patient is a 69 y.o. female who presents with right hip fractures on 10/29/2017. She was found down by her friend. It was unknown exactly how long she had been down. She apparently had fallen but the exact time was unknown. She underwent ORIF of right hip fracture on 10/31/2017. She was found to have ischial and sacral pressure sores of unknown duration. Surgery was consulted for possible debridement. Past Medical History:       10/30/2017: FRANCIA (acute kidney injury) ([facility] [address]  No date:  Allergic rhinitis  12/9/14: Asthma      Comment: Dr. Dhiraj Arzate  No date: Cerebral artery occlusion  No date: Cerebral artery occlusion with cerebral infarc*  No date: Chronic back pain  No date: COPD (chronic obstructive pulmonary disease) (*  No date: Depression  No date: Hyperlipidemia  No date: Hypertension  No date: Hyponatremia  No date: Osteoarthritis  No date: Osteoporosis  No date: Pneumonia  Past Surgical History:       No date: ANKLE FRACTURE SURGERY Right  10/07/2016: COLONOSCOPY      Comment: Dr. Uriel Eckert  No date: CYST REMOVAL Left      Comment: wrist  01/05/2016: EYE SURGERY Left      Comment: film removal on cataract  10/31/2017: FEMUR FRACTURE SURGERY Right      Comment: TFN SHORT NAIL performed by Amber Lynch MD at [address] [place] [address]  No date: HYSTERECTOMY  10/7/2016: [place] COLONOSCOPY FLX DX W/COLLJ SPEC WHEN PFRMD N/A      Comment: Dr Kurtis Langston due to extensive                tortuosity and diverticulosis-BE normal, recall               to be determined in office  No date: TUBAL LIGATION  Current Medications:   Current Facility-Administered Medications: 0.9 % sodium chloride bolus, 250 mL, Intravenous, Once  HYDROcodone-acetaminophen (NORCO)  MG per tablet 1 tablet, 1 tablet, Oral, Q6H PRN  bacitracin zinc ointment, , Topical, BID  linezolid (ZYVOX) IVPB 600 mg, 600 mg, Intravenous, Q12H  metoprolol tartrate (LOPRESSOR) tablet 25 mg, 25 mg, Oral, Daily  sodium chloride flush 0.9 % injection 10 mL, 10 mL, Intravenous, 2 times per day  sodium chloride flush 0.9 % injection 10 mL, 10 mL, Intravenous, PRN  docusate sodium (COLACE) capsule 100 mg, 100 mg, Oral, BID  aspirin EC tablet 81 mg, 81 mg, Oral, BID  (MAR Hold) diphenhydrAMINE (BENADRYL) injection 25 mg, 25 mg, Intravenous, Q6H PRN  mometasone-formoterol (DULERA) 200-5 MCG/ACT inhaler 2 puff, 2 puff, Inhalation, BID  calcium carbonate tablet 600 mg, 600 mg, Oral, Daily  atorvastatin (LIPITOR) tablet 40 mg, 40 mg, Oral, Nightly  sodium chloride flush 0.9 % injection 10 mL, 10 mL, Intravenous, PRN  magnesium hydroxide (MILK OF MAGNESIA) 400 MG/5ML suspension 30 mL, 30 mL, Oral, Daily PRN  ondansetron (ZOFRAN) injection 4 mg, 4 mg, Intravenous, Q6H PRN  (MAR Hold) morphine injection 2 mg, 2 mg, Intravenous, Q2H PRN **OR** (MAR Hold) morphine injection 4 mg, 4 mg, Intravenous, Q2H PRN  ipratropium-albuterol (DUONEB) nebulizer solution 1 ampule, 1 ampule, Inhalation, Q4H WA  Allergies:  Codeine; Darvocet a500 (propoxyphene n-acetaminophen); and Morphine    Social History:   TOBACCO:  Currently smokes. Type of tobacco used:  Cigarettes. Quantity per day:  .5 pack(s). Duration of tobacco use:  20+ years. Past attempts to quit?  unknown. Smoking cessation advice provided?  not applicable. ETOH:  Current alcohol usage:  Type of Drink(s): [place] Frequency of use: Shel[place] Setting Duration of alcohol use: Shelvy Setting Approximate date of last drink:   .   Family History:   Review of 55                  11/02/2017                 GLUCOSE                  172                 11/02/2017              IMPRESSION/RECOMMENDATIONS:      Patient is a 68 yo female with unstageable pressure sores over her bilateral ischium and sacrum. At this time, there is no evidence of skin breakdown or formation of eschar, and there is no evidence of infection of the pressure sores. She does not need surgical debridement at this time. After discussing with the wound care nurse, we will try Renetta and Jay Shipley and reassess the wound daily. I will follow along with you. Thank you for the consult.

## 2017-11-02 NOTE — PROGRESS NOTES
Comment  Comments: Pt. had a blood transfusion this AM. sx: 10/31/17 R hip ORIF  Subjective  Subjective: Pt. states she is weak. Pain Screening  Patient Currently in Pain: Denies  Oxygen Therapy  SpO2: 95 % (after PT)  O2 Device: Nasal cannula  O2 Flow Rate (L/min): 3 L/min  Patient Observation  Observations: pt. with IV, O2, fernandez  Pre Treatment Pain Screening  Intervention List: Patient able to continue with treatment    Orientation  Orientation  Overall Orientation Status: Impaired  Orientation Level: Oriented to person;Oriented to situation;Oriented to place    Social/Functional History  Social/Functional History  Lives With: Alone  Type of Home: House  Home Layout: One level  Home Equipment:  (none)  Receives Help From: Family  ADL Assistance: Independent  Ambulation Assistance: Independent  Transfer Assistance: Independent  Objective     Observation/Palpation  Posture: Fair  Observation: pt. very frail and B shoulders forward    AROM RLE (degrees)  RLE General AROM: AAROM knee and hip limited due to pain, ankle WFLs  AROM LLE (degrees)  LLE AROM : WFL           Bed mobility  Supine to Sit: Maximum assistance (x1-2 with draw pad)  Sit to Supine: Maximum assistance (x2A)  Scooting: Dependent/Total  Comment: pt. sat on EOB x 5 mins CGA  Transfers  Sit to Stand: Moderate Assistance (x2A with RW)  Stand to sit: Moderate Assistance (x2A)  Comment: Pt. unable to maintain TTWB  Ambulation  Ambulation?: No  WB Status: TTWB RLE     Balance  Posture: Fair  Sitting - Static: Fair  Sitting - Dynamic: Fair;-  Standing - Static: Poor  Standing - Dynamic: Poor;-        Assessment   Body structures, Functions, Activity limitations: Decreased functional mobility ; Decreased ROM; Decreased strength;Decreased safe awareness;Decreased endurance;Decreased balance  Assessment: Pt. will benefit from skilled PT to decrease impairments. Pt. a fall risk and should not attempt mobility on her own at this time.  Anticipate pt. will not be

## 2017-11-02 NOTE — PROGRESS NOTES
Patient/staff will follow swallow safety recommendations to decrease risk of penetration/aspiration during PO intake. Goal 3: Patient will complete oral motor, lingual, and pharyngeal strengthening exercises, to promote improved oral and pharyngeal stages of swallowing, with 70% of opportunities. Goal 4: Re-assessment of swallowing function for potential diet upgrade. General  Chart Reviewed: Yes  Behavior/Cognition: Alert; Requires cueing  O2 Device: Nasal cannula  Dentition: Adequate  Consistencies Trialed: Puree;Mechanical soft; Honey - cup;Nectar - cup    Observed patient's swallowing function with the following observations noted:    Oral Phase Dysfunction  Oral Phase: Exceptions  Oral Phase Dysfunction  Impaired Mastication: Mechanical soft (Patient exhibited PROLONGED oral prep with decreased rotary and vertical jaw movement noted during mechanical soft consistency trials (with added gravy texture) presented by SLP.)  Decreased Anterior to Posterior Transit: Mechanical soft  Suspected Premature Bolus Loss: Mechanical soft; Nectar - cup (Patient exhibited slow oral transit of mechanical soft consistency trials. Patient exhibited inconsistently fast oral transit of nectar thick H2O trials presented via cup by SLP.)  Lingual/Palatal Residue: Mechanical soft (Min-moderate oral cavity residue was noted post swallows that was slow to clear with additional swallows.)  Oral Phase - Comment: Patient exhibited functional oral transit of puree consistency presentations, administered by SLP, with timing primarily measuring 1-2 seconds in length and with no oral cavity residue noted post swallows. Oral transit of honey thick liquid presentations, administered via cup by SLP, still primarily measured 1 second in length. Indicators of Pharyngeal Phase Dysfunction  Pharyngeal Phase: Exceptions  Indicators of Pharyngeal Phase Dysfunction  Delayed Swallow: Mechanical soft; Nectar - cup (Suspect secondary to oral transit times.)  Decreased Laryngeal Elevation: All (Laryngeal elevation was still considered to be moderately decreased for swallow airway protection.)  Throat Clearing - Delayed: Nectar - cup (Delayed throat clears were frequently noted following nectar thick H2O trials.)  Pharyngeal: No outward S/S penetration/aspiration was noted with any puree consistency presentation, mechanical soft consistency trial, or honey thick liquid presentation. As previously mentioned, patient exhibited S/S penetration/aspiration with nectar thick H2O trials with frequent, delayed throat clears observed. At this time, would recommend continuation current diet. Meds crushed in puree. TOTAL FEED.      Electronically signed by PHIL Verdugo on 11/2/2017 at 4:10 PM

## 2017-11-02 NOTE — PROGRESS NOTES
Subjective:     Post-Operative Day: 2 Patient appears fatigued but has improved mentation. She is able to communicate better and follow some commands. She is still tend about her hip this AM. Has been seen by wound care and is being turned Q2H per nursing staff. Denies sensorineural deficits at this time. Objective:     Patient Vitals for the past 24 hrs:   BP Temp Temp src Pulse Resp SpO2 Weight   11/02/17 0715 126/82 97.4 °F (36.3 °C) Temporal 86 17 99 % -   11/02/17 0631 129/86 97.3 °F (36.3 °C) Temporal 84 17 98 % -   11/02/17 0516 - - - - - - 114 lb 9.6 oz (52 kg)   11/02/17 0430 113/75 97.8 °F (36.6 °C) Temporal 88 16 96 % -   11/02/17 0414 106/80 96.8 °F (36 °C) Temporal 86 16 97 % -   11/01/17 2321 92/68 98.5 °F (36.9 °C) Temporal 98 16 96 % -   11/01/17 2130 90/68 - - 96 16 93 % -   11/01/17 1954 (!) 80/52 99.3 °F (37.4 °C) Temporal 96 17 98 % -   11/01/17 1521 116/60 98.3 °F (36.8 °C) Temporal 105 14 95 % -   11/01/17 1114 (!) 149/86 98 °F (36.7 °C) Temporal 105 15 100 % -       General: Alert and oriented x2, appears older than stated age. Wound: Wound clean, dry and intact some bruising noted this AM. I observed mepilex over the sacral wound this morning with no drainage/soaking. Neurovascular: Exam normal   DVT Exam: No evidence of DVT seen on physical exam.  Negative Karon's sign. No cords or calf tenderness. No significant calf/ankle edema. Data Review:  Recent Labs      11/01/17 2118 11/02/17 0211   HGB  6.8*  6.8*     Recent Labs      11/02/17 0211   NA  144   K  3.6   CREATININE  1.0*         Assessment:     Status Post right TFN placement of intertroch fracture. Difficulty swallowing. Inadequate nutrition. Coccygeal pressure ulcer, right ankle lateral maleolus pressure ulcer. Acute postoperative blood loss anemia being monitored with daily hemoglobin/hematocrit. Plan:     Pain control  PT/OT - toe touch weight bearing.    DVT prophylaxis  Ice and elevate  Being followed

## 2017-11-02 NOTE — PROGRESS NOTES
Wound Care  Wound care discussed with Azalia Cochran re: surgical consult re: ischial pressure injuries that may need debridement.

## 2017-11-02 NOTE — PROGRESS NOTES
Low BP improving with IVF bolus. Hb 6.8, will transfuse 2 units of PRBC. Patient somnolent, not distressed. Domo Moon M.D.   Hospitalist service  11/2/2017  12:05 AM

## 2017-11-02 NOTE — CARE COORDINATION
Pt and family members are requesting Pt be listed with [address] [place] [address] for rehab placement.  SW has notified the facility of the referral. Awaiting approval/denial.  [address] [place] [address]  [phone]  Electronically signed by Aimee Marie on 11/2/2017 at 2:11 PM

## 2017-11-03 ENCOUNTER — APPOINTMENT (OUTPATIENT)
Dept: GENERAL RADIOLOGY | Age: 67
DRG: 956 | End: 2017-11-03
Payer: MEDICARE

## 2017-11-03 PROBLEM — L89.310: Status: ACTIVE | Noted: 2017-11-03

## 2017-11-03 PROBLEM — D62 POSTOPERATIVE ANEMIA DUE TO ACUTE BLOOD LOSS: Status: ACTIVE | Noted: 2017-11-03

## 2017-11-03 PROBLEM — L89.320: Status: ACTIVE | Noted: 2017-11-03

## 2017-11-03 LAB
ANION GAP SERPL CALCULATED.3IONS-SCNC: 10 MMOL/L (ref 7–19)
BASOPHILS ABSOLUTE: 0 K/UL (ref 0–0.2)
BASOPHILS RELATIVE PERCENT: 0.2 % (ref 0–1)
BUN BLDV-MCNC: 20 MG/DL (ref 8–23)
CALCIUM SERPL-MCNC: 8 MG/DL (ref 8.8–10.2)
CHLORIDE BLD-SCNC: 105 MMOL/L (ref 98–111)
CO2: 29 MMOL/L (ref 22–29)
CREAT SERPL-MCNC: 0.8 MG/DL (ref 0.5–0.9)
CULTURE, BLOOD 2: NORMAL
EOSINOPHILS ABSOLUTE: 0.1 K/UL (ref 0–0.6)
EOSINOPHILS RELATIVE PERCENT: 0.7 % (ref 0–5)
GFR NON-AFRICAN AMERICAN: >60
GLUCOSE BLD-MCNC: 110 MG/DL (ref 74–109)
HCT VFR BLD CALC: 37.7 % (ref 37–47)
HEMOGLOBIN: 12.4 G/DL (ref 12–16)
LYMPHOCYTES ABSOLUTE: 0.8 K/UL (ref 1.1–4.5)
LYMPHOCYTES RELATIVE PERCENT: 4.3 % (ref 20–40)
MCH RBC QN AUTO: 30.7 PG (ref 27–31)
MCHC RBC AUTO-ENTMCNC: 32.9 G/DL (ref 33–37)
MCV RBC AUTO: 93.3 FL (ref 81–99)
MONOCYTES ABSOLUTE: 1.1 K/UL (ref 0–0.9)
MONOCYTES RELATIVE PERCENT: 6.1 % (ref 0–10)
NEUTROPHILS ABSOLUTE: 15.6 K/UL (ref 1.5–7.5)
NEUTROPHILS RELATIVE PERCENT: 87.1 % (ref 50–65)
PDW BLD-RTO: 14.9 % (ref 11.5–14.5)
PLATELET # BLD: 249 K/UL (ref 130–400)
PMV BLD AUTO: 10 FL (ref 9.4–12.3)
POTASSIUM SERPL-SCNC: 3.5 MMOL/L (ref 3.5–5)
RBC # BLD: 4.04 M/UL (ref 4.2–5.4)
SODIUM BLD-SCNC: 144 MMOL/L (ref 136–145)
TOTAL CK: 489 U/L (ref 26–192)
WBC # BLD: 17.9 K/UL (ref 4.8–10.8)

## 2017-11-03 PROCEDURE — 6370000000 HC RX 637 (ALT 250 FOR IP): Performed by: INTERNAL MEDICINE

## 2017-11-03 PROCEDURE — 94640 AIRWAY INHALATION TREATMENT: CPT

## 2017-11-03 PROCEDURE — 71020 XR CHEST STANDARD TWO VW: CPT

## 2017-11-03 PROCEDURE — 6360000002 HC RX W HCPCS: Performed by: HOSPITALIST

## 2017-11-03 PROCEDURE — 36415 COLL VENOUS BLD VENIPUNCTURE: CPT

## 2017-11-03 PROCEDURE — 1210000000 HC MED SURG R&B

## 2017-11-03 PROCEDURE — 99233 SBSQ HOSP IP/OBS HIGH 50: CPT | Performed by: HOSPITALIST

## 2017-11-03 PROCEDURE — 2580000003 HC RX 258: Performed by: ORTHOPAEDIC SURGERY

## 2017-11-03 PROCEDURE — 80048 BASIC METABOLIC PNL TOTAL CA: CPT

## 2017-11-03 PROCEDURE — 6370000000 HC RX 637 (ALT 250 FOR IP): Performed by: HOSPITALIST

## 2017-11-03 PROCEDURE — 2700000000 HC OXYGEN THERAPY PER DAY

## 2017-11-03 PROCEDURE — 6370000000 HC RX 637 (ALT 250 FOR IP): Performed by: FAMILY MEDICINE

## 2017-11-03 PROCEDURE — 97530 THERAPEUTIC ACTIVITIES: CPT

## 2017-11-03 PROCEDURE — 82550 ASSAY OF CK (CPK): CPT

## 2017-11-03 PROCEDURE — 97116 GAIT TRAINING THERAPY: CPT

## 2017-11-03 PROCEDURE — 99232 SBSQ HOSP IP/OBS MODERATE 35: CPT | Performed by: FAMILY MEDICINE

## 2017-11-03 PROCEDURE — 6370000000 HC RX 637 (ALT 250 FOR IP): Performed by: ORTHOPAEDIC SURGERY

## 2017-11-03 PROCEDURE — 85025 COMPLETE CBC W/AUTO DIFF WBC: CPT

## 2017-11-03 RX ORDER — PANTOPRAZOLE SODIUM 40 MG/1
40 TABLET, DELAYED RELEASE ORAL
Status: DISCONTINUED | OUTPATIENT
Start: 2017-11-04 | End: 2017-11-04 | Stop reason: HOSPADM

## 2017-11-03 RX ORDER — FLUTICASONE PROPIONATE 50 MCG
1 SPRAY, SUSPENSION (ML) NASAL DAILY
Status: DISCONTINUED | OUTPATIENT
Start: 2017-11-03 | End: 2017-11-04 | Stop reason: HOSPADM

## 2017-11-03 RX ADMIN — MOMETASONE FUROATE AND FORMOTEROL FUMARATE DIHYDRATE 2 PUFF: 200; 5 AEROSOL RESPIRATORY (INHALATION) at 08:53

## 2017-11-03 RX ADMIN — ATORVASTATIN CALCIUM 40 MG: 20 TABLET, FILM COATED ORAL at 21:47

## 2017-11-03 RX ADMIN — Medication 600 MG: at 08:52

## 2017-11-03 RX ADMIN — METOPROLOL TARTRATE 25 MG: 25 TABLET ORAL at 08:52

## 2017-11-03 RX ADMIN — Medication 10 ML: at 09:11

## 2017-11-03 RX ADMIN — MOMETASONE FUROATE AND FORMOTEROL FUMARATE DIHYDRATE 2 PUFF: 200; 5 AEROSOL RESPIRATORY (INHALATION) at 21:48

## 2017-11-03 RX ADMIN — ENOXAPARIN SODIUM 30 MG: 100 INJECTION SUBCUTANEOUS at 15:32

## 2017-11-03 RX ADMIN — FLUTICASONE PROPIONATE 1 SPRAY: 50 SPRAY, METERED NASAL at 15:32

## 2017-11-03 RX ADMIN — BACITRACIN ZINC: 500 OINTMENT TOPICAL at 08:53

## 2017-11-03 RX ADMIN — IPRATROPIUM BROMIDE AND ALBUTEROL SULFATE 1 AMPULE: .5; 3 SOLUTION RESPIRATORY (INHALATION) at 20:13

## 2017-11-03 RX ADMIN — IPRATROPIUM BROMIDE AND ALBUTEROL SULFATE 1 AMPULE: .5; 3 SOLUTION RESPIRATORY (INHALATION) at 10:16

## 2017-11-03 RX ADMIN — DAKIN'S SOLUTION 0.125% (QUARTER STRENGTH): 0.12 SOLUTION at 00:42

## 2017-11-03 RX ADMIN — COLLAGENASE SANTYL: 250 OINTMENT TOPICAL at 08:52

## 2017-11-03 RX ADMIN — ASPIRIN 81 MG: 81 TABLET, COATED ORAL at 21:47

## 2017-11-03 RX ADMIN — DOCUSATE SODIUM 100 MG: 100 CAPSULE, LIQUID FILLED ORAL at 21:47

## 2017-11-03 RX ADMIN — DOCUSATE SODIUM 100 MG: 100 CAPSULE, LIQUID FILLED ORAL at 08:52

## 2017-11-03 RX ADMIN — Medication 10 ML: at 22:37

## 2017-11-03 RX ADMIN — HYDROCODONE BITARTRATE AND ACETAMINOPHEN 1 TABLET: 10; 325 TABLET ORAL at 18:14

## 2017-11-03 RX ADMIN — DAKIN'S SOLUTION 0.125% (QUARTER STRENGTH): 0.12 SOLUTION at 08:53

## 2017-11-03 RX ADMIN — BACITRACIN ZINC: 500 OINTMENT TOPICAL at 21:48

## 2017-11-03 RX ADMIN — IPRATROPIUM BROMIDE AND ALBUTEROL SULFATE 1 AMPULE: .5; 3 SOLUTION RESPIRATORY (INHALATION) at 06:15

## 2017-11-03 RX ADMIN — ASPIRIN 81 MG: 81 TABLET, COATED ORAL at 08:52

## 2017-11-03 RX ADMIN — LINEZOLID 600 MG: 600 INJECTION, SOLUTION INTRAVENOUS at 00:42

## 2017-11-03 RX ADMIN — LINEZOLID 600 MG: 600 INJECTION, SOLUTION INTRAVENOUS at 11:23

## 2017-11-03 ASSESSMENT — PAIN SCALES - GENERAL: PAINLEVEL_OUTOF10: 10

## 2017-11-03 NOTE — PLAN OF CARE
Problem: Nutrition  Goal: Optimal nutrition therapy  Outcome: Ongoing  Nutrition Problem: Inadequate oral intake  Intervention: Food and/or Nutrient Delivery: Continue current diet, Continue current ONS  Nutritional Goals: Pt will tolerate diet advancement. Intakes 50% or more of meals and ONS. No weight loss.

## 2017-11-03 NOTE — CARE COORDINATION
Pt has been accepted at [address] [place] [address]. Pt is able to DC to the facility on Friday November 3rd.    [address]  Electronically signed by Maria Luisa Bentley on 11/3/2017 at 11:20 AM

## 2017-11-03 NOTE — CARE COORDINATION
[address] Transitions Interview     11/3/2017    Patient: Princess Davis Patient : 1950   MRN: [id number]  Reason for Admission: There are no discharge diagnoses documented for the most recent discharge. RARS: [facility] Risk Score: 14.75       Spoke with: Princess Davis, cinthya, Jeanne Cruz and Frank Dyer      Readmission Risk  Patient Active Problem List   Diagnosis    Chronic back pain    Osteoarthritis    Depression    Hyperlipidemia    Anxiety    Osteoporosis    Cerebral artery occlusion    Hypertension    Hyponatremia    Asthma with COPD (chronic obstructive pulmonary disease) ([address]    Cachexia ([address]    Essential hypertension    Heme positive stool    Tortuous colon    Diverticulosis of large intestine without hemorrhage    Seasonal allergic rhinitis    Hypertension    COPD (chronic obstructive pulmonary disease) (HCC)    Closed right hip fracture, initial encounter ([address]    Rhabdomyolysis    Fall    Hypernatremia    Gram-positive bacteremia    FRANCIA (acute kidney injury) ([address]    Metabolic encephalopathy    Chronic obstructive pulmonary disease (HCC)    Traumatic rhabdomyolysis (HCC)    Postoperative anemia due to acute blood loss    Pressure sore of left ischium, unstageable (HCC)    Right ischial pressure sore, unstageable ([address]       Inpatient Assessment : Spoke with patient and cinthya regarding Care Transitions Coordination and process. All agreeable with process as applicable. It is probable that pt will be discharge to SNF when she leaves here for rehab, but in the even that she is not, will follow up as indicated. Care Transitions Summary    Care Transitions Inpatient Review  Medication Review  Are you able to afford your medications?:  Yes  How often do you have difficulty taking your medications?:  I always take them as prescribed.   Housing Review  Who do you live with?:  Alone  Are you an active caregiver in your home?:  No  Social Support  Do

## 2017-11-03 NOTE — PROGRESS NOTES
Physical Therapy  Alissa Player  [id number]     11/03/17 1524   Subjective   Subjective Agrees to work with therapy this afternoon. Bed Mobility   Supine to Sit Moderate assistance   Sit to Supine Moderate assistance   Transfers   Sit to Stand Moderate Assistance   Stand to sit Moderate Assistance   Ambulation   Ambulation? No   WB Status TTWB RLE   Other Activities   Comment Worked with patient on sitting balance ex's on EOB. Patient able to stand 3x EOB and pivot on L LE toward HOB. Patient returned to supine, positioned for comfort with all needs in reach. Short term goals   Time Frame for Short term goals 2 wks   Short term goal 1 supine to sit SBA   Short term goal 2 sit to stand CGA   Short term goal 3 bed to chair TTWB RLE with RW   Short term goal 4 amb. 100' with RW CGA   Activity Tolerance   Activity Tolerance Patient Tolerated treatment well;Patient limited by endurance   Safety Devices   Type of devices Bed alarm in place;Call light within reach; Left in bed   Electronically signed by Kathy Sheth PTA on 11/3/2017 at 3:26 PM

## 2017-11-03 NOTE — PROGRESS NOTES
[facility]      Patient:    Essie Sylvester  YOB: 1950  Date of Service:  11/3/2017  MRN:    [id number]    Room:   [address] [place] [address]   PCP:    Valerie Bonilla MD  Advance Directive:  Full Code  Admit Date:   10/29/2017       Hospital Day:  5    Chief Complaint:  Fall    Subjective:  Patient complains of some mild hip pain this morning, rating it as 2/10. Improved with pain medications. No other complaints. Dyspnea improved today. [address] HISTORY: The patient is a 69 y.o. female who was brought in by EMS after being found down by friend. History supplemented from chart review as pt unable to give reliable history.   She was unable to describe how she ended up on floor or how long she had been there. Pt did mention some RLE pain when she was moving in bed. Denied any CP, SOB, abd pain, urinary symptoms.     On 10/31/17, patient underwent right hip fracture open reduction and internal fixation with delano cephalomedullary nail. She was noted to have wounds bilateral hip and coccyx. Wound care is consulted and recommended santyl to wound beds daily, cover with dakins 1/4 strength solution, then covered with dry gauze and secured with medipore tape. Review of Systems:   Constitutional / general:  Denies fever / chills / sweats  Head:  Denies headache / neck stiffness / trauma / visual change  Eyes:  Denies blurry vision / acute visual change or loss / itching / redness  ENT: Denies sore throat / hoarseness / nasal drainage / ear pain  CV:  Denies chest pain / palpitations/ orthopnea   Respiratory:  Denies cough / shortness of breath / sputum / hemoptysis  GI: Denies nausea / vomiting / abdominal pain / diarrhea / constipation  :  Denies dysuria / hesitancy / urgency / hematuria   Neuro: Denies paralysis / syncope / seizure / dysphagia / headache / paresthesias  Musculoskeletal:  + Hip pain.   Denies muscle weakness / joint stiffness   Vascular: Denies edema / claudication /  collagenase   Topical Daily    bacitracin zinc   Topical BID    metoprolol tartrate  25 mg Oral Daily    sodium chloride flush  10 mL Intravenous 2 times per day    docusate sodium  100 mg Oral BID    aspirin  81 mg Oral BID    mometasone-formoterol  2 puff Inhalation BID    calcium carbonate  600 mg Oral Daily    atorvastatin  40 mg Oral Nightly    ipratropium-albuterol  1 ampule Inhalation Q4H WA     sodium chloride, HYDROcodone-acetaminophen, sodium chloride flush, [MAR Hold] diphenhydrAMINE, sodium chloride flush, magnesium hydroxide, ondansetron, [MAR Hold] morphine **OR** [MAR Hold] morphine  DIET DYSPHAGIA I PUREED; Honey Thick  Dietary Nutrition Supplements: Standard High Calorie Oral Supplement, Frozen Oral Supplement     Lab and other Data:     Recent Labs      11/01/17 2118 11/02/17 0211 11/03/17   0241   WBC  15.3*  17.1*  17.9*   HGB  6.8*  6.8*  12.4   PLT  227  250  249     Recent Labs      11/01/17 2118 11/02/17 0211 11/03/17   0241   NA  146*  144  144   K  3.7  3.6  3.5   CL  111  109  105   CO2  24  23  29   BUN  29*  25*  20   CREATININE  0.9  1.0*  0.8   GLUCOSE  163*  172*  110*       ABGs:   Lab Results   Component Value Date    PHART 7.230 10/29/2017    PO2ART 98.0 10/29/2017    WDY8CYZ 42.0 10/29/2017       Micro:   Blood culture - 10/29/17 - Strep viridans, aerobic bottle of one set only. 2nd set negative. Blood culture - 11/2/17 - In progress. Urine Culture - 10/29/17 - Mixed skin ramos.     Problem List:     Patient Active Problem List    Diagnosis Date Noted    Postoperative anemia due to acute blood loss 11/03/2017    Pressure sore of left ischium, unstageable ([facility] 11/03/2017    Right ischial pressure sore, unstageable (HCC) 11/03/2017    Chronic obstructive pulmonary disease (HCC)     Traumatic rhabdomyolysis (HCC)     Hypernatremia 10/30/2017    Gram-positive bacteremia 10/30/2017    FRANCIA (acute kidney injury) ([facility] 27/33/7306    Metabolic encephalopathy 10/30/2017    Closed right hip fracture, initial encounter ([facility] 10/29/2017    Rhabdomyolysis 10/29/2017    Fall 10/29/2017    Hypertension     COPD [facility]chronic obstructive pulmonary disease) ([facility]     Seasonal allergic rhinitis 04/21/2017    Tortuous colon 10/27/2016    Diverticulosis of large intestine without hemorrhage 10/27/2016    Heme positive stool 08/25/2016    Essential hypertension 11/11/2015    Cachexia ([facility] 09/11/2015    Asthma with COPD (chronic obstructive pulmonary disease) ([address] 12/11/2014    Chronic back pain     Osteoarthritis     Depression     Hyperlipidemia     Anxiety     Osteoporosis     Cerebral artery occlusion     Hypertension     Hyponatremia        Assessment and Plan:     Principal Problem:    Closed right hip fracture, initial encounter ([facility] - S/P ORIF with CMN on 10/31/17. Active Problems:    Hypertension    COPD (chronic obstructive pulmonary disease) ([facility]) - Continue duonebs, incentive spirometry. Rhabdomyolysis - Improved. CK on admission 1423. CK 11/3/17 489. Fall - PT/OT. Fall precautions. Hypernatremia - Resolved. Gram-positive bacteremia - likely contaminant. Repeat cultures in progress. FRANCIA (acute kidney injury) ([facility] - Resolved. Metabolic encephalopathy - Improving. Postoperative anemia secondary to acute blood loss - Transfused 2 units of PRBCs on 11/2/17. Pressure sores, bilateral ischium, unstageable - Continue wound care, specialty bed. Family requesting placement. Discussed with SW - in progress. Continue wound care. Follow labs. Antibiotic:  Zyvox   DVT Prophylaxis:  Lovenox held secondary to acute blood loss anemia requiring transfusion    Oscar Curtis PA-C  11/3/2017     I have independently interviewed and examined the patient. I have discussed key elements of the care plan with the PA or APRN & agree with the findings and care plan as documented above.   Christina Kirk MD    CC: Nose stopped up and dry  S: Trouble swallowing as her nose is stopped up, uses flonase at home, O2 is drying her nose. Family concerned whether or not she has had a stroke - was found down and nobody knows how long, trouble swallowing has been part of her problem from the time she was found down, she has been on a special diet. CT was (-) for CVA, their concern is swallowing and speech abnormality. O:Vitals: /78   Pulse 95   Temp 97.2 °F (36.2 °C) (Temporal)   Resp 16   Ht 4' 2\" (1.27 m)   Wt 114 lb 9.6 oz (52 kg) Comment: bed weight. pt is on BR  SpO2 90%   BMI 32.23 kg/m²   24HR INTAKE/OUTPUT:      Intake/Output Summary (Last 24 hours) at 11/03/17 1340  Last data filed at 11/03/17 1235   Gross per 24 hour   Intake              955 ml   Output             3450 ml   Net            -2495 ml     General appearance: alert and cooperative with exam  HEENT: atraumatic, eyes with clear conjunctiva and normal lids, pupils and irises normal, external ears and nose are normal, lips normal. Neck without masses, lympadenopathy, bruit, thyroid normal  Lungs: no increased work of breathing, clear to auscultation bilaterally without rales, rhonchi or wheezes  Heart: regular rate and rhythm, S1, S2 normal, no murmur, click, rub or gallop  Abdomen: soft, non-tender; bowel sounds normal; no masses,  no organomegaly  Extremities: 3 extremities normal, atraumatic, no cyanosis or edema - postop changes R hip  Neurologic: No focal neurologic deficits, normal sensation, alert and oriented, affect and mood appropriate. Skin: no rashes, nodules. A/P: Stable s/p ORIF R hip 10/31/17 as outlined above, adjusted IVF for her hypernatremia (improving), monitor daily, back to [place] per family and patient preference, they believe better pain relief and less side effects. She has no radiographic evidence of CVA, is on a special diet and going to ECF once all plans made. CXR 11/2/17 with ?  Infiltrate, so I have ordered a PA and

## 2017-11-03 NOTE — PROGRESS NOTES
Nutrition Assessment    Type and Reason for Visit: Reassess    Nutrition Recommendations: Continue current POC and continue to monitor overall nutritional status. Malnutrition Assessment:  · Malnutrition Status: Meets the criteria for severe malnutrition  · Context: Acute illness or injury  · Findings of the 6 clinical characteristics of malnutrition (Minimum of 2 out of 6 clinical characteristics is required to make the diagnosis of moderate or severe Protein Calorie Malnutrition based on AND/ASPEN Guidelines):  1. Energy Intake-Less than or equal to 50%, greater than or equal to 5 days    2. Weight Loss-Unable to assess, unable to assess  3. Fat Loss-Moderate subcutaneous fat loss, Orbital, Triceps, Fat overlying the ribs  4. Muscle Loss-Severe muscle mass loss, Temples (temporalis muscle), Clavicles (pectoralis and deltoids)  5. Fluid Accumulation-No significant fluid accumulation, Extremities  6.  Strength-Not measured    Nutrition Diagnosis:   · Problem: Inadequate oral intake  · Etiology: related to Insufficient energy/nutrient consumption, Increased demand for energy/nutrients due to     Signs and symptoms:  as evidenced by Intake 0-25%, Presence of wounds    Nutrition Assessment:  · Subjective Assessment: Spoke with patient today while family was at bedside. Patient reported that the she likes the supplement that has been ordered and takes well. Family stated that she is not much of an eater, even prior to hospitalization. Patient expressed disinterest in consistency of current diet order which is Dysphagia 1 pureed with HTL. She is being followed by SLP. It was noted that her bedside table had a to go food container, and when questioned, the family stated it belonged to them. Her current ONS order is a milkshake that consists of Ensure Enlive and a magic cup that is thickened to HTL consistency. Goals for the patient inlcude diet toleration and increased po intake with no weight loss. There is no new weight to assess at this time. The patients current po intake ranges from 1 - 25% of meals. Skin noted; abrasion, skin tears and unstageable pressure injury to BL ischium noted per nursing assessment. Notes indidcated that wounds are stable at this time and appear to have slight improvement. Patient received one dose of Lasix on 11/2/2017. · Nutrition-Focused Physical Findings: \"cachectic\" per MD progress note  · Wound Type: Skin Tears, Unstageable, Pressure Ulcer  · Current Nutrition Therapies:  · Oral Diet Orders: Dysphagia 1 (Pureed), Honey Thick   · Oral Diet intake: 1-25%  · Oral Nutrition Supplement (ONS) Orders: Standard High Calorie Oral Supplement, Frozen Oral Supplement  · ONS intake: % (patient reports 100% of ONS consumed)  · Anthropometric Measures:  · Ht: 4' 2\" (127 cm)   · Current Body Wt:  (Question accuracy of most recent recorded weight of 114 )  · Admission Body Wt: 77 lb 2 oz (35 kg)  · MI Classification: BMI 18.5 - 24.9 Normal Weight    Estimated Intake vs Estimated Needs: Intake Improving    Nutrition Risk Level: High    Nutrition Interventions:   Continue current diet, Continue current ONS  Continued Inpatient Monitoring    Nutrition Evaluation:   · Evaluation: Progressing toward goals   · Goals: Pt will tolerate diet advancement. Intakes 50% or more of meals and ONS. No weight loss. · Monitoring: Meal Intake, Supplement Intake, Diet Tolerance, Skin Integrity, Wound Healing, Weight, Pertinent Labs, Chewing/Swallowing    See Adult Nutrition Doc Flowsheet for more detail.      Electronically signed by Aster Monroy RD, MATHEUS on 11/3/17 at 11:27 AM    Contact Number: [phone]

## 2017-11-03 NOTE — PROGRESS NOTES
Occupational Therapy  Facility/Department: [facility] SURG SERVICES  Daily Treatment Note  NAME: Koby Felix  : 1950  MRN: [id number]    Date of Service: 11/3/2017    Patient Diagnosis(es): The primary encounter diagnosis was Fall, initial encounter. Diagnoses of Traumatic rhabdomyolysis, initial encounter ([facility] Acute renal failure, unspecified acute renal failure type ([facility] Hyperkalemia, Elevated troponin, and Urinary tract infection without hematuria, site unspecified were also pertinent to this visit. has a past medical history of FRANCIA (acute kidney injury) ([facility] Allergic rhinitis; Asthma; Cerebral artery occlusion; Cerebral artery occlusion with cerebral infarction ([facility] Chronic back pain; COPD (chronic obstructive pulmonary disease) ([facility] Depression; Hyperlipidemia; Hypertension; Hyponatremia; Osteoarthritis; Osteoporosis; and Pneumonia. has a past surgical history that includes Ankle fracture surgery (Right); Tubal ligation; cyst removal (Left); eye surgery (Left, 2016); Hysterectomy; colonoscopy flx dx w/collj spec when pfrmd (N/A, 10/7/2016); Colonoscopy (10/07/2016); and Femur fracture surgery (Right, 10/31/2017). Restrictions  Restrictions/Precautions  Restrictions/Precautions: Weight Bearing, Fall Risk  Required Braces or Orthoses?: No  Lower Extremity Weight Bearing Restrictions  Right Lower Extremity Weight Bearing: Toe Touch Weight Bearing  Subjective   General  Chart Reviewed: Yes  Patient assessed for rehabilitation services?: Yes  Family / Caregiver Present: No  Diagnosis: R hip ORIF, rhabdomyolysis  General Comment  Comments: Pt. agreeable to trying to stand at EOB. Orientation     Objective             Standing Balance  Sit to stand: Moderate assistance  Bed mobility  Sit to Supine: Moderate assistance (Mod A of 2)  Transfers  Stand Step Transfers: Moderate assistance (Pt. scooted L foot with Mod A while standing to move 1-2 feet up toward [facility].)  Sit to stand:  Moderate assistance  Transfer Comments: Stood Mod A of 2 at bedside with r.w..  Cues to maintain TTWB on RLE. Assessment   Assessment: Pt. doing better keeping weight off R foot and scooting L foot up toward HOB.   Increased mobility this PM          Discharge Recommendations:        Plan   Plan  Times per week: 3-5x/week  G-Code     OutComes Score                                             Goals  Short term goals  Time Frame for Short term goals: 1 week  Short term goal 1: Mary with static standing adhearing to TTWB on RLE  Short term goal 2: Mod A with BSC tfers  Short term goal 3: Toilet tfers with Mary   Long term goals  Long term goal 1: Return to PLOF       Therapy Time   Individual Concurrent Group Co-treatment   Time In           Time Out           Minutes                   MARTINA Ramirez/L

## 2017-11-03 NOTE — PROGRESS NOTES
Occupational Therapy  Facility/Department: [facility] SURG SERVICES  Daily Treatment Note  NAME: Beatrice Huddleston  : 1950  MRN: [id number]    Date of Service: 11/3/2017    Patient Diagnosis(es): The primary encounter diagnosis was Fall, initial encounter. Diagnoses of Traumatic rhabdomyolysis, initial encounter ([facility] Acute renal failure, unspecified acute renal failure type ([facility] Hyperkalemia, Elevated troponin, and Urinary tract infection without hematuria, site unspecified were also pertinent to this visit. has a past medical history of FRANCIA (acute kidney injury) ([facility] Allergic rhinitis; Asthma; Cerebral artery occlusion; Cerebral artery occlusion with cerebral infarction ([facility] Chronic back pain; COPD [facility]ch[facility] disease) [facility] Depression; Hyperlipidemia; Hypertension; Hyponatremia; Osteoarthritis; Osteoporosis; and Pneumonia. has a past surgical history that includes Ankle fracture surgery (Right); Tubal ligation; cyst removal (Left); eye surgery (Left, 2016); Hysterectomy; colonoscopy flx dx w/collj spec when pfrmd (N/A, 10/7/2016); Colonoscopy (10/07/2016); and Femur fracture surgery (Right, 10/31/2017). Restrictions  Restrictions/Precautions  Restrictions/Precautions: Weight Bearing, Fall Risk  Required Braces or Orthoses?: No  Lower Extremity Weight Bearing Restrictions  Right Lower Extremity Weight Bearing: Toe Touch Weight Bearing  Subjective   General  Chart Reviewed: Yes  Patient assessed for rehabilitation services?: Yes  Family / Caregiver Present: No  Diagnosis: R hip ORIF, rhabdomyolysis  General Comment  Comments: Pt. willing to stand up at EOB. Orientation     Objective             Standing Balance  Sit to stand: Moderate assistance  Bed mobility  Sit to Supine: Moderate assistance (Mod A of 2)  Transfers  Sit to stand: Moderate assistance  Transfer Comments: Stood Mod A of 2 at bedside with r.w..  Cues to maintain TTWB on RLE. Assessment   Assessment: Pt. stood and even moved L foot some to simulate sps tfers          Discharge Recommendations:        Plan   Plan  Times per week: 3-5x/week  G-Code     OutComes Score                                             Goals  Short term goals  Time Frame for Short term goals: 1 week  Short term goal 1: Mary with static standing adhearing to TTWB on RLE  Short term goal 2: Mod A with BSC tfers  Short term goal 3: Toilet tfers with Mary   Long term goals  Long term goal 1: Return to PLOF       Therapy Time   Individual Concurrent Group Co-treatment   Time In           Time Out           Minutes                   Eyad Rader OTR/L

## 2017-11-03 NOTE — CARE COORDINATION
Pt has been accepted at [facility]. Pt is able to DC to the facility on Saturday November 4th.    [address]  Electronically signed by Arturo Chaves on 11/3/2017 at 3:19 PM

## 2017-11-03 NOTE — PROGRESS NOTES
mg, 25 mg, Oral, Daily  sodium chloride flush 0.9 % injection 10 mL, 10 mL, Intravenous, 2 times per day  sodium chloride flush 0.9 % injection 10 mL, 10 mL, Intravenous, PRN  docusate sodium (COLACE) capsule 100 mg, 100 mg, Oral, BID  aspirin EC tablet 81 mg, 81 mg, Oral, BID  [MAR Hold] diphenhydrAMINE (BENADRYL) injection 25 mg, 25 mg, Intravenous, Q6H PRN  mometasone-formoterol (DULERA) 200-5 MCG/ACT inhaler 2 puff, 2 puff, Inhalation, BID  calcium carbonate tablet 600 mg, 600 mg, Oral, Daily  atorvastatin (LIPITOR) tablet 40 mg, 40 mg, Oral, Nightly  sodium chloride flush 0.9 % injection 10 mL, 10 mL, Intravenous, PRN  magnesium hydroxide (MILK OF MAGNESIA) 400 MG/5ML suspension 30 mL, 30 mL, Oral, Daily PRN  ondansetron (ZOFRAN) injection 4 mg, 4 mg, Intravenous, Q6H PRN  [MAR Hold] morphine injection 2 mg, 2 mg, Intravenous, Q2H PRN **OR** [MAR Hold] morphine injection 4 mg, 4 mg, Intravenous, Q2H PRN  ipratropium-albuterol (DUONEB) nebulizer solution 1 ampule, 1 ampule, Inhalation, Q4H WA    ASSESSMENT AND PLAN    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 Continue daily Santyl and Dakins. No need for debridement for now.

## 2017-11-03 NOTE — PROGRESS NOTES
Wound Care  Follow up with patient today and Dr. Machelle Cabrera. Order received for wound photo of right and left ischium's. Patient asked son to sign the consent, she was agreeable to wound photos. Wound photos were taken and located in Epic. Dressings removed and viewed per Dr. Machelle Cabrera. Some improvement was noted with Santyl and Dakins solution dressings. Dressings replaced with Santyl ointment and Dakins 1/4 str solu moist gauze and secured with medipore tape. Assist of patient nurse Kaylee Jose. Patient is on the Green mattress. Recommend to continue the pressure ulcer prevention.

## 2017-11-04 VITALS
WEIGHT: 107.3 LBS | SYSTOLIC BLOOD PRESSURE: 91 MMHG | BODY MASS INDEX: 24.83 KG/M2 | DIASTOLIC BLOOD PRESSURE: 65 MMHG | HEART RATE: 118 BPM | TEMPERATURE: 99.6 F | HEIGHT: 55 IN | OXYGEN SATURATION: 91 % | RESPIRATION RATE: 16 BRPM

## 2017-11-04 LAB
ANION GAP SERPL CALCULATED.3IONS-SCNC: 11 MMOL/L (ref 7–19)
ANION GAP SERPL CALCULATED.3IONS-SCNC: 8 MMOL/L (ref 7–19)
BASOPHILS ABSOLUTE: 0 K/UL (ref 0–0.2)
BASOPHILS RELATIVE PERCENT: 0.1 % (ref 0–1)
BUN BLDV-MCNC: 18 MG/DL (ref 8–23)
BUN BLDV-MCNC: 18 MG/DL (ref 8–23)
CALCIUM SERPL-MCNC: 8.3 MG/DL (ref 8.8–10.2)
CALCIUM SERPL-MCNC: 8.6 MG/DL (ref 8.8–10.2)
CHLORIDE BLD-SCNC: 103 MMOL/L (ref 98–111)
CHLORIDE BLD-SCNC: 103 MMOL/L (ref 98–111)
CO2: 31 MMOL/L (ref 22–29)
CO2: 32 MMOL/L (ref 22–29)
CREAT SERPL-MCNC: 0.7 MG/DL (ref 0.5–0.9)
CREAT SERPL-MCNC: 0.7 MG/DL (ref 0.5–0.9)
EOSINOPHILS ABSOLUTE: 0.1 K/UL (ref 0–0.6)
EOSINOPHILS RELATIVE PERCENT: 0.9 % (ref 0–5)
GFR NON-AFRICAN AMERICAN: >60
GFR NON-AFRICAN AMERICAN: >60
GLUCOSE BLD-MCNC: 104 MG/DL (ref 74–109)
GLUCOSE BLD-MCNC: 136 MG/DL (ref 74–109)
HCT VFR BLD CALC: 35.8 % (ref 37–47)
HEMOGLOBIN: 11.6 G/DL (ref 12–16)
LYMPHOCYTES ABSOLUTE: 0.8 K/UL (ref 1.1–4.5)
LYMPHOCYTES RELATIVE PERCENT: 6 % (ref 20–40)
MCH RBC QN AUTO: 30.4 PG (ref 27–31)
MCHC RBC AUTO-ENTMCNC: 32.4 G/DL (ref 33–37)
MCV RBC AUTO: 93.7 FL (ref 81–99)
MONOCYTES ABSOLUTE: 1 K/UL (ref 0–0.9)
MONOCYTES RELATIVE PERCENT: 7.3 % (ref 0–10)
NEUTROPHILS ABSOLUTE: 11.5 K/UL (ref 1.5–7.5)
NEUTROPHILS RELATIVE PERCENT: 84.4 % (ref 50–65)
PDW BLD-RTO: 14.5 % (ref 11.5–14.5)
PLATELET # BLD: 225 K/UL (ref 130–400)
PMV BLD AUTO: 10 FL (ref 9.4–12.3)
POTASSIUM SERPL-SCNC: 3.3 MMOL/L (ref 3.5–5)
POTASSIUM SERPL-SCNC: 4.3 MMOL/L (ref 3.5–5)
RBC # BLD: 3.82 M/UL (ref 4.2–5.4)
SODIUM BLD-SCNC: 143 MMOL/L (ref 136–145)
SODIUM BLD-SCNC: 145 MMOL/L (ref 136–145)
TOTAL CK: 254 U/L (ref 26–192)
WBC # BLD: 13.6 K/UL (ref 4.8–10.8)

## 2017-11-04 PROCEDURE — 80048 BASIC METABOLIC PNL TOTAL CA: CPT

## 2017-11-04 PROCEDURE — 36415 COLL VENOUS BLD VENIPUNCTURE: CPT

## 2017-11-04 PROCEDURE — 85025 COMPLETE CBC W/AUTO DIFF WBC: CPT

## 2017-11-04 PROCEDURE — G8996 SWALLOW CURRENT STATUS: HCPCS

## 2017-11-04 PROCEDURE — 6370000000 HC RX 637 (ALT 250 FOR IP): Performed by: HOSPITALIST

## 2017-11-04 PROCEDURE — 94640 AIRWAY INHALATION TREATMENT: CPT

## 2017-11-04 PROCEDURE — 99239 HOSP IP/OBS DSCHRG MGMT >30: CPT | Performed by: HOSPITALIST

## 2017-11-04 PROCEDURE — 82550 ASSAY OF CK (CPK): CPT

## 2017-11-04 PROCEDURE — 92610 EVALUATE SWALLOWING FUNCTION: CPT

## 2017-11-04 PROCEDURE — 6370000000 HC RX 637 (ALT 250 FOR IP): Performed by: INTERNAL MEDICINE

## 2017-11-04 PROCEDURE — 6360000002 HC RX W HCPCS: Performed by: HOSPITALIST

## 2017-11-04 PROCEDURE — 2700000000 HC OXYGEN THERAPY PER DAY

## 2017-11-04 PROCEDURE — 6370000000 HC RX 637 (ALT 250 FOR IP): Performed by: ORTHOPAEDIC SURGERY

## 2017-11-04 PROCEDURE — G8997 SWALLOW GOAL STATUS: HCPCS

## 2017-11-04 RX ORDER — SODIUM HYPOCHLORITE 1.25 MG/ML
SOLUTION TOPICAL DAILY
Refills: 0 | DISCHARGE
Start: 2017-11-05 | End: 2018-07-13 | Stop reason: ALTCHOICE

## 2017-11-04 RX ORDER — HYDROCODONE BITARTRATE AND ACETAMINOPHEN 10; 325 MG/1; MG/1
1 TABLET ORAL EVERY 6 HOURS PRN
Qty: 10 TABLET | Refills: 0 | Status: SHIPPED | OUTPATIENT
Start: 2017-11-04 | End: 2018-01-17 | Stop reason: ALTCHOICE

## 2017-11-04 RX ORDER — PSEUDOEPHEDRINE HCL 30 MG
100 TABLET ORAL 2 TIMES DAILY
DISCHARGE
Start: 2017-11-04

## 2017-11-04 RX ORDER — FLUTICASONE PROPIONATE 50 MCG
1 SPRAY, SUSPENSION (ML) NASAL DAILY
Qty: 1 BOTTLE | Refills: 3 | DISCHARGE
Start: 2017-11-05 | End: 2018-05-15 | Stop reason: SDUPTHER

## 2017-11-04 RX ORDER — DIAPER,BRIEF,INFANT-TODD,DISP
EACH MISCELLANEOUS
Qty: 30 G | Refills: 1 | DISCHARGE
Start: 2017-11-04 | End: 2017-11-14

## 2017-11-04 RX ORDER — POTASSIUM BICARBONATE 25 MEQ/1
25 TABLET, EFFERVESCENT ORAL
Status: COMPLETED | OUTPATIENT
Start: 2017-11-04 | End: 2017-11-04

## 2017-11-04 RX ADMIN — COLLAGENASE SANTYL: 250 OINTMENT TOPICAL at 10:01

## 2017-11-04 RX ADMIN — IPRATROPIUM BROMIDE AND ALBUTEROL SULFATE 1 AMPULE: .5; 3 SOLUTION RESPIRATORY (INHALATION) at 14:08

## 2017-11-04 RX ADMIN — PANTOPRAZOLE SODIUM 40 MG: 40 TABLET, DELAYED RELEASE ORAL at 05:02

## 2017-11-04 RX ADMIN — DAKIN'S SOLUTION 0.125% (QUARTER STRENGTH): 0.12 SOLUTION at 10:02

## 2017-11-04 RX ADMIN — IPRATROPIUM BROMIDE AND ALBUTEROL SULFATE 1 AMPULE: .5; 3 SOLUTION RESPIRATORY (INHALATION) at 18:24

## 2017-11-04 RX ADMIN — POTASSIUM BICARBONATE 25 MEQ: 25 TABLET, EFFERVESCENT ORAL at 07:33

## 2017-11-04 RX ADMIN — BACITRACIN ZINC: 500 OINTMENT TOPICAL at 10:01

## 2017-11-04 RX ADMIN — IPRATROPIUM BROMIDE AND ALBUTEROL SULFATE 1 AMPULE: .5; 3 SOLUTION RESPIRATORY (INHALATION) at 06:11

## 2017-11-04 RX ADMIN — METOPROLOL TARTRATE 25 MG: 25 TABLET ORAL at 10:00

## 2017-11-04 RX ADMIN — Medication 600 MG: at 10:00

## 2017-11-04 RX ADMIN — ENOXAPARIN SODIUM 30 MG: 100 INJECTION SUBCUTANEOUS at 14:32

## 2017-11-04 RX ADMIN — ASPIRIN 81 MG: 81 TABLET, COATED ORAL at 10:00

## 2017-11-04 RX ADMIN — MOMETASONE FUROATE AND FORMOTEROL FUMARATE DIHYDRATE 2 PUFF: 200; 5 AEROSOL RESPIRATORY (INHALATION) at 07:33

## 2017-11-04 RX ADMIN — FLUTICASONE PROPIONATE 1 SPRAY: 50 SPRAY, METERED NASAL at 10:02

## 2017-11-04 RX ADMIN — DOCUSATE SODIUM 100 MG: 100 CAPSULE, LIQUID FILLED ORAL at 10:00

## 2017-11-04 RX ADMIN — POTASSIUM BICARBONATE 25 MEQ: 25 TABLET, EFFERVESCENT ORAL at 09:59

## 2017-11-04 RX ADMIN — IPRATROPIUM BROMIDE AND ALBUTEROL SULFATE 1 AMPULE: .5; 3 SOLUTION RESPIRATORY (INHALATION) at 11:38

## 2017-11-04 RX ADMIN — HYDROCODONE BITARTRATE AND ACETAMINOPHEN 1 TABLET: 10; 325 TABLET ORAL at 05:02

## 2017-11-04 ASSESSMENT — PAIN SCALES - GENERAL: PAINLEVEL_OUTOF10: 6

## 2017-11-04 NOTE — PROGRESS NOTES
dietary consistencies, effective compensatory strategies, and safe eating environment.       Recent Chest Xray/CT of Chest:   Narrative   XR CHEST STANDARD TWO VW 11/3/2017 12:30 PM   HISTORY: HC AP   COMPARISON: November 2, 2017. FINDINGS:    Hyperinflation flattening diaphragms noted consistent with COPD. Posterior pleural effusions are noted in the lateral view. . The   cardiomediastinal silhouette and pulmonary vascularity are within   normal limits. The osseous structures and surrounding soft tissues   demonstrate no acute abnormality.       Impression   1. COPD. 2. Bilateral posterior pleural effusions these are small to moderate   pleural effusions. Signed by Dr Tom Caputo on 11/3/2017 3:19 PM           Current Diet level:  Current Diet : Puree  Current Liquid Diet : Honey      Pain Assessment  Patient Currently in Pain: Yes  Pain Assessment: Faces  Pierre-Baker Pain Rating: Hurts a little bit  Pain Level: 6  Pain Type: Surgical pain  Pain Location: Hip  Pain Orientation: Right  Pain Descriptors: Aching          Impression  Oral phase of the swallow appeared within functional limits. Pt exhibited good labial seal to cup and straw, timely oral transit, good mastication and oral clearance. No oral residue or pocketing was noted following any presentation. Mild pharyngeal phase dysphagia suspected due to decreased hyolaryngeal elevation. However, her swallow initiation was timely. Clear breath sounds were noted following all presentations. No overt s/s of aspiration observed with any presentation. Recommend diet upgrade to a dysphagia II diet, mechanically altered, with thin liquids. Recommend assist feed and continue crushing her meds in puree. Dysphagia Diagnosis: Mild pharyngeal stage dysphagia  Dysphagia Outcome Severity Scale: Level 5: Mild dysphagia- Distant supervision.  May need one diet consistency restricted     Treatment Plan  Requires SLP Intervention: Yes     D/C Recommendations: SNF  Referral To:  (Speech therapy at [facility] following discharge)    Recommended Diet and Intervention  Diet Solids Recommendation: Dysphagia II Mechanically Altered  Liquid Consistency Recommendation: Thin  Recommended Form of Meds: Crushed in puree as able  Therapeutic Interventions: Patient/Family education, Diet tolerance monitoring, Therapeutic PO trials with SLP    Compensatory Swallowing Strategies  Small bites/sips  Upright as possible for all oral intake  Alternate solids and liquids  Remain upright for 30-45 minutes after meals  Assist Feed    Treatment/Goals  Short-term Goals  Timeframe for Short-term Goals: 1-2x/day for 1 wk  Goal 1: Patient will tolerate mechanical soft diet and thin liquids with min S/S penetration/aspiraiton during PO intake. Goal 2: Patient/staff will follow swallow safety recommendations to decrease risk of penetration/aspiration during PO intake. Goal 3: Re-assessment of swallowing function for potential diet upgrade. General  Chart Reviewed: Yes  Behavior/Cognition: Alert;Pleasant mood  Temperature Spikes Noted: Yes  Respiratory Status: Room air  O2 Device: None (Room air)  Communication Observation: Functional  Follows Directions: Simple  Dentition: Adequate  Patient Positioning: Upright in bed  Baseline Vocal Quality: Weak  Volitional Cough: Strong  Consistencies Trialed: Reg solid;Mechanical soft;Puree;Thin;Thin - straw   WBC: 13.6         Vision/Hearing  Hearing  Hearing: Within functional limits    Oral Motor Deficits  Oral/Motor  Oral Motor: Exceptions to WFL (Natural dentition, right palatal weakness, right lingual deviation, no labial weakness noted. Question white patches on tongue)    Oral Phase Dysfunction  Oral Phase  Oral Phase - Comment: Oral phase of the swallow appeared within functional limits. Pt exhibited good labial seal to cup and straw, timely oral transit, good mastication and oral clearance.  No oral residue or pocketing was noted following any presentation. Indicators of Pharyngeal Phase Dysfunction   Pharyngeal Phase  Pharyngeal Phase: Exceptions  Pharyngeal Phase   Pharyngeal: Mild pharyngeal phase dysphagia due to decreased hyolaryngeal elevation. Swallow initiation was timely. Clear breath sounds were noted following all presentations. No overt s/s of aspiration observed with thin any presentation    Prognosis  Prognosis  Prognosis for safe diet advancement: good  Barriers to reach goals: cognitive deficits (Pt was not oriented to time, place, or situation.)  Individuals consulted  Consulted and agree with results and recommendations: Patient;RN    Education  Patient Education Response: Needs reinforcement      G-Code  SLP G-Codes  Swallow Current Status (): At least 1 percent but less than 20 percent impaired, limited or restricted  Swallow Goal Status ():  At least 1 percent but less than 20 percent impaired, limited or restricted             PHIL Telles  11/4/2017 3:27 PM    Electronically Signed By:  Basim Mckenzie M.S., CCC-SLP  11/4/2017,3:34 PM.

## 2017-11-04 NOTE — PROGRESS NOTES
Department of General Surgery - Adult  Surgical Service   Attending Progress Note      SUBJECTIVE:  No new complaints    OBJECTIVE      Physical    VITALS:  /82   Pulse 106   Temp 98 °F (36.7 °C) (Temporal)   Resp 16   Ht 4' 2\" (1.27 m)   Wt 107 lb 4.8 oz (48.7 kg)   SpO2 90%   BMI 30.18 kg/m²   INTAKE/OUTPUT:    Intake/Output Summary (Last 24 hours) at 11/04/17 1410  Last data filed at 11/04/17 1330   Gross per 24 hour   Intake              410 ml   Output              550 ml   Net             -140 ml     CONSTITUTIONAL:  awake, alert, cooperative, no apparent distress, and appears stated age  SKIN:  Both ischial pressure sores looked stable, no infection  Data      Current Inpatient Medications    Current Facility-Administered Medications: fluticasone (FLONASE) 50 MCG/ACT nasal spray 1 spray, 1 spray, Each Nare, Daily  sodium chloride (OCEAN, BABY AYR) 0.65 % nasal spray 1 spray, 1 spray, Each Nare, Q2H PRN  enoxaparin (LOVENOX) injection 30 mg, 30 mg, Subcutaneous, Q24H  pantoprazole (PROTONIX) tablet 40 mg, 40 mg, Oral, QAM AC  HYDROcodone-acetaminophen (NORCO)  MG per tablet 1 tablet, 1 tablet, Oral, Q6H PRN  sodium hypochlorite (DAKINS) 0.125 % external solution, , Irrigation, Daily  collagenase ointment, , Topical, Daily  bacitracin zinc ointment, , Topical, BID  metoprolol tartrate (LOPRESSOR) tablet 25 mg, 25 mg, Oral, Daily  sodium chloride flush 0.9 % injection 10 mL, 10 mL, Intravenous, 2 times per day  sodium chloride flush 0.9 % injection 10 mL, 10 mL, Intravenous, PRN  docusate sodium (COLACE) capsule 100 mg, 100 mg, Oral, BID  aspirin EC tablet 81 mg, 81 mg, Oral, BID  [MAR Hold] diphenhydrAMINE (BENADRYL) injection 25 mg, 25 mg, Intravenous, Q6H PRN  mometasone-formoterol (DULERA) 200-5 MCG/ACT inhaler 2 puff, 2 puff, Inhalation, BID  calcium carbonate tablet 600 mg, 600 mg, Oral, Daily  atorvastatin (LIPITOR) tablet 40 mg, 40 mg, Oral, Nightly  sodium chloride flush 0.9 % injection 10 mL, 10 mL, Intravenous, PRN  magnesium hydroxide (MILK OF MAGNESIA) 400 MG/5ML suspension 30 mL, 30 mL, Oral, Daily PRN  ondansetron (ZOFRAN) injection 4 mg, 4 mg, Intravenous, Q6H PRN  [MAR Hold] morphine injection 2 mg, 2 mg, Intravenous, Q2H PRN **OR** [MAR Hold] morphine injection 4 mg, 4 mg, Intravenous, Q2H PRN  ipratropium-albuterol (DUONEB) nebulizer solution 1 ampule, 1 ampule, Inhalation, Q4H WA    ASSESSMENT AND PLAN    1) Ischial pressure sores stable. Continue Santyl and Dakins. Will follow.

## 2017-11-04 NOTE — DISCHARGE INSTR - DIET

## 2017-11-04 NOTE — DISCHARGE SUMMARY
[facility] Discharge Summary    Mark Díaz  :  1950  MRN:  [id number]    Admit date:  10/29/2017  Discharge date:  17    Admitting Physician:  Deedra Sever, MD    Primary Care Physician:  Mark Gotti MD    Consultants:  Dr. Femi Colbert, Orthopedic Surgery; Dr. Michelle Howard, General Surgery    Discharge Diagnoses:  Principal Problem:    Closed right hip fracture, initial encounter [facility]) - S/P ORIF with CMN on 10/31/17.     Active Problems:    Hypertension - medical management    COPD  - Continued duonebs, incentive spirometry. Rhabdomyolysis - Improved with hydration, CK on admission 1423 improved to 489 11/3/17. Fall - PT/OT. Fall precautions. Hypernatremia - Resolved wit hydration. Gram-positive bacteremia - likely contaminant. Repeat cultures no growth tod ate. FRANCIA - Resolved with hydration    Metabolic encephalopathy - Improving with hydration. Postoperative anemia secondary to acute blood loss - Transfused 2 units of PRBCs on 17. Pressure sores, bilateral ischium, unstageable - Continue wound care, specialty bed, continue fernandez for now, defer to ECF. Hospital Course:      69 y.o. female who was brought in by EMS after being found down by friend. Claudette Guzman was unable to describe how she ended up on floor or how long she had been there. Pt did mention some RLE pain when she was moving in bed. Denied any CP, SOB, abd pain, urinary symptoms. Workup in the ED revealed a right hip fracture. The patient was admitted to the hospital with orthopedic surgery consultation.      On 10/31/17, patient underwent right hip fracture open reduction and internal fixation with delano cephalomedullary nail. She tolerated the procedure well without any immediate postoperative complications. The patient was monitored closely on the medical floor. She required transfusion of 2 units of PRBCs due to acute blood loss anemia.   Her hemoglobin has since stabilized. She was noted to have leukocytosis. Initial blood cultures grew streptococcus viridans, which was suspected to be a contaminant. Repeat blood cultures remain negative. Urinalysis was unremarkable. Urine culture negative. A chest xray was also obtained, which questioned a pneumonia vs pulmonary edema. A two view chest xray was then obtained for comparison and was consistent with bilateral pleural effusions. Leukocytosis is improving. The patient is participating well with therapy. She was referred to [facility] and will be discharged to their care today. She was noted to have pressure sores. Wound care was consulted. Specialty bed was obtained. The patient was seen by Dr. Alec Buckley who has been managing her wound care with daily Santyl and Dakins. He did not feel debridement is needed at this time. The patient will be discharged with orthopedic surgery and wound care follow up recommended. Wound care follow up either at the wound care center or with provider at [facility] if there is a wound care provider available in house. Physical Examination:  VITALS:  BP (!) 157/84   Pulse 101   Temp 98.9 °F (37.2 °C) (Temporal)   Resp 16   Ht 4' 2\" (1.27 m)   Wt 107 lb 4.8 oz (48.7 kg)   SpO2 91%   BMI 30.18 kg/m²   24HR INTAKE/OUTPUT:      Intake/Output Summary (Last 24 hours) at 11/04/17 1518  Last data filed at 11/04/17 1330   Gross per 24 hour   Intake              410 ml   Output              550 ml   Net             -140 ml     Constitutional: 68 y/o female, Well-developed and well-nourished. No distress. HENT:    Head: Normocephalic and atraumatic.    Mouth/Throat: Oropharynx is clear and moist. No oropharyngeal exudate. Eyes: Conjunctivae and EOM are normal. Pupils are equal, round, and reactive to light. No scleral icterus. Neck: Normal range of motion. Neck supple. No JVD present. No tracheal deviation present. No thyromegaly present.    Cardiovascular: Normal rate, pulmonary edema. Signed by Dr Yoselin Rice on 11/2/2017 6:19 PM     Chest Xray - 11/3/17  1. COPD. 2. Bilateral posterior pleural effusions these are small to moderate   pleural effusions. Signed by Dr Mohit Marroquin on 11/3/2017 3:19 PM     Lab Results   Component Value Date     11/04/2017    K 4.3 11/04/2017     11/04/2017    CO2 31 11/04/2017    BUN 18 11/04/2017    CREATININE 0.7 11/04/2017    GLUCOSE 136 11/04/2017    CALCIUM 8.6 11/04/2017      Lab Results   Component Value Date    WBC 13.6 (H) 11/04/2017    HGB 11.6 (L) 11/04/2017    HCT 35.8 (L) 11/04/2017    MCV 93.7 11/04/2017     11/04/2017         Discharge Medications: [facility]   Home Medication Instructions UQD:[id number]    Printed on:11/04/17 4740   Medication Information                      albuterol (PROVENTIL) (2.5 MG/3ML) 0.083% nebulizer solution  Take 3 mLs by nebulization every 6 hours as needed for Wheezing or Shortness of Breath             albuterol sulfate HFA (PROAIR HFA) 108 (90 Base) MCG/ACT inhaler  Inhale 2 puffs into the lungs every 6 hours as needed for Wheezing             aspirin 81 MG tablet  Take 81 mg by mouth daily. bacitracin zinc 500 UNIT/GM ointment  Gently cleanse abrasions to BlE's with dial soap and water, pat dry Apply Bacitracin ointment Cover with mepilex dressing             budesonide-formoterol (SYMBICORT) 160-4.5 MCG/ACT AERO  INHALE 2 PUFFS INTO THE LUNGS TWICE A DAY             calcium carbonate 600 MG TABS tablet  Take 1 tablet by mouth daily. collagenase 250 UNIT/GM ointment  Apply to to left and right hip wounds, then apply moistened Dakins 1/4 str solution gauze over the santyl, cover with dry gauze and secure with minimal medipore tape. diphenhydrAMINE (BENADRYL) 25 MG tablet  Take 25 mg by mouth every 6 hours as needed for Itching.              docusate (COLACE, DULCOLAX) 100 MG CAPS  Take 100 mg by mouth 2 times daily enoxaparin (LOVENOX) 30 MG/0.3ML injection  Inject 0.3 mLs into the skin every 24 hours DVT Prophylaxis             fluticasone (FLONASE) 50 MCG/ACT nasal spray  1 spray by Nasal route daily             HYDROcodone-acetaminophen (NORCO)  MG per tablet  Take 1 tablet by mouth every 6 hours as needed for Pain .             magnesium hydroxide (MILK OF MAGNESIA) 400 MG/5ML suspension  Take 30 mLs by mouth daily as needed for Constipation             metoprolol tartrate (LOPRESSOR) 25 MG tablet  Take 25 mg by mouth daily             Multiple Vitamins-Calcium (ONE-A-DAY WOMENS PO)  Take  by mouth. simvastatin (ZOCOR) 40 MG tablet  TAKE 1 TABLET BY MOUTH EVERY DAY             sodium chloride (OCEAN, BABY AYR) 0.65 % nasal spray  1 spray by Nasal route every 2 hours as needed for Congestion             sodium hypochlorite (DAKINS) 0.125 % SOLN external solution  Apply topically daily Apply moistened gauze with Dakins 1/4 str over Santyl ointment to left and right hip wounds Cover with dry gauze and secure with medipore tape, use minimal tape to secure             SPIRIVA HANDIHALER 18 MCG inhalation capsule  INHALE THE CONTENT OF 1 CAPSULE VIA HANDIHALER EVERY DAY                 Disposition:   [facility]    Diet:  Dysphagia II Mechanically Altered    Follow Up Instructions: Follow up with Betito Carpio MD in 2-3 days. Follow up with Dr. Jaye Meier as directed. Time spent on discharge: 40 minutes. Signed:  Olive Guerra PA-C  11/4/2017     I have independently interviewed and examined the patient. I have discussed key elements of the care plan with the PA or APRN & agree with the findings and care plan as documented above. Gabriella Cast MD    CC: Feeling better  S: [facility], K replaced, she is feeling better.   O:Vitals: BP (!) 157/84   Pulse 101   Temp 98.9 °F (37.2 °C) (Temporal)   Resp 16   Ht 4' 2\" (1.27 m)   Wt 107 lb 4.8 oz (48.7 kg)   SpO2 91%   BMI 30.18 kg/m²   24HR INTAKE/OUTPUT:    Intake/Output Summary (Last 24 hours) at 11/04/17 1603  Last data filed at 11/04/17 1330   Gross per 24 hour   Intake              410 ml   Output              550 ml   Net             -140 ml     General appearance: alert and cooperative with exam  HEENT: atraumatic, eyes with clear conjunctiva and normal lids, pupils and irises normal, external ears and nose are normal, lips normal. Neck without masses, lympadenopathy, bruit, thyroid normal  Lungs: no increased work of breathing, clear to auscultation bilaterally without rales, rhonchi or wheezes  Heart: regular rate and rhythm, S1, S2 normal, no murmur, click, rub or gallop  Abdomen: soft, non-tender; bowel sounds normal; no masses,  no organomegaly  Extremities: 3 extremities normal, atraumatic, no cyanosis or edema - postop changes R hip  Neurologic: No focal neurologic deficits, normal sensation, alert and oriented, affect and mood appropriate. Skin: no rashes, nodules. A/P: Stable s/p ORIF R hip 10/31/17 as outlined above, hypernatremia resolved with IVF as did rhabdomyolysis. Resarted Norco with better pain relief and less side effects. She has no radiographic evidence of CVA, is on a special diet and going to ECF once all plans made. CXR (-) for infiltrate 11/3/17. She bled to Hg 6.8 tranfused to 12.4 / 11.6 and anticoagulants held, she has remained on aspirin. Reasonable to continue SLP, PT, OT as already planned, continue ASA. Would not use ACE-I initially as BP already low with normalized Hg, this can be started in ECF if needed. Consider Lovenox for DVT prophylaxis with PPI for GI prophylaxis, LDL was 100 8/30/16 on statin. Flonase, ocean spray, humidify O2  - all for comfort.

## 2017-11-04 NOTE — PROGRESS NOTES
Dressings on bottom changed this am due to being soiled. Pt tolerated well. Pain pill given for discomfort.

## 2017-11-06 LAB
EKG P AXIS: 83 DEGREES
EKG P-R INTERVAL: 124 MS
EKG Q-T INTERVAL: 326 MS
EKG QRS DURATION: 88 MS
EKG QTC CALCULATION (BAZETT): 401 MS
EKG T AXIS: 72 DEGREES

## 2017-11-07 LAB
BLOOD CULTURE, ROUTINE: NORMAL
CULTURE, BLOOD 2: NORMAL

## 2017-11-10 ENCOUNTER — LAB REQUISITION (OUTPATIENT)
Dept: LAB | Facility: HOSPITAL | Age: 67
End: 2017-11-10

## 2017-11-10 DIAGNOSIS — Z00.00 ENCOUNTER FOR GENERAL ADULT MEDICAL EXAMINATION WITHOUT ABNORMAL FINDINGS: ICD-10-CM

## 2017-11-10 LAB
ALBUMIN SERPL-MCNC: 2.6 G/DL (ref 3.5–5)
ALBUMIN/GLOB SERPL: 1.3 G/DL (ref 1.1–2.5)
ALP SERPL-CCNC: 75 U/L (ref 24–120)
ALT SERPL W P-5'-P-CCNC: 47 U/L (ref 0–54)
ANION GAP SERPL CALCULATED.3IONS-SCNC: 7 MMOL/L (ref 4–13)
AST SERPL-CCNC: 28 U/L (ref 7–45)
BASOPHILS # BLD AUTO: 0.01 10*3/MM3 (ref 0–0.2)
BASOPHILS NFR BLD AUTO: 0.1 % (ref 0–2)
BILIRUB SERPL-MCNC: 0.3 MG/DL (ref 0.1–1)
BUN BLD-MCNC: 17 MG/DL (ref 5–21)
BUN/CREAT SERPL: 29.8 (ref 7–25)
CALCIUM SPEC-SCNC: 8.2 MG/DL (ref 8.4–10.4)
CHLORIDE SERPL-SCNC: 99 MMOL/L (ref 98–110)
CO2 SERPL-SCNC: 38 MMOL/L (ref 24–31)
CREAT BLD-MCNC: 0.57 MG/DL (ref 0.5–1.4)
DEPRECATED RDW RBC AUTO: 46.1 FL (ref 40–54)
EOSINOPHIL # BLD AUTO: 0.1 10*3/MM3 (ref 0–0.7)
EOSINOPHIL NFR BLD AUTO: 1.3 % (ref 0–4)
ERYTHROCYTE [DISTWIDTH] IN BLOOD BY AUTOMATED COUNT: 13.9 % (ref 12–15)
GFR SERPL CREATININE-BSD FRML MDRD: 105 ML/MIN/1.73
GFR SERPL CREATININE-BSD FRML MDRD: 127 ML/MIN/1.73
GLOBULIN UR ELPH-MCNC: 2 GM/DL
GLUCOSE BLD-MCNC: 91 MG/DL (ref 70–100)
HCT VFR BLD AUTO: 30.6 % (ref 37–47)
HGB BLD-MCNC: 9.7 G/DL (ref 12–16)
LYMPHOCYTES # BLD AUTO: 0.8 10*3/MM3 (ref 0.72–4.86)
LYMPHOCYTES NFR BLD AUTO: 10.1 % (ref 15–45)
MCH RBC QN AUTO: 30.4 PG (ref 28–32)
MCHC RBC AUTO-ENTMCNC: 31.7 G/DL (ref 33–36)
MCV RBC AUTO: 95.9 FL (ref 82–98)
MONOCYTES # BLD AUTO: 0.74 10*3/MM3 (ref 0.19–1.3)
MONOCYTES NFR BLD AUTO: 9.3 % (ref 4–12)
NEUTROPHILS # BLD AUTO: 6.27 10*3/MM3 (ref 1.87–8.4)
NEUTROPHILS NFR BLD AUTO: 79.2 % (ref 39–78)
PLATELET # BLD AUTO: 336 10*3/MM3 (ref 130–400)
PMV BLD AUTO: 10.1 FL (ref 6–12)
POTASSIUM BLD-SCNC: 3.1 MMOL/L (ref 3.5–5.3)
PROT SERPL-MCNC: 4.6 G/DL (ref 6.3–8.7)
RBC # BLD AUTO: 3.19 10*6/MM3 (ref 4.2–5.4)
SODIUM BLD-SCNC: 144 MMOL/L (ref 135–145)
WBC NRBC COR # BLD: 7.92 10*3/MM3 (ref 4.8–10.8)

## 2017-11-10 PROCEDURE — 85025 COMPLETE CBC W/AUTO DIFF WBC: CPT | Performed by: NURSE PRACTITIONER

## 2017-11-10 PROCEDURE — 80053 COMPREHEN METABOLIC PANEL: CPT | Performed by: NURSE PRACTITIONER

## 2017-11-10 PROCEDURE — 36415 COLL VENOUS BLD VENIPUNCTURE: CPT | Performed by: NURSE PRACTITIONER

## 2017-11-12 PROCEDURE — 87086 URINE CULTURE/COLONY COUNT: CPT | Performed by: NURSE PRACTITIONER

## 2017-11-12 PROCEDURE — 87077 CULTURE AEROBIC IDENTIFY: CPT | Performed by: NURSE PRACTITIONER

## 2017-11-12 PROCEDURE — 87186 SC STD MICRODIL/AGAR DIL: CPT | Performed by: NURSE PRACTITIONER

## 2017-11-12 PROCEDURE — 81001 URINALYSIS AUTO W/SCOPE: CPT | Performed by: NURSE PRACTITIONER

## 2017-11-13 ENCOUNTER — LAB REQUISITION (OUTPATIENT)
Dept: LAB | Facility: HOSPITAL | Age: 67
End: 2017-11-13

## 2017-11-13 DIAGNOSIS — Z00.00 ENCOUNTER FOR GENERAL ADULT MEDICAL EXAMINATION WITHOUT ABNORMAL FINDINGS: ICD-10-CM

## 2017-11-13 LAB
BILIRUB UR QL STRIP: NEGATIVE
CLARITY UR: ABNORMAL
COLOR UR: ABNORMAL
GLUCOSE UR STRIP-MCNC: NEGATIVE MG/DL
HGB UR QL STRIP.AUTO: ABNORMAL
KETONES UR QL STRIP: NEGATIVE
LEUKOCYTE ESTERASE UR QL STRIP.AUTO: ABNORMAL
NITRITE UR QL STRIP: POSITIVE
PH UR STRIP.AUTO: 6.5 [PH] (ref 5–8)
PROT UR QL STRIP: ABNORMAL
SP GR UR STRIP: 1.02 (ref 1–1.03)
UROBILINOGEN UR QL STRIP: ABNORMAL

## 2017-11-14 LAB
BACTERIA SPEC AEROBE CULT: ABNORMAL
BACTERIA SPEC AEROBE CULT: ABNORMAL
BACTERIA UR QL AUTO: ABNORMAL /HPF
RBC # UR: ABNORMAL /HPF
REF LAB TEST METHOD: ABNORMAL
SQUAMOUS #/AREA URNS HPF: ABNORMAL /HPF
WBC UR QL AUTO: ABNORMAL /HPF

## 2017-11-17 ENCOUNTER — LAB REQUISITION (OUTPATIENT)
Dept: LAB | Facility: HOSPITAL | Age: 67
End: 2017-11-17

## 2017-11-17 DIAGNOSIS — Z00.00 ENCOUNTER FOR GENERAL ADULT MEDICAL EXAMINATION WITHOUT ABNORMAL FINDINGS: ICD-10-CM

## 2017-11-17 LAB
ANION GAP SERPL CALCULATED.3IONS-SCNC: 8 MMOL/L (ref 4–13)
ANISOCYTOSIS BLD QL: NORMAL
BASOPHILS # BLD AUTO: 0.01 10*3/MM3 (ref 0–0.2)
BASOPHILS NFR BLD AUTO: 0.1 % (ref 0–2)
BUN BLD-MCNC: 13 MG/DL (ref 5–21)
BUN/CREAT SERPL: 19.1 (ref 7–25)
CALCIUM SPEC-SCNC: 7.9 MG/DL (ref 8.4–10.4)
CHLORIDE SERPL-SCNC: 104 MMOL/L (ref 98–110)
CO2 SERPL-SCNC: 33 MMOL/L (ref 24–31)
CREAT BLD-MCNC: 0.68 MG/DL (ref 0.5–1.4)
DEPRECATED RDW RBC AUTO: 52.4 FL (ref 40–54)
EOSINOPHIL # BLD AUTO: 0.02 10*3/MM3 (ref 0–0.7)
EOSINOPHIL NFR BLD AUTO: 0.2 % (ref 0–4)
ERYTHROCYTE [DISTWIDTH] IN BLOOD BY AUTOMATED COUNT: 14.7 % (ref 12–15)
GFR SERPL CREATININE-BSD FRML MDRD: 104 ML/MIN/1.73
GFR SERPL CREATININE-BSD FRML MDRD: 86 ML/MIN/1.73
GLUCOSE BLD-MCNC: 97 MG/DL (ref 70–100)
HCT VFR BLD AUTO: 31.2 % (ref 37–47)
HGB BLD-MCNC: 9.5 G/DL (ref 12–16)
IMM GRANULOCYTES # BLD: 0.05 10*3/MM3 (ref 0–0.03)
IMM GRANULOCYTES NFR BLD: 0.4 % (ref 0–5)
LYMPHOCYTES # BLD AUTO: 1.03 10*3/MM3 (ref 0.72–4.86)
LYMPHOCYTES NFR BLD AUTO: 8.8 % (ref 15–45)
MCH RBC QN AUTO: 29.5 PG (ref 28–32)
MCHC RBC AUTO-ENTMCNC: 30.4 G/DL (ref 33–36)
MCV RBC AUTO: 96.9 FL (ref 82–98)
MONOCYTES # BLD AUTO: 0.73 10*3/MM3 (ref 0.19–1.3)
MONOCYTES NFR BLD AUTO: 6.3 % (ref 4–12)
NEUTROPHILS # BLD AUTO: 9.8 10*3/MM3 (ref 1.87–8.4)
NEUTROPHILS NFR BLD AUTO: 84.2 % (ref 39–78)
NRBC BLD MANUAL-RTO: 0 /100 WBC (ref 0–0)
PLATELET # BLD AUTO: 488 10*3/MM3 (ref 130–400)
PMV BLD AUTO: 10.1 FL (ref 6–12)
POIKILOCYTOSIS BLD QL SMEAR: NORMAL
POTASSIUM BLD-SCNC: 3.8 MMOL/L (ref 3.5–5.3)
RBC # BLD AUTO: 3.22 10*6/MM3 (ref 4.2–5.4)
SMALL PLATELETS BLD QL SMEAR: NORMAL
SODIUM BLD-SCNC: 145 MMOL/L (ref 135–145)
WBC MORPH BLD: NORMAL
WBC NRBC COR # BLD: 11.64 10*3/MM3 (ref 4.8–10.8)

## 2017-11-17 PROCEDURE — 85007 BL SMEAR W/DIFF WBC COUNT: CPT | Performed by: NURSE PRACTITIONER

## 2017-11-17 PROCEDURE — 36415 COLL VENOUS BLD VENIPUNCTURE: CPT | Performed by: NURSE PRACTITIONER

## 2017-11-17 PROCEDURE — 80048 BASIC METABOLIC PNL TOTAL CA: CPT | Performed by: NURSE PRACTITIONER

## 2017-11-17 PROCEDURE — 85025 COMPLETE CBC W/AUTO DIFF WBC: CPT | Performed by: NURSE PRACTITIONER

## 2017-11-21 ENCOUNTER — LAB REQUISITION (OUTPATIENT)
Dept: LAB | Facility: HOSPITAL | Age: 67
End: 2017-11-21

## 2017-11-21 DIAGNOSIS — Z00.00 ENCOUNTER FOR GENERAL ADULT MEDICAL EXAMINATION WITHOUT ABNORMAL FINDINGS: ICD-10-CM

## 2017-11-21 LAB
ALBUMIN SERPL-MCNC: 2.6 G/DL (ref 3.5–5)
ALBUMIN/GLOB SERPL: 0.9 G/DL (ref 1.1–2.5)
ALP SERPL-CCNC: 100 U/L (ref 24–120)
ALT SERPL W P-5'-P-CCNC: 33 U/L (ref 0–54)
ANION GAP SERPL CALCULATED.3IONS-SCNC: 5 MMOL/L (ref 4–13)
AST SERPL-CCNC: 33 U/L (ref 7–45)
BACTERIA UR QL AUTO: ABNORMAL /HPF
BASOPHILS # BLD AUTO: 0.01 10*3/MM3 (ref 0–0.2)
BASOPHILS NFR BLD AUTO: 0.2 % (ref 0–2)
BILIRUB SERPL-MCNC: 0.1 MG/DL (ref 0.1–1)
BILIRUB UR QL STRIP: NEGATIVE
BUN BLD-MCNC: 15 MG/DL (ref 5–21)
BUN/CREAT SERPL: 21.1 (ref 7–25)
CALCIUM SPEC-SCNC: 8.2 MG/DL (ref 8.4–10.4)
CHLORIDE SERPL-SCNC: 100 MMOL/L (ref 98–110)
CLARITY UR: ABNORMAL
CO2 SERPL-SCNC: 35 MMOL/L (ref 24–31)
COARSE GRAN CASTS URNS QL MICRO: ABNORMAL /LPF
COLOR UR: ABNORMAL
CREAT BLD-MCNC: 0.71 MG/DL (ref 0.5–1.4)
DEPRECATED RDW RBC AUTO: 50.7 FL (ref 40–54)
EOSINOPHIL # BLD AUTO: 0.08 10*3/MM3 (ref 0–0.7)
EOSINOPHIL NFR BLD AUTO: 1.6 % (ref 0–4)
ERYTHROCYTE [DISTWIDTH] IN BLOOD BY AUTOMATED COUNT: 14.7 % (ref 12–15)
GFR SERPL CREATININE-BSD FRML MDRD: 82 ML/MIN/1.73
GFR SERPL CREATININE-BSD FRML MDRD: 99 ML/MIN/1.73
GLOBULIN UR ELPH-MCNC: 2.9 GM/DL
GLUCOSE BLD-MCNC: 105 MG/DL (ref 70–100)
GLUCOSE UR STRIP-MCNC: NEGATIVE MG/DL
HCT VFR BLD AUTO: 27.2 % (ref 37–47)
HGB BLD-MCNC: 8.5 G/DL (ref 12–16)
HGB UR QL STRIP.AUTO: ABNORMAL
HYALINE CASTS UR QL AUTO: ABNORMAL /LPF
IMM GRANULOCYTES # BLD: 0.04 10*3/MM3 (ref 0–0.03)
IMM GRANULOCYTES NFR BLD: 0.8 % (ref 0–5)
KETONES UR QL STRIP: NEGATIVE
LEUKOCYTE ESTERASE UR QL STRIP.AUTO: ABNORMAL
LYMPHOCYTES # BLD AUTO: 1.34 10*3/MM3 (ref 0.72–4.86)
LYMPHOCYTES NFR BLD AUTO: 27 % (ref 15–45)
MCH RBC QN AUTO: 29.6 PG (ref 28–32)
MCHC RBC AUTO-ENTMCNC: 31.3 G/DL (ref 33–36)
MCV RBC AUTO: 94.8 FL (ref 82–98)
MONOCYTES # BLD AUTO: 0.43 10*3/MM3 (ref 0.19–1.3)
MONOCYTES NFR BLD AUTO: 8.7 % (ref 4–12)
MUCOUS THREADS URNS QL MICRO: ABNORMAL /HPF
NEUTROPHILS # BLD AUTO: 3.06 10*3/MM3 (ref 1.87–8.4)
NEUTROPHILS NFR BLD AUTO: 61.7 % (ref 39–78)
NITRITE UR QL STRIP: NEGATIVE
NT-PROBNP SERPL-MCNC: 1950 PG/ML (ref 0–900)
PH UR STRIP.AUTO: 8 [PH] (ref 5–8)
PLATELET # BLD AUTO: 496 10*3/MM3 (ref 130–400)
PMV BLD AUTO: 9.7 FL (ref 6–12)
POTASSIUM BLD-SCNC: 3.3 MMOL/L (ref 3.5–5.3)
PROT SERPL-MCNC: 5.5 G/DL (ref 6.3–8.7)
PROT UR QL STRIP: ABNORMAL
RBC # BLD AUTO: 2.87 10*6/MM3 (ref 4.2–5.4)
RBC # UR: ABNORMAL /HPF
REF LAB TEST METHOD: ABNORMAL
SODIUM BLD-SCNC: 140 MMOL/L (ref 135–145)
SP GR UR STRIP: 1.02 (ref 1–1.03)
SQUAMOUS #/AREA URNS HPF: ABNORMAL /HPF
UROBILINOGEN UR QL STRIP: ABNORMAL
WBC NRBC COR # BLD: 4.96 10*3/MM3 (ref 4.8–10.8)
WBC UR QL AUTO: ABNORMAL /HPF
YEAST URNS QL MICRO: ABNORMAL /HPF

## 2017-11-21 PROCEDURE — 87086 URINE CULTURE/COLONY COUNT: CPT | Performed by: NURSE PRACTITIONER

## 2017-11-21 PROCEDURE — 85025 COMPLETE CBC W/AUTO DIFF WBC: CPT | Performed by: NURSE PRACTITIONER

## 2017-11-21 PROCEDURE — 83880 ASSAY OF NATRIURETIC PEPTIDE: CPT | Performed by: NURSE PRACTITIONER

## 2017-11-21 PROCEDURE — 81001 URINALYSIS AUTO W/SCOPE: CPT | Performed by: NURSE PRACTITIONER

## 2017-11-21 PROCEDURE — 80053 COMPREHEN METABOLIC PANEL: CPT | Performed by: NURSE PRACTITIONER

## 2017-11-22 ENCOUNTER — LAB REQUISITION (OUTPATIENT)
Dept: LAB | Facility: HOSPITAL | Age: 67
End: 2017-11-22

## 2017-11-22 DIAGNOSIS — Z00.00 ENCOUNTER FOR GENERAL ADULT MEDICAL EXAMINATION WITHOUT ABNORMAL FINDINGS: ICD-10-CM

## 2017-11-22 LAB
ALBUMIN SERPL-MCNC: 2.2 G/DL (ref 3.5–5)
ALBUMIN/GLOB SERPL: 0.8 G/DL (ref 1.1–2.5)
ALP SERPL-CCNC: 81 U/L (ref 24–120)
ALT SERPL W P-5'-P-CCNC: 27 U/L (ref 0–54)
ANION GAP SERPL CALCULATED.3IONS-SCNC: 4 MMOL/L (ref 4–13)
AST SERPL-CCNC: 24 U/L (ref 7–45)
BILIRUB SERPL-MCNC: 0.3 MG/DL (ref 0.1–1)
BUN BLD-MCNC: 13 MG/DL (ref 5–21)
BUN/CREAT SERPL: 22 (ref 7–25)
CALCIUM SPEC-SCNC: 7.7 MG/DL (ref 8.4–10.4)
CHLORIDE SERPL-SCNC: 108 MMOL/L (ref 98–110)
CO2 SERPL-SCNC: 31 MMOL/L (ref 24–31)
CREAT BLD-MCNC: 0.59 MG/DL (ref 0.5–1.4)
DEPRECATED RDW RBC AUTO: 50.8 FL (ref 40–54)
EOSINOPHIL # BLD MANUAL: 0.2 10*3/MM3 (ref 0–0.7)
EOSINOPHIL NFR BLD MANUAL: 4 % (ref 0–4)
ERYTHROCYTE [DISTWIDTH] IN BLOOD BY AUTOMATED COUNT: 14.9 % (ref 12–15)
GFR SERPL CREATININE-BSD FRML MDRD: 101 ML/MIN/1.73
GFR SERPL CREATININE-BSD FRML MDRD: 122 ML/MIN/1.73
GLOBULIN UR ELPH-MCNC: 2.6 GM/DL
GLUCOSE BLD-MCNC: 92 MG/DL (ref 70–100)
HCT VFR BLD AUTO: 25.8 % (ref 37–47)
HGB BLD-MCNC: 8.3 G/DL (ref 12–16)
HYPOCHROMIA BLD QL: NORMAL
LYMPHOCYTES # BLD MANUAL: 1.31 10*3/MM3 (ref 0.72–4.86)
LYMPHOCYTES NFR BLD MANUAL: 26 % (ref 15–45)
LYMPHOCYTES NFR BLD MANUAL: 6 % (ref 4–12)
MCH RBC QN AUTO: 30.4 PG (ref 28–32)
MCHC RBC AUTO-ENTMCNC: 32.2 G/DL (ref 33–36)
MCV RBC AUTO: 94.5 FL (ref 82–98)
MONOCYTES # BLD AUTO: 0.3 10*3/MM3 (ref 0.19–1.3)
NEUTROPHILS # BLD AUTO: 3.18 10*3/MM3 (ref 1.87–8.4)
NEUTROPHILS NFR BLD MANUAL: 61 % (ref 39–78)
NEUTS BAND NFR BLD MANUAL: 2 % (ref 0–10)
PLATELET # BLD AUTO: 489 10*3/MM3 (ref 130–400)
PMV BLD AUTO: 9.8 FL (ref 6–12)
POTASSIUM BLD-SCNC: 4.6 MMOL/L (ref 3.5–5.3)
PROT SERPL-MCNC: 4.8 G/DL (ref 6.3–8.7)
RBC # BLD AUTO: 2.73 10*6/MM3 (ref 4.2–5.4)
SCAN SLIDE: NORMAL
SMALL PLATELETS BLD QL SMEAR: NORMAL
SODIUM BLD-SCNC: 143 MMOL/L (ref 135–145)
VARIANT LYMPHS NFR BLD MANUAL: 1 % (ref 0–5)
WBC MORPH BLD: NORMAL
WBC NRBC COR # BLD: 5.04 10*3/MM3 (ref 4.8–10.8)

## 2017-11-22 PROCEDURE — 80053 COMPREHEN METABOLIC PANEL: CPT | Performed by: NURSE PRACTITIONER

## 2017-11-22 PROCEDURE — 36415 COLL VENOUS BLD VENIPUNCTURE: CPT | Performed by: NURSE PRACTITIONER

## 2017-11-22 PROCEDURE — 85007 BL SMEAR W/DIFF WBC COUNT: CPT | Performed by: NURSE PRACTITIONER

## 2017-11-22 PROCEDURE — 85025 COMPLETE CBC W/AUTO DIFF WBC: CPT | Performed by: NURSE PRACTITIONER

## 2017-11-23 LAB
BACTERIA SPEC AEROBE CULT: ABNORMAL
BACTERIA SPEC AEROBE CULT: ABNORMAL

## 2017-11-28 ENCOUNTER — LAB REQUISITION (OUTPATIENT)
Dept: LAB | Facility: HOSPITAL | Age: 67
End: 2017-11-28

## 2017-11-28 DIAGNOSIS — Z00.00 ENCOUNTER FOR GENERAL ADULT MEDICAL EXAMINATION WITHOUT ABNORMAL FINDINGS: ICD-10-CM

## 2017-11-28 LAB
ANION GAP SERPL CALCULATED.3IONS-SCNC: 7 MMOL/L (ref 4–13)
BUN BLD-MCNC: 12 MG/DL (ref 5–21)
BUN/CREAT SERPL: 24.5 (ref 7–25)
CALCIUM SPEC-SCNC: 8 MG/DL (ref 8.4–10.4)
CHLORIDE SERPL-SCNC: 97 MMOL/L (ref 98–110)
CO2 SERPL-SCNC: 32 MMOL/L (ref 24–31)
CREAT BLD-MCNC: 0.49 MG/DL (ref 0.5–1.4)
GFR SERPL CREATININE-BSD FRML MDRD: 125 ML/MIN/1.73
GFR SERPL CREATININE-BSD FRML MDRD: >150 ML/MIN/1.73
GLUCOSE BLD-MCNC: 84 MG/DL (ref 70–100)
NT-PROBNP SERPL-MCNC: 726 PG/ML (ref 0–900)
POTASSIUM BLD-SCNC: 4.9 MMOL/L (ref 3.5–5.3)
SODIUM BLD-SCNC: 136 MMOL/L (ref 135–145)

## 2017-11-28 PROCEDURE — 80048 BASIC METABOLIC PNL TOTAL CA: CPT | Performed by: NURSE PRACTITIONER

## 2017-11-28 PROCEDURE — 36415 COLL VENOUS BLD VENIPUNCTURE: CPT | Performed by: NURSE PRACTITIONER

## 2017-11-28 PROCEDURE — 83880 ASSAY OF NATRIURETIC PEPTIDE: CPT | Performed by: NURSE PRACTITIONER

## 2017-12-14 ENCOUNTER — CARE COORDINATION (OUTPATIENT)
Dept: CARE COORDINATION | Age: 67
End: 2017-12-14

## 2017-12-15 NOTE — CARE COORDINATION
[facility] Update Call    12/15/2017    Patient: Nora Calix Patient : 1950   MRN: [id number]  Reason for Admission: PT/ST/OT. Wt loss. Rehab to home or assisted living. Discharge Date: 17 RARS: [facility] Risk Score: 14.75       Care Transitions Post Acute Facility Update    Care Transitions Interventions  Post Acute Facility:  [facility]  Post Acute Facility Update  Reported Nursing Issues:  Continuous O2. Wounds on her \"bottom\" that are being treated there by the NP, Maral Langston. Improving. PT discharged pt today. See below assessment. ST working on cognitive, safety, judgement calls, problems solving. Family educated on home environment safety. Not safe to be home alone at this point in her care. Had significant wt loss before admission. Not fixing meals for herself, hygiene not kept up. ADLs:  Stand by Assist - Presence and Cueing (Comment: 12/15/17--Pt hygiene assistance is modified independant with supervision. Dressing--UE SBA, LE min assist. Toileting--SBA. LS, RN, ACC)   Bed Mobility:  Stand by Assist - Presence and Cueing   Transfer Assistance:  Independent   Ambulation Assistance:  Independent   How far (in feet) is the patient ambulating?:  150   Does patient use an assistive device?:  Yes   Assistive Devices:  Rolling, 2-wheeled walker. Facility owned. Will order one and have delivered to the facility before discharge for pt to take home with her. Barriers to Discharge:  Safety at home. Has pet, Continuous oxygen, other obstacles that will have to be managed. Anticipated discharge services:  Unsure of discharge date. Assisted Living is going to be recommended vs. going home.          Nicolasa Schwarz RN

## 2018-01-01 ENCOUNTER — OFFICE VISIT (OUTPATIENT)
Dept: FAMILY MEDICINE CLINIC | Age: 68
End: 2018-01-01
Payer: MEDICARE

## 2018-01-01 ENCOUNTER — TELEPHONE (OUTPATIENT)
Dept: FAMILY MEDICINE CLINIC | Age: 68
End: 2018-01-01

## 2018-01-01 VITALS
HEART RATE: 104 BPM | BODY MASS INDEX: 16.01 KG/M2 | SYSTOLIC BLOOD PRESSURE: 110 MMHG | WEIGHT: 74 LBS | OXYGEN SATURATION: 89 % | TEMPERATURE: 99.6 F | DIASTOLIC BLOOD PRESSURE: 62 MMHG

## 2018-01-01 VITALS
DIASTOLIC BLOOD PRESSURE: 78 MMHG | HEART RATE: 78 BPM | TEMPERATURE: 98.2 F | WEIGHT: 76 LBS | SYSTOLIC BLOOD PRESSURE: 132 MMHG | BODY MASS INDEX: 16.45 KG/M2 | OXYGEN SATURATION: 91 %

## 2018-01-01 VITALS
WEIGHT: 75 LBS | OXYGEN SATURATION: 95 % | SYSTOLIC BLOOD PRESSURE: 120 MMHG | DIASTOLIC BLOOD PRESSURE: 70 MMHG | BODY MASS INDEX: 16.23 KG/M2 | TEMPERATURE: 98 F | HEART RATE: 95 BPM

## 2018-01-01 DIAGNOSIS — J44.9 ASTHMA WITH COPD (CHRONIC OBSTRUCTIVE PULMONARY DISEASE) (HCC): ICD-10-CM

## 2018-01-01 DIAGNOSIS — M54.5 CHRONIC LOW BACK PAIN, UNSPECIFIED BACK PAIN LATERALITY, WITH SCIATICA PRESENCE UNSPECIFIED: Primary | ICD-10-CM

## 2018-01-01 DIAGNOSIS — J44.9 CHRONIC OBSTRUCTIVE PULMONARY DISEASE, UNSPECIFIED COPD TYPE (HCC): ICD-10-CM

## 2018-01-01 DIAGNOSIS — F41.9 ANXIETY: ICD-10-CM

## 2018-01-01 DIAGNOSIS — J41.1 MUCOPURULENT CHRONIC BRONCHITIS (HCC): ICD-10-CM

## 2018-01-01 DIAGNOSIS — R64 CACHEXIA (HCC): ICD-10-CM

## 2018-01-01 DIAGNOSIS — G89.29 CHRONIC LOW BACK PAIN, UNSPECIFIED BACK PAIN LATERALITY, WITH SCIATICA PRESENCE UNSPECIFIED: Primary | ICD-10-CM

## 2018-01-01 PROCEDURE — 4040F PNEUMOC VAC/ADMIN/RCVD: CPT | Performed by: FAMILY MEDICINE

## 2018-01-01 PROCEDURE — G8598 ASA/ANTIPLAT THER USED: HCPCS | Performed by: FAMILY MEDICINE

## 2018-01-01 PROCEDURE — G8400 PT W/DXA NO RESULTS DOC: HCPCS | Performed by: FAMILY MEDICINE

## 2018-01-01 PROCEDURE — G8482 FLU IMMUNIZE ORDER/ADMIN: HCPCS | Performed by: FAMILY MEDICINE

## 2018-01-01 PROCEDURE — G8427 DOCREV CUR MEDS BY ELIG CLIN: HCPCS | Performed by: FAMILY MEDICINE

## 2018-01-01 PROCEDURE — 1036F TOBACCO NON-USER: CPT | Performed by: FAMILY MEDICINE

## 2018-01-01 PROCEDURE — G8419 CALC BMI OUT NRM PARAM NOF/U: HCPCS | Performed by: FAMILY MEDICINE

## 2018-01-01 PROCEDURE — 99213 OFFICE O/P EST LOW 20 MIN: CPT | Performed by: FAMILY MEDICINE

## 2018-01-01 PROCEDURE — 3017F COLORECTAL CA SCREEN DOC REV: CPT | Performed by: FAMILY MEDICINE

## 2018-01-01 PROCEDURE — 1123F ACP DISCUSS/DSCN MKR DOCD: CPT | Performed by: FAMILY MEDICINE

## 2018-01-01 PROCEDURE — 1101F PT FALLS ASSESS-DOCD LE1/YR: CPT | Performed by: FAMILY MEDICINE

## 2018-01-01 PROCEDURE — 1090F PRES/ABSN URINE INCON ASSESS: CPT | Performed by: FAMILY MEDICINE

## 2018-01-01 PROCEDURE — 99214 OFFICE O/P EST MOD 30 MIN: CPT | Performed by: FAMILY MEDICINE

## 2018-01-01 PROCEDURE — G8926 SPIRO NO PERF OR DOC: HCPCS | Performed by: FAMILY MEDICINE

## 2018-01-01 PROCEDURE — 3023F SPIROM DOC REV: CPT | Performed by: FAMILY MEDICINE

## 2018-01-01 RX ORDER — HYDROCODONE BITARTRATE AND ACETAMINOPHEN 7.5; 325 MG/1; MG/1
1 TABLET ORAL 4 TIMES DAILY
Qty: 120 TABLET | Refills: 0 | Status: SHIPPED | OUTPATIENT
Start: 2018-01-01 | End: 2018-01-01 | Stop reason: SDUPTHER

## 2018-01-01 RX ORDER — ESCITALOPRAM OXALATE 5 MG/1
5 TABLET ORAL DAILY
Qty: 90 TABLET | Refills: 1 | Status: SHIPPED | OUTPATIENT
Start: 2018-01-01 | End: 2019-01-01 | Stop reason: ALTCHOICE

## 2018-01-01 RX ORDER — ALBUTEROL SULFATE 2.5 MG/3ML
2.5 SOLUTION RESPIRATORY (INHALATION) EVERY 6 HOURS PRN
Qty: 120 EACH | Refills: 3 | Status: SHIPPED | OUTPATIENT
Start: 2018-01-01 | End: 2019-01-01 | Stop reason: SDUPTHER

## 2018-01-01 RX ORDER — ESCITALOPRAM OXALATE 5 MG/1
5 TABLET ORAL DAILY
Qty: 30 TABLET | Refills: 5 | Status: SHIPPED | OUTPATIENT
Start: 2018-01-01 | End: 2018-01-01 | Stop reason: SDUPTHER

## 2018-01-01 RX ORDER — HYDROCODONE BITARTRATE AND ACETAMINOPHEN 7.5; 325 MG/1; MG/1
1 TABLET ORAL 4 TIMES DAILY
Qty: 120 TABLET | Refills: 0 | Status: SHIPPED | OUTPATIENT
Start: 2019-01-01 | End: 2019-01-01 | Stop reason: SDUPTHER

## 2018-01-01 RX ORDER — ALBUTEROL SULFATE 2.5 MG/3ML
2.5 SOLUTION RESPIRATORY (INHALATION) EVERY 6 HOURS PRN
Qty: 120 EACH | Refills: 3 | Status: SHIPPED | OUTPATIENT
Start: 2018-01-01 | End: 2018-01-01 | Stop reason: SDUPTHER

## 2018-01-01 RX ORDER — HYDROXYZINE HYDROCHLORIDE 25 MG/1
25 TABLET, FILM COATED ORAL EVERY 6 HOURS PRN
Qty: 30 TABLET | Refills: 2 | Status: SHIPPED | OUTPATIENT
Start: 2018-01-01 | End: 2018-01-01 | Stop reason: ALTCHOICE

## 2018-01-01 RX ORDER — BUDESONIDE AND FORMOTEROL FUMARATE DIHYDRATE 160; 4.5 UG/1; UG/1
AEROSOL RESPIRATORY (INHALATION)
Qty: 30.6 INHALER | Refills: 3 | Status: SHIPPED | OUTPATIENT
Start: 2018-01-01

## 2018-01-01 RX ORDER — CARVEDILOL 3.12 MG/1
3.12 TABLET ORAL 2 TIMES DAILY WITH MEALS
Qty: 180 TABLET | Refills: 3 | Status: SHIPPED | OUTPATIENT
Start: 2018-01-01

## 2018-01-01 RX ORDER — PREDNISONE 20 MG/1
20 TABLET ORAL 2 TIMES DAILY
Qty: 8 TABLET | Refills: 0 | Status: SHIPPED | OUTPATIENT
Start: 2018-01-01 | End: 2018-01-01

## 2018-01-01 RX ORDER — CEPHALEXIN 500 MG/1
500 CAPSULE ORAL 3 TIMES DAILY
Qty: 30 CAPSULE | Refills: 0 | Status: SHIPPED | OUTPATIENT
Start: 2018-01-01 | End: 2018-01-01

## 2018-01-01 RX ORDER — ALBUTEROL SULFATE 90 UG/1
2 AEROSOL, METERED RESPIRATORY (INHALATION) EVERY 6 HOURS PRN
Qty: 25.5 INHALER | Refills: 5 | Status: SHIPPED | OUTPATIENT
Start: 2018-01-01

## 2018-01-01 RX ORDER — ALBUTEROL SULFATE 2.5 MG/3ML
2.5 SOLUTION RESPIRATORY (INHALATION) EVERY 6 HOURS PRN
Qty: 120 EACH | Refills: 5 | Status: SHIPPED | OUTPATIENT
Start: 2018-01-01 | End: 2018-01-01 | Stop reason: ALTCHOICE

## 2018-01-01 RX ORDER — BUDESONIDE AND FORMOTEROL FUMARATE DIHYDRATE 160; 4.5 UG/1; UG/1
AEROSOL RESPIRATORY (INHALATION)
Qty: 10.2 INHALER | Refills: 5 | Status: SHIPPED | OUTPATIENT
Start: 2018-01-01 | End: 2018-01-01 | Stop reason: SDUPTHER

## 2018-01-01 ASSESSMENT — ENCOUNTER SYMPTOMS
SHORTNESS OF BREATH: 1
EYES NEGATIVE: 1
GASTROINTESTINAL NEGATIVE: 1
ALLERGIC/IMMUNOLOGIC NEGATIVE: 1
SHORTNESS OF BREATH: 1
COUGH: 1
ALLERGIC/IMMUNOLOGIC NEGATIVE: 1
BACK PAIN: 1
EYES NEGATIVE: 1
GASTROINTESTINAL NEGATIVE: 1
BACK PAIN: 1

## 2018-01-08 ENCOUNTER — CARE COORDINATION (OUTPATIENT)
Dept: CARE COORDINATION | Age: 68
End: 2018-01-08

## 2018-01-10 ENCOUNTER — APPOINTMENT (OUTPATIENT)
Dept: WOUND CARE | Facility: HOSPITAL | Age: 68
End: 2018-01-10

## 2018-01-10 PROCEDURE — G0463 HOSPITAL OUTPT CLINIC VISIT: HCPCS

## 2018-01-11 ENCOUNTER — CARE COORDINATION (OUTPATIENT)
Dept: CARE COORDINATION | Age: 68
End: 2018-01-11

## 2018-01-11 LAB — PREALBUMIN: 33 MG/DL (ref 20–40)

## 2018-01-16 ENCOUNTER — APPOINTMENT (OUTPATIENT)
Dept: WOUND CARE | Facility: HOSPITAL | Age: 68
End: 2018-01-16

## 2018-01-17 ENCOUNTER — OFFICE VISIT (OUTPATIENT)
Dept: FAMILY MEDICINE CLINIC | Age: 68
End: 2018-01-17
Payer: MEDICARE

## 2018-01-17 ENCOUNTER — APPOINTMENT (OUTPATIENT)
Dept: WOUND CARE | Facility: HOSPITAL | Age: 68
End: 2018-01-17

## 2018-01-17 ENCOUNTER — CARE COORDINATOR VISIT (OUTPATIENT)
Dept: CARE COORDINATION | Age: 68
End: 2018-01-17

## 2018-01-17 VITALS
OXYGEN SATURATION: 97 % | BODY MASS INDEX: 23.34 KG/M2 | DIASTOLIC BLOOD PRESSURE: 86 MMHG | TEMPERATURE: 98.5 F | HEART RATE: 96 BPM | SYSTOLIC BLOOD PRESSURE: 146 MMHG | RESPIRATION RATE: 16 BRPM | WEIGHT: 83 LBS

## 2018-01-17 DIAGNOSIS — Z87.81 S/P RIGHT HIP FRACTURE: Primary | ICD-10-CM

## 2018-01-17 DIAGNOSIS — L89.309 DECUBITUS ULCER OF BUTTOCK, UNSPECIFIED LATERALITY, UNSPECIFIED ULCER STAGE: ICD-10-CM

## 2018-01-17 DIAGNOSIS — M54.5 CHRONIC LOW BACK PAIN, UNSPECIFIED BACK PAIN LATERALITY, WITH SCIATICA PRESENCE UNSPECIFIED: ICD-10-CM

## 2018-01-17 DIAGNOSIS — M81.0 OSTEOPOROSIS, UNSPECIFIED OSTEOPOROSIS TYPE, UNSPECIFIED PATHOLOGICAL FRACTURE PRESENCE: ICD-10-CM

## 2018-01-17 DIAGNOSIS — G89.29 CHRONIC LOW BACK PAIN, UNSPECIFIED BACK PAIN LATERALITY, WITH SCIATICA PRESENCE UNSPECIFIED: ICD-10-CM

## 2018-01-17 LAB
ALBUMIN SERPL-MCNC: 3.7 G/DL (ref 3.5–5.2)
ALP BLD-CCNC: 68 U/L (ref 35–104)
ALT SERPL-CCNC: 10 U/L (ref 5–33)
AMPHETAMINE SCREEN, URINE: ABNORMAL
ANION GAP SERPL CALCULATED.3IONS-SCNC: 13 MMOL/L (ref 7–19)
AST SERPL-CCNC: 19 U/L (ref 5–32)
BARBITURATE SCREEN, URINE: ABNORMAL
BENZODIAZEPINE SCREEN, URINE: ABNORMAL
BILIRUB SERPL-MCNC: <0.2 MG/DL (ref 0.2–1.2)
BUN BLDV-MCNC: 11 MG/DL (ref 8–23)
CALCIUM SERPL-MCNC: 9.3 MG/DL (ref 8.8–10.2)
CHLORIDE BLD-SCNC: 98 MMOL/L (ref 98–111)
CO2: 29 MMOL/L (ref 22–29)
COCAINE METABOLITE SCREEN URINE: ABNORMAL
CREAT SERPL-MCNC: 0.5 MG/DL (ref 0.5–0.9)
GFR NON-AFRICAN AMERICAN: >60
GLUCOSE BLD-MCNC: 103 MG/DL (ref 74–109)
MDMA URINE: ABNORMAL
METHADONE SCREEN, URINE: ABNORMAL
METHAMPHETAMINE, URINE: ABNORMAL
OPIATE SCREEN URINE: ABNORMAL
OXYCODONE SCREEN URINE: ABNORMAL
PHENCYCLIDINE SCREEN URINE: ABNORMAL
POTASSIUM SERPL-SCNC: 4.6 MMOL/L (ref 3.5–5)
PROPOXYPHENE SCREEN, URINE: ABNORMAL
SODIUM BLD-SCNC: 140 MMOL/L (ref 136–145)
THC: ABNORMAL
TOTAL PROTEIN: 6.9 G/DL (ref 6.6–8.7)
TRICYCLIC ANTIDEPRESSANTS, UR: ABNORMAL
VITAMIN D 25-HYDROXY: 28.5 NG/ML

## 2018-01-17 PROCEDURE — 80305 DRUG TEST PRSMV DIR OPT OBS: CPT | Performed by: FAMILY MEDICINE

## 2018-01-17 PROCEDURE — 99214 OFFICE O/P EST MOD 30 MIN: CPT | Performed by: FAMILY MEDICINE

## 2018-01-17 RX ORDER — HYDROCODONE BITARTRATE AND ACETAMINOPHEN 7.5; 325 MG/1; MG/1
1 TABLET ORAL 4 TIMES DAILY
Qty: 120 TABLET | Refills: 0 | Status: SHIPPED | OUTPATIENT
Start: 2018-01-17 | End: 2018-02-15 | Stop reason: SDUPTHER

## 2018-01-17 RX ORDER — CARVEDILOL 3.12 MG/1
3.12 TABLET ORAL 2 TIMES DAILY WITH MEALS
COMMUNITY
End: 2018-02-08 | Stop reason: SDUPTHER

## 2018-01-17 RX ORDER — HYDROCODONE BITARTRATE AND ACETAMINOPHEN 5; 325 MG/1; MG/1
1 TABLET ORAL EVERY 4 HOURS PRN
COMMUNITY
End: 2018-01-17 | Stop reason: ALTCHOICE

## 2018-01-17 ASSESSMENT — ENCOUNTER SYMPTOMS
RESPIRATORY NEGATIVE: 1
ALLERGIC/IMMUNOLOGIC NEGATIVE: 1
GASTROINTESTINAL NEGATIVE: 1
EYES NEGATIVE: 1

## 2018-01-23 ENCOUNTER — APPOINTMENT (OUTPATIENT)
Dept: WOUND CARE | Facility: HOSPITAL | Age: 68
End: 2018-01-23

## 2018-01-31 ENCOUNTER — APPOINTMENT (OUTPATIENT)
Dept: WOUND CARE | Facility: HOSPITAL | Age: 68
End: 2018-01-31

## 2018-02-07 ENCOUNTER — APPOINTMENT (OUTPATIENT)
Dept: WOUND CARE | Facility: HOSPITAL | Age: 68
End: 2018-02-07

## 2018-02-08 ENCOUNTER — TELEPHONE (OUTPATIENT)
Dept: FAMILY MEDICINE CLINIC | Age: 68
End: 2018-02-08

## 2018-02-08 RX ORDER — CARVEDILOL 3.12 MG/1
3.12 TABLET ORAL 2 TIMES DAILY WITH MEALS
Qty: 60 TABLET | Refills: 3 | Status: SHIPPED | OUTPATIENT
Start: 2018-02-08 | End: 2018-04-05 | Stop reason: SDUPTHER

## 2018-02-14 ENCOUNTER — APPOINTMENT (OUTPATIENT)
Dept: WOUND CARE | Facility: HOSPITAL | Age: 68
End: 2018-02-14

## 2018-02-14 PROCEDURE — G0463 HOSPITAL OUTPT CLINIC VISIT: HCPCS

## 2018-02-15 ENCOUNTER — HOSPITAL ENCOUNTER (OUTPATIENT)
Dept: GENERAL RADIOLOGY | Age: 68
Discharge: HOME | End: 2018-02-15
Payer: MEDICARE

## 2018-02-15 ENCOUNTER — OFFICE VISIT (OUTPATIENT)
Dept: FAMILY MEDICINE CLINIC | Age: 68
End: 2018-02-15
Payer: MEDICARE

## 2018-02-15 VITALS
DIASTOLIC BLOOD PRESSURE: 76 MMHG | HEART RATE: 102 BPM | BODY MASS INDEX: 23.17 KG/M2 | WEIGHT: 82.38 LBS | SYSTOLIC BLOOD PRESSURE: 118 MMHG | OXYGEN SATURATION: 92 % | TEMPERATURE: 98.6 F

## 2018-02-15 DIAGNOSIS — G89.29 CHRONIC LOW BACK PAIN, UNSPECIFIED BACK PAIN LATERALITY, WITH SCIATICA PRESENCE UNSPECIFIED: ICD-10-CM

## 2018-02-15 DIAGNOSIS — M54.5 CHRONIC LOW BACK PAIN, UNSPECIFIED BACK PAIN LATERALITY, WITH SCIATICA PRESENCE UNSPECIFIED: ICD-10-CM

## 2018-02-15 DIAGNOSIS — J44.1 COPD WITH ACUTE EXACERBATION (HCC): ICD-10-CM

## 2018-02-15 DIAGNOSIS — J44.1 COPD WITH ACUTE EXACERBATION (HCC): Primary | ICD-10-CM

## 2018-02-15 PROCEDURE — G8427 DOCREV CUR MEDS BY ELIG CLIN: HCPCS | Performed by: FAMILY MEDICINE

## 2018-02-15 PROCEDURE — 1090F PRES/ABSN URINE INCON ASSESS: CPT | Performed by: FAMILY MEDICINE

## 2018-02-15 PROCEDURE — 3017F COLORECTAL CA SCREEN DOC REV: CPT | Performed by: FAMILY MEDICINE

## 2018-02-15 PROCEDURE — G8420 CALC BMI NORM PARAMETERS: HCPCS | Performed by: FAMILY MEDICINE

## 2018-02-15 PROCEDURE — G8484 FLU IMMUNIZE NO ADMIN: HCPCS | Performed by: FAMILY MEDICINE

## 2018-02-15 PROCEDURE — 3014F SCREEN MAMMO DOC REV: CPT | Performed by: FAMILY MEDICINE

## 2018-02-15 PROCEDURE — 99214 OFFICE O/P EST MOD 30 MIN: CPT | Performed by: FAMILY MEDICINE

## 2018-02-15 PROCEDURE — 3023F SPIROM DOC REV: CPT | Performed by: FAMILY MEDICINE

## 2018-02-15 PROCEDURE — 4040F PNEUMOC VAC/ADMIN/RCVD: CPT | Performed by: FAMILY MEDICINE

## 2018-02-15 PROCEDURE — G8926 SPIRO NO PERF OR DOC: HCPCS | Performed by: FAMILY MEDICINE

## 2018-02-15 PROCEDURE — 1036F TOBACCO NON-USER: CPT | Performed by: FAMILY MEDICINE

## 2018-02-15 PROCEDURE — G8400 PT W/DXA NO RESULTS DOC: HCPCS | Performed by: FAMILY MEDICINE

## 2018-02-15 PROCEDURE — 71046 X-RAY EXAM CHEST 2 VIEWS: CPT

## 2018-02-15 PROCEDURE — 1123F ACP DISCUSS/DSCN MKR DOCD: CPT | Performed by: FAMILY MEDICINE

## 2018-02-15 PROCEDURE — G8598 ASA/ANTIPLAT THER USED: HCPCS | Performed by: FAMILY MEDICINE

## 2018-02-15 RX ORDER — HYDROCODONE BITARTRATE AND ACETAMINOPHEN 7.5; 325 MG/1; MG/1
1 TABLET ORAL 4 TIMES DAILY
Qty: 120 TABLET | Refills: 0 | Status: SHIPPED | OUTPATIENT
Start: 2018-02-15 | End: 2018-02-15 | Stop reason: SDUPTHER

## 2018-02-15 RX ORDER — HYDROCODONE BITARTRATE AND ACETAMINOPHEN 7.5; 325 MG/1; MG/1
1 TABLET ORAL 4 TIMES DAILY
Qty: 120 TABLET | Refills: 0 | Status: SHIPPED | OUTPATIENT
Start: 2018-02-15 | End: 2018-03-16 | Stop reason: SDUPTHER

## 2018-02-15 RX ORDER — AMOXICILLIN AND CLAVULANATE POTASSIUM 875; 125 MG/1; MG/1
1 TABLET, FILM COATED ORAL 2 TIMES DAILY
Qty: 20 TABLET | Refills: 0 | Status: SHIPPED | OUTPATIENT
Start: 2018-02-15 | End: 2018-02-25

## 2018-02-15 RX ORDER — METHYLPREDNISOLONE 4 MG/1
TABLET ORAL
Qty: 1 KIT | Refills: 0 | Status: SHIPPED | OUTPATIENT
Start: 2018-02-15 | End: 2018-02-21

## 2018-02-15 ASSESSMENT — ENCOUNTER SYMPTOMS
BACK PAIN: 1
SHORTNESS OF BREATH: 1
WHEEZING: 1
ALLERGIC/IMMUNOLOGIC NEGATIVE: 1
EYES NEGATIVE: 1
GASTROINTESTINAL NEGATIVE: 1
COUGH: 1

## 2018-02-15 NOTE — PROGRESS NOTES
Take 1 tablet by mouth 2 times daily (with meals) 60 tablet 3    sodium hypochlorite (DAKINS) 0.125 % SOLN external solution Apply topically daily Apply moistened gauze with Dakins 1/4 str over Santyl ointment to left and right hip wounds Cover with dry gauze and secure with medipore tape, use minimal tape to secure  0    docusate (COLACE, DULCOLAX) 100 MG CAPS Take 100 mg by mouth 2 times daily      sodium chloride (OCEAN, BABY AYR) 0.65 % nasal spray 1 spray by Nasal route every 2 hours as needed for Congestion  0    fluticasone (FLONASE) 50 MCG/ACT nasal spray 1 spray by Nasal route daily 1 Bottle 3    albuterol (PROVENTIL) (2.5 MG/3ML) 0.083% nebulizer solution Take 3 mLs by nebulization every 6 hours as needed for Wheezing or Shortness of Breath 120 each 5    SPIRIVA HANDIHALER 18 MCG inhalation capsule INHALE THE CONTENT OF 1 CAPSULE VIA HANDIHALER EVERY DAY 30 capsule 5    albuterol sulfate HFA (PROAIR HFA) 108 (90 Base) MCG/ACT inhaler Inhale 2 puffs into the lungs every 6 hours as needed for Wheezing 25.5 Inhaler 5    budesonide-formoterol (SYMBICORT) 160-4.5 MCG/ACT AERO INHALE 2 PUFFS INTO THE LUNGS TWICE A DAY 10.2 Inhaler 5    calcium carbonate 600 MG TABS tablet Take 1 tablet by mouth daily.  Multiple Vitamins-Calcium (ONE-A-DAY WOMENS PO) Take  by mouth.  aspirin 81 MG tablet Take 81 mg by mouth daily.  diphenhydrAMINE (BENADRYL) 25 MG tablet Take 25 mg by mouth every 6 hours as needed for Itching. No current facility-administered medications for this visit. Allergies   Allergen Reactions    Codeine Itching    Darvocet A500 [Propoxyphene N-Acetaminophen]      Insomnia    Morphine Nausea And Vomiting       Review of Systems   Constitutional: Negative. HENT: Negative. Eyes: Negative. Respiratory: Positive for cough, shortness of breath and wheezing. Cardiovascular: Negative. Gastrointestinal: Negative. Endocrine: Negative.     Genitourinary:

## 2018-03-08 ENCOUNTER — TELEPHONE (OUTPATIENT)
Dept: FAMILY MEDICINE CLINIC | Age: 68
End: 2018-03-08

## 2018-03-08 DIAGNOSIS — J44.9 ASTHMA WITH COPD (CHRONIC OBSTRUCTIVE PULMONARY DISEASE) (HCC): ICD-10-CM

## 2018-03-08 RX ORDER — ALBUTEROL SULFATE 90 UG/1
2 AEROSOL, METERED RESPIRATORY (INHALATION) EVERY 6 HOURS PRN
Qty: 25.5 INHALER | Refills: 5 | Status: SHIPPED | OUTPATIENT
Start: 2018-03-08 | End: 2018-01-01 | Stop reason: SDUPTHER

## 2018-03-16 ENCOUNTER — OFFICE VISIT (OUTPATIENT)
Dept: FAMILY MEDICINE CLINIC | Age: 68
End: 2018-03-16
Payer: MEDICARE

## 2018-03-16 VITALS
SYSTOLIC BLOOD PRESSURE: 138 MMHG | DIASTOLIC BLOOD PRESSURE: 70 MMHG | WEIGHT: 85 LBS | HEIGHT: 57 IN | HEART RATE: 98 BPM | OXYGEN SATURATION: 84 % | TEMPERATURE: 98.6 F | BODY MASS INDEX: 18.34 KG/M2 | RESPIRATION RATE: 17 BRPM

## 2018-03-16 DIAGNOSIS — G89.29 CHRONIC LOW BACK PAIN, UNSPECIFIED BACK PAIN LATERALITY, WITH SCIATICA PRESENCE UNSPECIFIED: Primary | ICD-10-CM

## 2018-03-16 DIAGNOSIS — J44.9 CHRONIC OBSTRUCTIVE PULMONARY DISEASE, UNSPECIFIED COPD TYPE (HCC): ICD-10-CM

## 2018-03-16 DIAGNOSIS — M54.5 CHRONIC LOW BACK PAIN, UNSPECIFIED BACK PAIN LATERALITY, WITH SCIATICA PRESENCE UNSPECIFIED: Primary | ICD-10-CM

## 2018-03-16 PROBLEM — R78.81 GRAM-POSITIVE BACTEREMIA: Status: RESOLVED | Noted: 2017-10-30 | Resolved: 2018-03-16

## 2018-03-16 PROBLEM — E87.0 HYPERNATREMIA: Status: RESOLVED | Noted: 2017-10-30 | Resolved: 2018-03-16

## 2018-03-16 PROBLEM — N17.9 AKI (ACUTE KIDNEY INJURY) (HCC): Status: RESOLVED | Noted: 2017-10-30 | Resolved: 2018-03-16

## 2018-03-16 PROBLEM — S72.001A CLOSED RIGHT HIP FRACTURE, INITIAL ENCOUNTER (HCC): Status: RESOLVED | Noted: 2017-10-29 | Resolved: 2018-03-16

## 2018-03-16 PROBLEM — L89.310: Status: RESOLVED | Noted: 2017-11-03 | Resolved: 2018-03-16

## 2018-03-16 PROBLEM — L89.320: Status: RESOLVED | Noted: 2017-11-03 | Resolved: 2018-03-16

## 2018-03-16 PROBLEM — D62 POSTOPERATIVE ANEMIA DUE TO ACUTE BLOOD LOSS: Status: RESOLVED | Noted: 2017-11-03 | Resolved: 2018-03-16

## 2018-03-16 PROBLEM — G93.41 METABOLIC ENCEPHALOPATHY: Status: RESOLVED | Noted: 2017-10-30 | Resolved: 2018-03-16

## 2018-03-16 PROBLEM — M62.82 RHABDOMYOLYSIS: Status: RESOLVED | Noted: 2017-10-29 | Resolved: 2018-03-16
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
## 2018-03-16 PROCEDURE — 3014F SCREEN MAMMO DOC REV: CPT | Performed by: FAMILY MEDICINE

## 2018-03-16 PROCEDURE — G8419 CALC BMI OUT NRM PARAM NOF/U: HCPCS | Performed by: FAMILY MEDICINE

## 2018-03-16 PROCEDURE — G8482 FLU IMMUNIZE ORDER/ADMIN: HCPCS | Performed by: FAMILY MEDICINE

## 2018-03-16 PROCEDURE — 1123F ACP DISCUSS/DSCN MKR DOCD: CPT | Performed by: FAMILY MEDICINE

## 2018-03-16 PROCEDURE — G8400 PT W/DXA NO RESULTS DOC: HCPCS | Performed by: FAMILY MEDICINE

## 2018-03-16 PROCEDURE — 3017F COLORECTAL CA SCREEN DOC REV: CPT | Performed by: FAMILY MEDICINE

## 2018-03-16 PROCEDURE — 99214 OFFICE O/P EST MOD 30 MIN: CPT | Performed by: FAMILY MEDICINE

## 2018-03-16 PROCEDURE — 4040F PNEUMOC VAC/ADMIN/RCVD: CPT | Performed by: FAMILY MEDICINE

## 2018-03-16 PROCEDURE — G8926 SPIRO NO PERF OR DOC: HCPCS | Performed by: FAMILY MEDICINE

## 2018-03-16 PROCEDURE — 1036F TOBACCO NON-USER: CPT | Performed by: FAMILY MEDICINE

## 2018-03-16 PROCEDURE — 3023F SPIROM DOC REV: CPT | Performed by: FAMILY MEDICINE

## 2018-03-16 PROCEDURE — G8598 ASA/ANTIPLAT THER USED: HCPCS | Performed by: FAMILY MEDICINE

## 2018-03-16 PROCEDURE — 1090F PRES/ABSN URINE INCON ASSESS: CPT | Performed by: FAMILY MEDICINE

## 2018-03-16 PROCEDURE — G8427 DOCREV CUR MEDS BY ELIG CLIN: HCPCS | Performed by: FAMILY MEDICINE

## 2018-03-16 RX ORDER — HYDROCODONE BITARTRATE AND ACETAMINOPHEN 7.5; 325 MG/1; MG/1
1 TABLET ORAL 4 TIMES DAILY
Qty: 120 TABLET | Refills: 0 | Status: SHIPPED | OUTPATIENT
Start: 2018-03-16 | End: 2018-04-13 | Stop reason: SDUPTHER

## 2018-03-16 ASSESSMENT — ENCOUNTER SYMPTOMS
BACK PAIN: 1
SHORTNESS OF BREATH: 1
COUGH: 1
GASTROINTESTINAL NEGATIVE: 1
ALLERGIC/IMMUNOLOGIC NEGATIVE: 1
EYES NEGATIVE: 1

## 2018-03-16 NOTE — PROGRESS NOTES
SUBJECTIVE:    Patient ID: Dang Garza is a 69 y.o. female. HPI:   Patient here for follow-up of multiple medical problems  Chronic low back pain  Patient have chronic low back pain. She had this problem for years. Pain is localized to her lower back. Pain worsened with standing and walking. She takes hydrocodone for pain control. Pain medication is effective. Denies medication side effects. COPD  Patient was seen last month with a COPD exacerbation. She was treated for this problem. Her symptoms have improved but she still short of breath. Her O2 sat is 84% at rest.. She doesn't have home O2. He has any fevers or chills. No increased cough production. sHe takes Spiriva. Requests a refill on that. Past Medical History:   Diagnosis Date    FRANCIA [facility]acute kidney injury) ([facility] [address] 10/30/2017    Allergic rhinitis     Asthma 12/9/14    Dr. Dania Wetzel Cerebral artery occlusion     Cerebral artery occlusion with cerebral infarction [facility])     Chronic back pain     [facility]     Depression     Hyperlipidemia     Hypertension     Hyponatremia     Osteoarthritis     Osteoporosis     Pneumonia       Current Outpatient Prescriptions   Medication Sig Dispense Refill    HYDROcodone-acetaminophen (NORCO) 7.5-325 MG per tablet Take 1 tablet by mouth 4 times daily for 30 days.  Earliest Fill Date: 3/16/18 120 tablet 0    tiotropium (SPIRIVA HANDIHALER) 18 MCG inhalation capsule Inhale 1 capsule into the lungs daily 30 capsule 5    albuterol sulfate HFA (PROAIR HFA) 108 (90 Base) MCG/ACT inhaler Inhale 2 puffs into the lungs every 6 hours as needed for Wheezing 25.5 Inhaler 5    carvedilol (COREG) 3.125 MG tablet Take 1 tablet by mouth 2 times daily (with meals) 60 tablet 3    sodium hypochlorite (DAKINS) 0.125 % SOLN external solution Apply topically daily Apply moistened gauze with Dakins 1/4 str over Santyl ointment to left and right hip wounds Cover with dry

## 2018-04-05 ENCOUNTER — TELEPHONE (OUTPATIENT)
Dept: FAMILY MEDICINE CLINIC | Age: 68
End: 2018-04-05

## 2018-04-05 RX ORDER — CARVEDILOL 3.12 MG/1
3.12 TABLET ORAL 2 TIMES DAILY WITH MEALS
Qty: 60 TABLET | Refills: 3 | Status: SHIPPED | OUTPATIENT
Start: 2018-04-05 | End: 2018-06-13 | Stop reason: SDUPTHER

## 2018-04-13 ENCOUNTER — OFFICE VISIT (OUTPATIENT)
Dept: FAMILY MEDICINE CLINIC | Age: 68
End: 2018-04-13
Payer: MEDICARE

## 2018-04-13 VITALS
SYSTOLIC BLOOD PRESSURE: 110 MMHG | RESPIRATION RATE: 24 BRPM | WEIGHT: 84 LBS | TEMPERATURE: 98.7 F | BODY MASS INDEX: 18.12 KG/M2 | DIASTOLIC BLOOD PRESSURE: 74 MMHG | HEIGHT: 57 IN | OXYGEN SATURATION: 92 % | HEART RATE: 97 BPM

## 2018-04-13 DIAGNOSIS — J30.1 ACUTE ALLERGIC RHINITIS DUE TO POLLEN, UNSPECIFIED SEASONALITY: ICD-10-CM

## 2018-04-13 DIAGNOSIS — G89.29 CHRONIC LOW BACK PAIN, UNSPECIFIED BACK PAIN LATERALITY, WITH SCIATICA PRESENCE UNSPECIFIED: Primary | ICD-10-CM

## 2018-04-13 DIAGNOSIS — M54.5 CHRONIC LOW BACK PAIN, UNSPECIFIED BACK PAIN LATERALITY, WITH SCIATICA PRESENCE UNSPECIFIED: Primary | ICD-10-CM

## 2018-04-13 PROCEDURE — 1090F PRES/ABSN URINE INCON ASSESS: CPT | Performed by: FAMILY MEDICINE

## 2018-04-13 PROCEDURE — G8400 PT W/DXA NO RESULTS DOC: HCPCS | Performed by: FAMILY MEDICINE

## 2018-04-13 PROCEDURE — G8419 CALC BMI OUT NRM PARAM NOF/U: HCPCS | Performed by: FAMILY MEDICINE

## 2018-04-13 PROCEDURE — 1123F ACP DISCUSS/DSCN MKR DOCD: CPT | Performed by: FAMILY MEDICINE

## 2018-04-13 PROCEDURE — 1036F TOBACCO NON-USER: CPT | Performed by: FAMILY MEDICINE

## 2018-04-13 PROCEDURE — G8427 DOCREV CUR MEDS BY ELIG CLIN: HCPCS | Performed by: FAMILY MEDICINE

## 2018-04-13 PROCEDURE — 99214 OFFICE O/P EST MOD 30 MIN: CPT | Performed by: FAMILY MEDICINE

## 2018-04-13 PROCEDURE — 3014F SCREEN MAMMO DOC REV: CPT | Performed by: FAMILY MEDICINE

## 2018-04-13 PROCEDURE — G8598 ASA/ANTIPLAT THER USED: HCPCS | Performed by: FAMILY MEDICINE

## 2018-04-13 PROCEDURE — 4040F PNEUMOC VAC/ADMIN/RCVD: CPT | Performed by: FAMILY MEDICINE

## 2018-04-13 PROCEDURE — 96372 THER/PROPH/DIAG INJ SC/IM: CPT | Performed by: FAMILY MEDICINE

## 2018-04-13 PROCEDURE — 3017F COLORECTAL CA SCREEN DOC REV: CPT | Performed by: FAMILY MEDICINE

## 2018-04-13 RX ORDER — HYDROCODONE BITARTRATE AND ACETAMINOPHEN 7.5; 325 MG/1; MG/1
1 TABLET ORAL 4 TIMES DAILY
Qty: 120 TABLET | Refills: 0 | Status: SHIPPED | OUTPATIENT
Start: 2018-04-13 | End: 2018-05-15 | Stop reason: SDUPTHER

## 2018-04-13 RX ORDER — TRIAMCINOLONE ACETONIDE 40 MG/ML
40 INJECTION, SUSPENSION INTRA-ARTICULAR; INTRAMUSCULAR ONCE
Status: COMPLETED | OUTPATIENT
Start: 2018-04-13 | End: 2018-04-13

## 2018-04-13 RX ORDER — DEXAMETHASONE SODIUM PHOSPHATE 4 MG/ML
4 INJECTION, SOLUTION INTRA-ARTICULAR; INTRALESIONAL; INTRAMUSCULAR; INTRAVENOUS; SOFT TISSUE ONCE
Status: COMPLETED | OUTPATIENT
Start: 2018-04-13 | End: 2018-04-13

## 2018-04-13 RX ORDER — IPRATROPIUM BROMIDE 42 UG/1
2 SPRAY, METERED NASAL 3 TIMES DAILY
Qty: 1 BOTTLE | Refills: 3 | Status: SHIPPED | OUTPATIENT
Start: 2018-04-13 | End: 2018-09-23 | Stop reason: SDUPTHER

## 2018-04-13 RX ADMIN — DEXAMETHASONE SODIUM PHOSPHATE 4 MG: 4 INJECTION, SOLUTION INTRA-ARTICULAR; INTRALESIONAL; INTRAMUSCULAR; INTRAVENOUS; SOFT TISSUE at 11:57

## 2018-04-13 RX ADMIN — TRIAMCINOLONE ACETONIDE 40 MG: 40 INJECTION, SUSPENSION INTRA-ARTICULAR; INTRAMUSCULAR at 11:56

## 2018-04-13 ASSESSMENT — ENCOUNTER SYMPTOMS
RESPIRATORY NEGATIVE: 1
BACK PAIN: 1
ALLERGIC/IMMUNOLOGIC NEGATIVE: 1
GASTROINTESTINAL NEGATIVE: 1
RHINORRHEA: 1
EYES NEGATIVE: 1

## 2018-04-13 ASSESSMENT — PATIENT HEALTH QUESTIONNAIRE - PHQ9
2. FEELING DOWN, DEPRESSED OR HOPELESS: 0
SUM OF ALL RESPONSES TO PHQ QUESTIONS 1-9: 1
SUM OF ALL RESPONSES TO PHQ9 QUESTIONS 1 & 2: 1
1. LITTLE INTEREST OR PLEASURE IN DOING THINGS: 1

## 2018-05-14 ENCOUNTER — TELEPHONE (OUTPATIENT)
Dept: FAMILY MEDICINE CLINIC | Age: 68
End: 2018-05-14

## 2018-05-15 ENCOUNTER — OFFICE VISIT (OUTPATIENT)
Dept: FAMILY MEDICINE CLINIC | Age: 68
End: 2018-05-15
Payer: MEDICARE

## 2018-05-15 VITALS
DIASTOLIC BLOOD PRESSURE: 70 MMHG | WEIGHT: 85 LBS | SYSTOLIC BLOOD PRESSURE: 142 MMHG | BODY MASS INDEX: 18.39 KG/M2 | OXYGEN SATURATION: 92 % | TEMPERATURE: 98.4 F | HEART RATE: 94 BPM

## 2018-05-15 DIAGNOSIS — F32.0 MAJOR DEPRESSIVE DISORDER, SINGLE EPISODE, MILD (HCC): ICD-10-CM

## 2018-05-15 DIAGNOSIS — M54.5 CHRONIC LOW BACK PAIN, UNSPECIFIED BACK PAIN LATERALITY, WITH SCIATICA PRESENCE UNSPECIFIED: Primary | ICD-10-CM

## 2018-05-15 DIAGNOSIS — J44.9 ASTHMA WITH COPD (CHRONIC OBSTRUCTIVE PULMONARY DISEASE) (HCC): ICD-10-CM

## 2018-05-15 DIAGNOSIS — G89.29 CHRONIC LOW BACK PAIN, UNSPECIFIED BACK PAIN LATERALITY, WITH SCIATICA PRESENCE UNSPECIFIED: Primary | ICD-10-CM

## 2018-05-15 DIAGNOSIS — J30.9 CHRONIC ALLERGIC RHINITIS, UNSPECIFIED SEASONALITY, UNSPECIFIED TRIGGER: ICD-10-CM

## 2018-05-15 LAB
AMPHETAMINE SCREEN, URINE: ABNORMAL
BARBITURATE SCREEN, URINE: ABNORMAL
BENZODIAZEPINE SCREEN, URINE: ABNORMAL
COCAINE METABOLITE SCREEN URINE: ABNORMAL
MDMA URINE: ABNORMAL
METHADONE SCREEN, URINE: ABNORMAL
METHAMPHETAMINE, URINE: ABNORMAL
OPIATE SCREEN URINE: ABNORMAL
OXYCODONE SCREEN URINE: ABNORMAL
PHENCYCLIDINE SCREEN URINE: ABNORMAL
PROPOXYPHENE SCREEN, URINE: ABNORMAL
THC: ABNORMAL
TRICYCLIC ANTIDEPRESSANTS, UR: ABNORMAL

## 2018-05-15 PROCEDURE — 3023F SPIROM DOC REV: CPT | Performed by: FAMILY MEDICINE

## 2018-05-15 PROCEDURE — 1036F TOBACCO NON-USER: CPT | Performed by: FAMILY MEDICINE

## 2018-05-15 PROCEDURE — G8598 ASA/ANTIPLAT THER USED: HCPCS | Performed by: FAMILY MEDICINE

## 2018-05-15 PROCEDURE — G8926 SPIRO NO PERF OR DOC: HCPCS | Performed by: FAMILY MEDICINE

## 2018-05-15 PROCEDURE — 99214 OFFICE O/P EST MOD 30 MIN: CPT | Performed by: FAMILY MEDICINE

## 2018-05-15 PROCEDURE — 3017F COLORECTAL CA SCREEN DOC REV: CPT | Performed by: FAMILY MEDICINE

## 2018-05-15 PROCEDURE — 80305 DRUG TEST PRSMV DIR OPT OBS: CPT | Performed by: FAMILY MEDICINE

## 2018-05-15 PROCEDURE — 4040F PNEUMOC VAC/ADMIN/RCVD: CPT | Performed by: FAMILY MEDICINE

## 2018-05-15 PROCEDURE — G8418 CALC BMI BLW LOW PARAM F/U: HCPCS | Performed by: FAMILY MEDICINE

## 2018-05-15 PROCEDURE — G8427 DOCREV CUR MEDS BY ELIG CLIN: HCPCS | Performed by: FAMILY MEDICINE

## 2018-05-15 PROCEDURE — 1090F PRES/ABSN URINE INCON ASSESS: CPT | Performed by: FAMILY MEDICINE

## 2018-05-15 PROCEDURE — G8400 PT W/DXA NO RESULTS DOC: HCPCS | Performed by: FAMILY MEDICINE

## 2018-05-15 PROCEDURE — 1123F ACP DISCUSS/DSCN MKR DOCD: CPT | Performed by: FAMILY MEDICINE

## 2018-05-15 RX ORDER — FLUTICASONE PROPIONATE 50 MCG
1 SPRAY, SUSPENSION (ML) NASAL DAILY
Qty: 1 BOTTLE | Refills: 3 | Status: SHIPPED | OUTPATIENT
Start: 2018-05-15 | End: 2018-06-29 | Stop reason: SDUPTHER

## 2018-05-15 RX ORDER — HYDROCODONE BITARTRATE AND ACETAMINOPHEN 7.5; 325 MG/1; MG/1
1 TABLET ORAL 4 TIMES DAILY
Qty: 120 TABLET | Refills: 0 | Status: SHIPPED | OUTPATIENT
Start: 2018-05-15 | End: 2018-06-13 | Stop reason: SDUPTHER

## 2018-05-15 RX ORDER — ALBUTEROL SULFATE 2.5 MG/3ML
2.5 SOLUTION RESPIRATORY (INHALATION) EVERY 6 HOURS PRN
Qty: 120 EACH | Refills: 5 | Status: SHIPPED | OUTPATIENT
Start: 2018-05-15 | End: 2018-01-01 | Stop reason: SDUPTHER

## 2018-05-15 RX ORDER — BUDESONIDE AND FORMOTEROL FUMARATE DIHYDRATE 160; 4.5 UG/1; UG/1
AEROSOL RESPIRATORY (INHALATION)
Qty: 10.2 INHALER | Refills: 5 | Status: SHIPPED | OUTPATIENT
Start: 2018-05-15 | End: 2018-01-01 | Stop reason: SDUPTHER

## 2018-05-15 ASSESSMENT — ENCOUNTER SYMPTOMS
ALLERGIC/IMMUNOLOGIC NEGATIVE: 1
BACK PAIN: 1
GASTROINTESTINAL NEGATIVE: 1
EYES NEGATIVE: 1
SHORTNESS OF BREATH: 1

## 2018-06-13 ENCOUNTER — OFFICE VISIT (OUTPATIENT)
Dept: FAMILY MEDICINE CLINIC | Age: 68
End: 2018-06-13
Payer: MEDICARE

## 2018-06-13 VITALS
BODY MASS INDEX: 17.58 KG/M2 | SYSTOLIC BLOOD PRESSURE: 122 MMHG | OXYGEN SATURATION: 91 % | DIASTOLIC BLOOD PRESSURE: 82 MMHG | HEART RATE: 91 BPM | TEMPERATURE: 98.2 F | WEIGHT: 81.25 LBS

## 2018-06-13 DIAGNOSIS — I10 ESSENTIAL HYPERTENSION: ICD-10-CM

## 2018-06-13 DIAGNOSIS — G89.29 CHRONIC LOW BACK PAIN, UNSPECIFIED BACK PAIN LATERALITY, WITH SCIATICA PRESENCE UNSPECIFIED: Primary | ICD-10-CM

## 2018-06-13 DIAGNOSIS — M54.5 CHRONIC LOW BACK PAIN, UNSPECIFIED BACK PAIN LATERALITY, WITH SCIATICA PRESENCE UNSPECIFIED: Primary | ICD-10-CM

## 2018-06-13 PROCEDURE — G8400 PT W/DXA NO RESULTS DOC: HCPCS | Performed by: FAMILY MEDICINE

## 2018-06-13 PROCEDURE — G8418 CALC BMI BLW LOW PARAM F/U: HCPCS | Performed by: FAMILY MEDICINE

## 2018-06-13 PROCEDURE — 3017F COLORECTAL CA SCREEN DOC REV: CPT | Performed by: FAMILY MEDICINE

## 2018-06-13 PROCEDURE — 4040F PNEUMOC VAC/ADMIN/RCVD: CPT | Performed by: FAMILY MEDICINE

## 2018-06-13 PROCEDURE — 1090F PRES/ABSN URINE INCON ASSESS: CPT | Performed by: FAMILY MEDICINE

## 2018-06-13 PROCEDURE — 1123F ACP DISCUSS/DSCN MKR DOCD: CPT | Performed by: FAMILY MEDICINE

## 2018-06-13 PROCEDURE — G8427 DOCREV CUR MEDS BY ELIG CLIN: HCPCS | Performed by: FAMILY MEDICINE

## 2018-06-13 PROCEDURE — 99213 OFFICE O/P EST LOW 20 MIN: CPT | Performed by: FAMILY MEDICINE

## 2018-06-13 PROCEDURE — G8598 ASA/ANTIPLAT THER USED: HCPCS | Performed by: FAMILY MEDICINE

## 2018-06-13 PROCEDURE — 1036F TOBACCO NON-USER: CPT | Performed by: FAMILY MEDICINE

## 2018-06-13 RX ORDER — CARVEDILOL 3.12 MG/1
3.12 TABLET ORAL 2 TIMES DAILY WITH MEALS
Qty: 60 TABLET | Refills: 5 | Status: SHIPPED | OUTPATIENT
Start: 2018-06-13 | End: 2018-08-10 | Stop reason: SDUPTHER

## 2018-06-13 RX ORDER — HYDROCODONE BITARTRATE AND ACETAMINOPHEN 7.5; 325 MG/1; MG/1
1 TABLET ORAL 4 TIMES DAILY
Qty: 120 TABLET | Refills: 0 | Status: SHIPPED | OUTPATIENT
Start: 2018-06-13 | End: 2018-07-13 | Stop reason: SDUPTHER

## 2018-06-13 ASSESSMENT — ENCOUNTER SYMPTOMS
ALLERGIC/IMMUNOLOGIC NEGATIVE: 1
GASTROINTESTINAL NEGATIVE: 1
RESPIRATORY NEGATIVE: 1
EYES NEGATIVE: 1

## 2018-06-29 DIAGNOSIS — J30.9 CHRONIC ALLERGIC RHINITIS, UNSPECIFIED SEASONALITY, UNSPECIFIED TRIGGER: ICD-10-CM

## 2018-06-29 RX ORDER — FLUTICASONE PROPIONATE 50 MCG
1 SPRAY, SUSPENSION (ML) NASAL DAILY
Qty: 3 BOTTLE | Refills: 3 | Status: SHIPPED | OUTPATIENT
Start: 2018-06-29

## 2018-07-13 ENCOUNTER — OFFICE VISIT (OUTPATIENT)
Dept: FAMILY MEDICINE CLINIC | Age: 68
End: 2018-07-13
Payer: MEDICARE

## 2018-07-13 VITALS
DIASTOLIC BLOOD PRESSURE: 74 MMHG | BODY MASS INDEX: 17.53 KG/M2 | WEIGHT: 81 LBS | HEART RATE: 98 BPM | SYSTOLIC BLOOD PRESSURE: 120 MMHG | TEMPERATURE: 98.1 F | OXYGEN SATURATION: 92 %

## 2018-07-13 DIAGNOSIS — G89.29 CHRONIC LOW BACK PAIN, UNSPECIFIED BACK PAIN LATERALITY, WITH SCIATICA PRESENCE UNSPECIFIED: Primary | ICD-10-CM

## 2018-07-13 DIAGNOSIS — M54.5 CHRONIC LOW BACK PAIN, UNSPECIFIED BACK PAIN LATERALITY, WITH SCIATICA PRESENCE UNSPECIFIED: Primary | ICD-10-CM

## 2018-07-13 PROCEDURE — G8400 PT W/DXA NO RESULTS DOC: HCPCS | Performed by: FAMILY MEDICINE

## 2018-07-13 PROCEDURE — G8598 ASA/ANTIPLAT THER USED: HCPCS | Performed by: FAMILY MEDICINE

## 2018-07-13 PROCEDURE — G8418 CALC BMI BLW LOW PARAM F/U: HCPCS | Performed by: FAMILY MEDICINE

## 2018-07-13 PROCEDURE — 1101F PT FALLS ASSESS-DOCD LE1/YR: CPT | Performed by: FAMILY MEDICINE

## 2018-07-13 PROCEDURE — 1123F ACP DISCUSS/DSCN MKR DOCD: CPT | Performed by: FAMILY MEDICINE

## 2018-07-13 PROCEDURE — 1090F PRES/ABSN URINE INCON ASSESS: CPT | Performed by: FAMILY MEDICINE

## 2018-07-13 PROCEDURE — 3017F COLORECTAL CA SCREEN DOC REV: CPT | Performed by: FAMILY MEDICINE

## 2018-07-13 PROCEDURE — 1036F TOBACCO NON-USER: CPT | Performed by: FAMILY MEDICINE

## 2018-07-13 PROCEDURE — 99213 OFFICE O/P EST LOW 20 MIN: CPT | Performed by: FAMILY MEDICINE

## 2018-07-13 PROCEDURE — 4040F PNEUMOC VAC/ADMIN/RCVD: CPT | Performed by: FAMILY MEDICINE

## 2018-07-13 PROCEDURE — G8427 DOCREV CUR MEDS BY ELIG CLIN: HCPCS | Performed by: FAMILY MEDICINE

## 2018-07-13 RX ORDER — HYDROCODONE BITARTRATE AND ACETAMINOPHEN 7.5; 325 MG/1; MG/1
1 TABLET ORAL 4 TIMES DAILY
Qty: 120 TABLET | Refills: 0 | Status: SHIPPED | OUTPATIENT
Start: 2018-07-13 | End: 2018-08-10 | Stop reason: SDUPTHER

## 2018-07-13 ASSESSMENT — ENCOUNTER SYMPTOMS
EYES NEGATIVE: 1
GASTROINTESTINAL NEGATIVE: 1
RESPIRATORY NEGATIVE: 1
ALLERGIC/IMMUNOLOGIC NEGATIVE: 1

## 2018-07-13 NOTE — PROGRESS NOTES
Psychiatric: She has a normal mood and affect. Her behavior is normal. Judgment and thought content normal.   Vitals reviewed. /74 (Site: Left Arm, Position: Sitting, Cuff Size: Medium Adult)   Pulse 98   Temp 98.1 °F (36.7 °C) (Oral)   Wt 81 lb (36.7 kg)   SpO2 92%   BMI 17.53 kg/m²      ASSESSMENT:     Diagnosis Orders   1. Chronic low back pain, unspecified back pain laterality, with sciatica presence unspecified -well control  HYDROcodone-acetaminophen (NORCO) 7.5-325 MG per tablet        PLAN:  1.ricky. Cont meds.  Fu 1 mo

## 2018-07-13 NOTE — LETTER
[address] [id number]  Phone: [phone]  Fax: [phone]    Sandip Wong MD        July 13, 2018     Patient: Yamilka Elaine   YOB: 1950   Date of Visit: 7/13/2018       To Whom it May Concern:    Mayda Richter was seen in my clinic on 7/13/2018. She has recently received a summons for jury duty. Secondary to her multiple medical diagnoses, she is not physically able to perform those duties. Please take this into consideration and excuse her from jury duty. If you have any questions or concerns, please don't hesitate to call.     Sincerely,         Sandip Wong MD

## 2018-08-03 RX ORDER — CARVEDILOL 3.12 MG/1
3.12 TABLET ORAL 2 TIMES DAILY WITH MEALS
Qty: 60 TABLET | Refills: 3 | Status: SHIPPED | OUTPATIENT
Start: 2018-08-03 | End: 2018-01-01 | Stop reason: SDUPTHER

## 2018-08-10 ENCOUNTER — OFFICE VISIT (OUTPATIENT)
Dept: FAMILY MEDICINE CLINIC | Age: 68
End: 2018-08-10
Payer: MEDICARE

## 2018-08-10 VITALS
HEART RATE: 86 BPM | WEIGHT: 77 LBS | RESPIRATION RATE: 20 BRPM | DIASTOLIC BLOOD PRESSURE: 70 MMHG | HEIGHT: 57 IN | TEMPERATURE: 98.5 F | OXYGEN SATURATION: 90 % | SYSTOLIC BLOOD PRESSURE: 130 MMHG | BODY MASS INDEX: 16.61 KG/M2

## 2018-08-10 DIAGNOSIS — G89.29 CHRONIC LOW BACK PAIN, UNSPECIFIED BACK PAIN LATERALITY, WITH SCIATICA PRESENCE UNSPECIFIED: ICD-10-CM

## 2018-08-10 DIAGNOSIS — M54.5 CHRONIC LOW BACK PAIN, UNSPECIFIED BACK PAIN LATERALITY, WITH SCIATICA PRESENCE UNSPECIFIED: ICD-10-CM

## 2018-08-10 PROCEDURE — 3017F COLORECTAL CA SCREEN DOC REV: CPT | Performed by: FAMILY MEDICINE

## 2018-08-10 PROCEDURE — G8427 DOCREV CUR MEDS BY ELIG CLIN: HCPCS | Performed by: FAMILY MEDICINE

## 2018-08-10 PROCEDURE — 1090F PRES/ABSN URINE INCON ASSESS: CPT | Performed by: FAMILY MEDICINE

## 2018-08-10 PROCEDURE — 99213 OFFICE O/P EST LOW 20 MIN: CPT | Performed by: FAMILY MEDICINE

## 2018-08-10 PROCEDURE — 1101F PT FALLS ASSESS-DOCD LE1/YR: CPT | Performed by: FAMILY MEDICINE

## 2018-08-10 PROCEDURE — G8419 CALC BMI OUT NRM PARAM NOF/U: HCPCS | Performed by: FAMILY MEDICINE

## 2018-08-10 PROCEDURE — 4040F PNEUMOC VAC/ADMIN/RCVD: CPT | Performed by: FAMILY MEDICINE

## 2018-08-10 PROCEDURE — G8400 PT W/DXA NO RESULTS DOC: HCPCS | Performed by: FAMILY MEDICINE

## 2018-08-10 PROCEDURE — 1036F TOBACCO NON-USER: CPT | Performed by: FAMILY MEDICINE

## 2018-08-10 PROCEDURE — 1123F ACP DISCUSS/DSCN MKR DOCD: CPT | Performed by: FAMILY MEDICINE

## 2018-08-10 PROCEDURE — G8598 ASA/ANTIPLAT THER USED: HCPCS | Performed by: FAMILY MEDICINE

## 2018-08-10 RX ORDER — HYDROCODONE BITARTRATE AND ACETAMINOPHEN 7.5; 325 MG/1; MG/1
1 TABLET ORAL 4 TIMES DAILY
Qty: 120 TABLET | Refills: 0 | Status: SHIPPED | OUTPATIENT
Start: 2018-08-10 | End: 2018-09-05 | Stop reason: SDUPTHER

## 2018-08-10 ASSESSMENT — ENCOUNTER SYMPTOMS
RESPIRATORY NEGATIVE: 1
BACK PAIN: 1
ALLERGIC/IMMUNOLOGIC NEGATIVE: 1
GASTROINTESTINAL NEGATIVE: 1
EYES NEGATIVE: 1

## 2018-08-10 NOTE — PROGRESS NOTES
normal mood and affect. Her behavior is normal. Judgment and thought content normal.   Vitals reviewed. /70   Pulse 86   Temp 98.5 °F (36.9 °C) (Oral)   Resp 20   Ht 4' 9\" (1.448 m)   Wt 77 lb (34.9 kg)   SpO2 90% Comment: O2 @ 2L/nc  BMI 16.66 kg/m²      ASSESSMENT:     Diagnosis Orders   1. Chronic low back pain, unspecified back pain laterality, with sciatica presence unspecified Stable  HYDROcodone-acetaminophen (NORCO) 7.5-325 MG per tablet        PLAN:    1.Shawn. Continue medication.  Follow-up in a month

## 2018-09-05 ENCOUNTER — OFFICE VISIT (OUTPATIENT)
Dept: FAMILY MEDICINE CLINIC | Age: 68
End: 2018-09-05
Payer: MEDICARE

## 2018-09-05 VITALS
SYSTOLIC BLOOD PRESSURE: 120 MMHG | OXYGEN SATURATION: 93 % | TEMPERATURE: 98.7 F | HEART RATE: 96 BPM | BODY MASS INDEX: 16.69 KG/M2 | DIASTOLIC BLOOD PRESSURE: 76 MMHG | WEIGHT: 77.13 LBS

## 2018-09-05 DIAGNOSIS — G89.29 CHRONIC LOW BACK PAIN, UNSPECIFIED BACK PAIN LATERALITY, WITH SCIATICA PRESENCE UNSPECIFIED: Primary | ICD-10-CM

## 2018-09-05 DIAGNOSIS — M54.5 CHRONIC LOW BACK PAIN, UNSPECIFIED BACK PAIN LATERALITY, WITH SCIATICA PRESENCE UNSPECIFIED: Primary | ICD-10-CM

## 2018-09-05 PROCEDURE — 1101F PT FALLS ASSESS-DOCD LE1/YR: CPT | Performed by: FAMILY MEDICINE

## 2018-09-05 PROCEDURE — G8427 DOCREV CUR MEDS BY ELIG CLIN: HCPCS | Performed by: FAMILY MEDICINE

## 2018-09-05 PROCEDURE — G8598 ASA/ANTIPLAT THER USED: HCPCS | Performed by: FAMILY MEDICINE

## 2018-09-05 PROCEDURE — 1036F TOBACCO NON-USER: CPT | Performed by: FAMILY MEDICINE

## 2018-09-05 PROCEDURE — 3017F COLORECTAL CA SCREEN DOC REV: CPT | Performed by: FAMILY MEDICINE

## 2018-09-05 PROCEDURE — 99213 OFFICE O/P EST LOW 20 MIN: CPT | Performed by: FAMILY MEDICINE

## 2018-09-05 PROCEDURE — 1090F PRES/ABSN URINE INCON ASSESS: CPT | Performed by: FAMILY MEDICINE

## 2018-09-05 PROCEDURE — G8400 PT W/DXA NO RESULTS DOC: HCPCS | Performed by: FAMILY MEDICINE

## 2018-09-05 PROCEDURE — G8419 CALC BMI OUT NRM PARAM NOF/U: HCPCS | Performed by: FAMILY MEDICINE

## 2018-09-05 PROCEDURE — 1123F ACP DISCUSS/DSCN MKR DOCD: CPT | Performed by: FAMILY MEDICINE

## 2018-09-05 PROCEDURE — 4040F PNEUMOC VAC/ADMIN/RCVD: CPT | Performed by: FAMILY MEDICINE

## 2018-09-05 RX ORDER — HYDROCODONE BITARTRATE AND ACETAMINOPHEN 7.5; 325 MG/1; MG/1
1 TABLET ORAL 4 TIMES DAILY
Qty: 120 TABLET | Refills: 0 | Status: SHIPPED | OUTPATIENT
Start: 2018-09-08 | End: 2018-10-03 | Stop reason: SDUPTHER

## 2018-09-05 ASSESSMENT — ENCOUNTER SYMPTOMS
GASTROINTESTINAL NEGATIVE: 1
EYES NEGATIVE: 1
SHORTNESS OF BREATH: 1
ALLERGIC/IMMUNOLOGIC NEGATIVE: 1
BACK PAIN: 1

## 2018-09-23 DIAGNOSIS — J30.1 ACUTE ALLERGIC RHINITIS DUE TO POLLEN: ICD-10-CM

## 2018-09-24 RX ORDER — IPRATROPIUM BROMIDE 42 UG/1
SPRAY, METERED NASAL
Qty: 1 BOTTLE | Refills: 5 | Status: SHIPPED | OUTPATIENT
Start: 2018-09-24 | End: 2019-01-01 | Stop reason: SDUPTHER

## 2018-10-03 ENCOUNTER — OFFICE VISIT (OUTPATIENT)
Dept: FAMILY MEDICINE CLINIC | Age: 68
End: 2018-10-03
Payer: MEDICARE

## 2018-10-03 VITALS
HEART RATE: 90 BPM | TEMPERATURE: 98.1 F | OXYGEN SATURATION: 90 % | WEIGHT: 76 LBS | DIASTOLIC BLOOD PRESSURE: 74 MMHG | BODY MASS INDEX: 16.45 KG/M2 | SYSTOLIC BLOOD PRESSURE: 126 MMHG

## 2018-10-03 DIAGNOSIS — G89.29 CHRONIC LOW BACK PAIN, UNSPECIFIED BACK PAIN LATERALITY, WITH SCIATICA PRESENCE UNSPECIFIED: Primary | ICD-10-CM

## 2018-10-03 DIAGNOSIS — M54.5 CHRONIC LOW BACK PAIN, UNSPECIFIED BACK PAIN LATERALITY, WITH SCIATICA PRESENCE UNSPECIFIED: Primary | ICD-10-CM

## 2018-10-03 DIAGNOSIS — Z23 NEED FOR INFLUENZA VACCINATION: ICD-10-CM

## 2018-10-03 LAB
AMPHETAMINE SCREEN, URINE: ABNORMAL
BARBITURATE SCREEN, URINE: ABNORMAL
BENZODIAZEPINE SCREEN, URINE: ABNORMAL
BUPRENORPHINE URINE: ABNORMAL
COCAINE METABOLITE SCREEN URINE: ABNORMAL
GABAPENTIN SCREEN, URINE: ABNORMAL
METHADONE SCREEN, URINE: ABNORMAL
METHAMPHETAMINE, URINE: ABNORMAL
OPIATE SCREEN URINE: ABNORMAL
OXYCODONE SCREEN URINE: ABNORMAL
PHENCYCLIDINE SCREEN URINE: ABNORMAL
PROPOXYPHENE SCREEN, URINE: ABNORMAL
THC SCREEN, URINE: ABNORMAL
TRICYCLIC ANTIDEPRESSANTS, UR: ABNORMAL

## 2018-10-03 PROCEDURE — 80305 DRUG TEST PRSMV DIR OPT OBS: CPT | Performed by: FAMILY MEDICINE

## 2018-10-03 PROCEDURE — G8419 CALC BMI OUT NRM PARAM NOF/U: HCPCS | Performed by: FAMILY MEDICINE

## 2018-10-03 PROCEDURE — 1036F TOBACCO NON-USER: CPT | Performed by: FAMILY MEDICINE

## 2018-10-03 PROCEDURE — 90662 IIV NO PRSV INCREASED AG IM: CPT | Performed by: FAMILY MEDICINE

## 2018-10-03 PROCEDURE — G8598 ASA/ANTIPLAT THER USED: HCPCS | Performed by: FAMILY MEDICINE

## 2018-10-03 PROCEDURE — 1123F ACP DISCUSS/DSCN MKR DOCD: CPT | Performed by: FAMILY MEDICINE

## 2018-10-03 PROCEDURE — G8482 FLU IMMUNIZE ORDER/ADMIN: HCPCS | Performed by: FAMILY MEDICINE

## 2018-10-03 PROCEDURE — 3017F COLORECTAL CA SCREEN DOC REV: CPT | Performed by: FAMILY MEDICINE

## 2018-10-03 PROCEDURE — 4040F PNEUMOC VAC/ADMIN/RCVD: CPT | Performed by: FAMILY MEDICINE

## 2018-10-03 PROCEDURE — G8400 PT W/DXA NO RESULTS DOC: HCPCS | Performed by: FAMILY MEDICINE

## 2018-10-03 PROCEDURE — 99213 OFFICE O/P EST LOW 20 MIN: CPT | Performed by: FAMILY MEDICINE

## 2018-10-03 PROCEDURE — G0008 ADMIN INFLUENZA VIRUS VAC: HCPCS | Performed by: FAMILY MEDICINE

## 2018-10-03 PROCEDURE — G8427 DOCREV CUR MEDS BY ELIG CLIN: HCPCS | Performed by: FAMILY MEDICINE

## 2018-10-03 PROCEDURE — 1101F PT FALLS ASSESS-DOCD LE1/YR: CPT | Performed by: FAMILY MEDICINE

## 2018-10-03 PROCEDURE — 1090F PRES/ABSN URINE INCON ASSESS: CPT | Performed by: FAMILY MEDICINE

## 2018-10-03 RX ORDER — HYDROCODONE BITARTRATE AND ACETAMINOPHEN 7.5; 325 MG/1; MG/1
1 TABLET ORAL 4 TIMES DAILY
Qty: 120 TABLET | Refills: 0 | Status: SHIPPED | OUTPATIENT
Start: 2018-01-01 | End: 2018-01-01 | Stop reason: SDUPTHER

## 2018-10-03 ASSESSMENT — ENCOUNTER SYMPTOMS
GASTROINTESTINAL NEGATIVE: 1
EYES NEGATIVE: 1
BACK PAIN: 1
SHORTNESS OF BREATH: 1
ALLERGIC/IMMUNOLOGIC NEGATIVE: 1

## 2018-10-03 NOTE — PROGRESS NOTES
SUBJECTIVE:    Patient ID: Julia Ardon is a 77 y.o. female. HPI:   Patient here for follow-up of low back pain  Low back pain  Patient have chronic low back pain. She takes hydrocodone for pain control. She is compliant with medication. She is able to keep same level of function. NSAIDs no sufficient to control pain. Requests a refill her medications today. Last pill was earlier today. Health maintenance need influenza vaccine         Past Medical History:   Diagnosis Date    FRANCIA [facility]) ([facility] [address] 10/30/2017    Allergic rhinitis     Asthma 12/9/14    Dr. Curly Milan Cerebral artery occlusion     Cerebral artery occlusion with cerebral infarction [facility])     Chronic back pain     [facility]     Depression     Hyperlipidemia     Hypertension     Hyponatremia     Osteoarthritis     Osteoporosis     Pneumonia       Current Outpatient Prescriptions   Medication Sig Dispense Refill    [START ON 10/8/2018] HYDROcodone-acetaminophen (NORCO) 7.5-325 MG per tablet Take 1 tablet by mouth 4 times daily for 30 days. . 120 tablet 0    ipratropium (ATROVENT) 0.06 % nasal spray USE 2 SPRAYS IN EACH NOSTRIL 3 TIMES A DAY 1 Bottle 5    carvedilol (COREG) 3.125 MG tablet TAKE 1 TABLET BY MOUTH 2 TIMES DAILY (WITH MEALS) 60 tablet 3    fluticasone (FLONASE) 50 MCG/ACT nasal spray 1 spray by Nasal route daily 90 day supply 3 Bottle 3    albuterol (PROVENTIL) (2.5 MG/3ML) 0.083% nebulizer solution Take 3 mLs by nebulization every 6 hours as needed for Wheezing or Shortness of Breath 120 each 5    budesonide-formoterol (SYMBICORT) 160-4.5 MCG/ACT AERO INHALE 2 PUFFS INTO THE LUNGS TWICE A DAY 10.2 Inhaler 5    tiotropium (SPIRIVA HANDIHALER) 18 MCG inhalation capsule Inhale 1 capsule into the lungs daily 30 capsule 5    albuterol sulfate HFA (PROAIR HFA) 108 (90 Base) MCG/ACT inhaler Inhale 2 puffs into the lungs every 6 hours as needed for Wheezing 25.5

## 2018-10-31 NOTE — PROGRESS NOTES
SUBJECTIVE:    Patient ID: Henrique Green is a 77 y. o.female. HPI:   Patient here for follow-up of multiple medical problems  Back pain  Patient have chronic low back pain. She takes hydrocodone for pain control. She is compliant with medication. Denies medication side effects. Requests a refill of medication. She is able to keep same level of function. COPD  Patient has COPD. She used to be a smoker. She is on oxygen therapy. She's on triple therapy. Requests a refill on medications. No recent exacerbations. No recent hospital stay for COPD. She is up-to-date on influenza and pneumonia vaccines  Anxiety  Patient have anxiety disorder. Patient went to take something only when she needs it especially before doctor's appointment. She requests something like Valium. Past Medical History:   Diagnosis Date    FR[facility]IA (acute [facility]) ([facility] [address] 10/30/2017    Allergic rhinitis     Asthma 12/9/14    Dr. Abraham Moralez Cerebral artery occlusion     Cerebral artery occlusion with cerebral infarction [facility])     Chronic back pain     [facility]     Depression     Hyperlipidemia     Hypertension     Hyponatremia     Osteoarthritis     Osteoporosis     Pneumonia       Current Outpatient Prescriptions   Medication Sig Dispense Refill    [START ON 11/7/2018] HYDROcodone-acetaminophen (NORCO) 7.5-325 MG per tablet Take 1 tablet by mouth 4 times daily for 30 days. . 120 tablet 0    albuterol (PROVENTIL) (2.5 MG/3ML) 0.083% nebulizer solution Take 3 mLs by nebulization every 6 hours as needed for Wheezing or Shortness of Breath 120 each 5    budesonide-formoterol (SYMBICORT) 160-4.5 MCG/ACT AERO INHALE 2 PUFFS INTO THE LUNGS TWICE A DAY 10.2 Inhaler 5    tiotropium (SPIRIVA HANDIHALER) 18 MCG inhalation capsule Inhale 1 capsule into the lungs daily 30 capsule 5    albuterol sulfate HFA (PROAIR HFA) 108 (90 Base) MCG/ACT inhaler Inhale 2 puffs into the lungs every 6

## 2018-11-30 NOTE — PROGRESS NOTES
SUBJECTIVE:    Patient ID: Dharmesh Liriano is a 77 y. o.female. HPI:   Patient here for follow-up of multiple medical problems  Back pain  Patient had chronic back pain. She takes hydrocodone for pain control. She is compliant with medication. Denies medication side effects. She is able to live independently. Medication is effective. Requests a refill of medication. COPD  Patient has COPD. For some reason her insurance will did not do pay for albuterol. Not sure why. She have chronic COPD and she needs albuterol multiple times a day to help with her COPD. She no longer smoking. Anxiety  Patient have anxiety. She worries constantly. She thinks above the same thing over and over again. Not suicidal nor homicidal. She wants something like Valium. I explained to her that benzodiazepines and narcotics are contraindicated especially in somebody with respiratory failure. Recommended to try Vistaril. Unfortunately her insurance will not cover it. I recommend that Lexapro last time she does not want to take something the regular basis. She been to try something like that now. Cachexia  Patient have cachexia secondary to COPD. Her weights been stable. Past Medical History:   Diagnosis Date    [facility]IA [facility]) ([facility] [address] 10/30/2017    Allergic rhinitis     Asthma 12/9/14    Dr. Nicholas Duran Cerebral artery occlusion     [facility]bral [facility] occlusion [facility] infarction [facility])     Chronic back pain     [facility]     Depression     Hyperlipidemia     Hypertension     Hyponatremia     Osteoarthritis     Osteoporosis     Pneumonia       Current Outpatient Prescriptions   Medication Sig Dispense Refill    [START ON 12/6/2018] HYDROcodone-acetaminophen (NORCO) 7.5-325 MG per tablet Take 1 tablet by mouth 4 times daily for 30 days. . 120 tablet 0    albuterol (PROVENTIL) (2.5 MG/3ML) 0.083% nebulizer solution Take 3 mLs by nebulization every 6 hours as

## 2019-01-01 ENCOUNTER — OFFICE VISIT (OUTPATIENT)
Dept: FAMILY MEDICINE CLINIC | Age: 69
End: 2019-01-01
Payer: MEDICARE

## 2019-01-01 ENCOUNTER — HOSPITAL ENCOUNTER (EMERGENCY)
Age: 69
Discharge: HOME | End: 2019-10-01
Payer: MEDICARE

## 2019-01-01 ENCOUNTER — APPOINTMENT (OUTPATIENT)
Dept: CT IMAGING | Age: 69
End: 2019-01-01
Payer: MEDICARE

## 2019-01-01 VITALS
SYSTOLIC BLOOD PRESSURE: 124 MMHG | DIASTOLIC BLOOD PRESSURE: 62 MMHG | BODY MASS INDEX: 15.15 KG/M2 | WEIGHT: 70 LBS | OXYGEN SATURATION: 90 % | TEMPERATURE: 97.7 F | HEART RATE: 76 BPM

## 2019-01-01 VITALS
TEMPERATURE: 99.1 F | SYSTOLIC BLOOD PRESSURE: 134 MMHG | WEIGHT: 70 LBS | HEART RATE: 103 BPM | DIASTOLIC BLOOD PRESSURE: 78 MMHG | BODY MASS INDEX: 15.15 KG/M2 | OXYGEN SATURATION: 92 %

## 2019-01-01 VITALS
SYSTOLIC BLOOD PRESSURE: 110 MMHG | HEART RATE: 73 BPM | TEMPERATURE: 99.1 F | OXYGEN SATURATION: 94 % | BODY MASS INDEX: 16.45 KG/M2 | WEIGHT: 76 LBS | DIASTOLIC BLOOD PRESSURE: 62 MMHG

## 2019-01-01 VITALS
DIASTOLIC BLOOD PRESSURE: 70 MMHG | TEMPERATURE: 98.7 F | SYSTOLIC BLOOD PRESSURE: 126 MMHG | WEIGHT: 74 LBS | DIASTOLIC BLOOD PRESSURE: 72 MMHG | HEART RATE: 87 BPM | BODY MASS INDEX: 36.14 KG/M2 | SYSTOLIC BLOOD PRESSURE: 136 MMHG | BODY MASS INDEX: 16.01 KG/M2 | OXYGEN SATURATION: 93 % | TEMPERATURE: 98.7 F | HEART RATE: 90 BPM | WEIGHT: 167 LBS | OXYGEN SATURATION: 90 %

## 2019-01-01 VITALS
TEMPERATURE: 98.4 F | SYSTOLIC BLOOD PRESSURE: 120 MMHG | WEIGHT: 72 LBS | RESPIRATION RATE: 22 BRPM | OXYGEN SATURATION: 90 % | HEART RATE: 102 BPM | BODY MASS INDEX: 15.58 KG/M2 | DIASTOLIC BLOOD PRESSURE: 72 MMHG

## 2019-01-01 VITALS
WEIGHT: 74 LBS | TEMPERATURE: 97.8 F | BODY MASS INDEX: 16.01 KG/M2 | SYSTOLIC BLOOD PRESSURE: 144 MMHG | DIASTOLIC BLOOD PRESSURE: 78 MMHG | HEART RATE: 120 BPM | OXYGEN SATURATION: 92 %

## 2019-01-01 VITALS
OXYGEN SATURATION: 91 % | DIASTOLIC BLOOD PRESSURE: 87 MMHG | HEART RATE: 89 BPM | RESPIRATION RATE: 15 BRPM | SYSTOLIC BLOOD PRESSURE: 142 MMHG | TEMPERATURE: 98.5 F

## 2019-01-01 DIAGNOSIS — G89.29 CHRONIC LOW BACK PAIN, UNSPECIFIED BACK PAIN LATERALITY, WITH SCIATICA PRESENCE UNSPECIFIED: Primary | ICD-10-CM

## 2019-01-01 DIAGNOSIS — G89.29 CHRONIC LOW BACK PAIN, UNSPECIFIED BACK PAIN LATERALITY, WITH SCIATICA PRESENCE UNSPECIFIED: ICD-10-CM

## 2019-01-01 DIAGNOSIS — M54.5 CHRONIC LOW BACK PAIN, UNSPECIFIED BACK PAIN LATERALITY, WITH SCIATICA PRESENCE UNSPECIFIED: ICD-10-CM

## 2019-01-01 DIAGNOSIS — M54.5 CHRONIC LOW BACK PAIN, UNSPECIFIED BACK PAIN LATERALITY, WITH SCIATICA PRESENCE UNSPECIFIED: Primary | ICD-10-CM

## 2019-01-01 DIAGNOSIS — F41.9 ANXIETY: ICD-10-CM

## 2019-01-01 DIAGNOSIS — J41.1 MUCOPURULENT CHRONIC BRONCHITIS (HCC): ICD-10-CM

## 2019-01-01 DIAGNOSIS — J44.9 CHRONIC OBSTRUCTIVE PULMONARY DISEASE, UNSPECIFIED COPD TYPE (HCC): ICD-10-CM

## 2019-01-01 DIAGNOSIS — Z53.20 MAMMOGRAM DECLINED: ICD-10-CM

## 2019-01-01 DIAGNOSIS — K62.5 RECTAL BLEEDING: ICD-10-CM

## 2019-01-01 DIAGNOSIS — R64 CACHEXIA (HCC): ICD-10-CM

## 2019-01-01 DIAGNOSIS — K56.41 FECAL IMPACTION (HCC): ICD-10-CM

## 2019-01-01 DIAGNOSIS — F32.4 MAJOR DEPRESSIVE DISORDER IN PARTIAL REMISSION, UNSPECIFIED WHETHER RECURRENT (HCC): ICD-10-CM

## 2019-01-01 DIAGNOSIS — J30.1 ACUTE ALLERGIC RHINITIS DUE TO POLLEN: ICD-10-CM

## 2019-01-01 DIAGNOSIS — F32.0 MAJOR DEPRESSIVE DISORDER, SINGLE EPISODE, MILD (HCC): ICD-10-CM

## 2019-01-01 DIAGNOSIS — J44.9 ASTHMA WITH COPD (CHRONIC OBSTRUCTIVE PULMONARY DISEASE) (HCC): ICD-10-CM

## 2019-01-01 DIAGNOSIS — Z23 NEED FOR INFLUENZA VACCINATION: ICD-10-CM

## 2019-01-01 DIAGNOSIS — K59.00 CONSTIPATION, UNSPECIFIED CONSTIPATION TYPE: Primary | ICD-10-CM

## 2019-01-01 DIAGNOSIS — I10 ESSENTIAL HYPERTENSION: Primary | ICD-10-CM

## 2019-01-01 LAB
ABO/RH: NORMAL
ALBUMIN SERPL-MCNC: 4.1 G/DL (ref 3.5–5.2)
ALP BLD-CCNC: 76 U/L (ref 35–104)
ALT SERPL-CCNC: 9 U/L (ref 5–33)
ANION GAP SERPL CALCULATED.3IONS-SCNC: 13 MMOL/L (ref 7–19)
ANTIBODY SCREEN: NORMAL
AST SERPL-CCNC: 24 U/L (ref 5–32)
BASOPHILS ABSOLUTE: 0 K/UL (ref 0–0.2)
BASOPHILS RELATIVE PERCENT: 0.3 % (ref 0–1)
BILIRUB SERPL-MCNC: 0.3 MG/DL (ref 0.2–1.2)
BUN BLDV-MCNC: 10 MG/DL (ref 8–23)
CALCIUM SERPL-MCNC: 10.1 MG/DL (ref 8.8–10.2)
CHLORIDE BLD-SCNC: 86 MMOL/L (ref 98–111)
CO2: 34 MMOL/L (ref 22–29)
CREAT SERPL-MCNC: 0.5 MG/DL (ref 0.5–0.9)
EOSINOPHILS ABSOLUTE: 0 K/UL (ref 0–0.6)
EOSINOPHILS RELATIVE PERCENT: 0.3 % (ref 0–5)
GFR NON-AFRICAN AMERICAN: >60
GLUCOSE BLD-MCNC: 108 MG/DL (ref 74–109)
HCT VFR BLD CALC: 34 % (ref 37–47)
HEMOGLOBIN: 10.8 G/DL (ref 12–16)
IMMATURE GRANULOCYTES #: 0 K/UL
LYMPHOCYTES ABSOLUTE: 1.6 K/UL (ref 1.1–4.5)
LYMPHOCYTES RELATIVE PERCENT: 13.4 % (ref 20–40)
MCH RBC QN AUTO: 30.7 PG (ref 27–31)
MCHC RBC AUTO-ENTMCNC: 31.8 G/DL (ref 33–37)
MCV RBC AUTO: 96.6 FL (ref 81–99)
MONOCYTES ABSOLUTE: 0.8 K/UL (ref 0–0.9)
MONOCYTES RELATIVE PERCENT: 6.4 % (ref 0–10)
NEUTROPHILS ABSOLUTE: 9.3 K/UL (ref 1.5–7.5)
NEUTROPHILS RELATIVE PERCENT: 79.3 % (ref 50–65)
PDW BLD-RTO: 12 % (ref 11.5–14.5)
PLATELET # BLD: 500 K/UL (ref 130–400)
PMV BLD AUTO: 9.9 FL (ref 9.4–12.3)
POTASSIUM SERPL-SCNC: 4.1 MMOL/L (ref 3.5–5)
RBC # BLD: 3.52 M/UL (ref 4.2–5.4)
SODIUM BLD-SCNC: 133 MMOL/L (ref 136–145)
TOTAL PROTEIN: 7.1 G/DL (ref 6.6–8.7)
WBC # BLD: 11.8 K/UL (ref 4.8–10.8)

## 2019-01-01 PROCEDURE — 99214 OFFICE O/P EST MOD 30 MIN: CPT | Performed by: FAMILY MEDICINE

## 2019-01-01 PROCEDURE — 99213 OFFICE O/P EST LOW 20 MIN: CPT | Performed by: FAMILY MEDICINE

## 2019-01-01 PROCEDURE — G0008 ADMIN INFLUENZA VIRUS VAC: HCPCS | Performed by: FAMILY MEDICINE

## 2019-01-01 PROCEDURE — 86850 RBC ANTIBODY SCREEN: CPT

## 2019-01-01 PROCEDURE — 86900 BLOOD TYPING SEROLOGIC ABO: CPT

## 2019-01-01 PROCEDURE — 99284 EMERGENCY DEPT VISIT MOD MDM: CPT

## 2019-01-01 PROCEDURE — 80053 COMPREHEN METABOLIC PANEL: CPT

## 2019-01-01 PROCEDURE — 90653 IIV ADJUVANT VACCINE IM: CPT | Performed by: FAMILY MEDICINE

## 2019-01-01 PROCEDURE — 6360000004 HC RX CONTRAST MEDICATION: Performed by: PHYSICIAN ASSISTANT

## 2019-01-01 PROCEDURE — 74177 CT ABD & PELVIS W/CONTRAST: CPT

## 2019-01-01 PROCEDURE — 6370000000 HC RX 637 (ALT 250 FOR IP): Performed by: PHYSICIAN ASSISTANT

## 2019-01-01 PROCEDURE — 86901 BLOOD TYPING SEROLOGIC RH(D): CPT

## 2019-01-01 PROCEDURE — 36415 COLL VENOUS BLD VENIPUNCTURE: CPT

## 2019-01-01 PROCEDURE — 85025 COMPLETE CBC W/AUTO DIFF WBC: CPT

## 2019-01-01 RX ORDER — DULOXETIN HYDROCHLORIDE 30 MG/1
30 CAPSULE, DELAYED RELEASE ORAL DAILY
Qty: 30 CAPSULE | Refills: 5 | Status: SHIPPED | OUTPATIENT
Start: 2019-01-01 | End: 2019-01-01 | Stop reason: ALTCHOICE

## 2019-01-01 RX ORDER — ALBUTEROL SULFATE 2.5 MG/3ML
2.5 SOLUTION RESPIRATORY (INHALATION) EVERY 6 HOURS PRN
Qty: 360 EACH | Refills: 3 | Status: SHIPPED | OUTPATIENT
Start: 2019-01-01 | End: 2019-01-01 | Stop reason: SDUPTHER

## 2019-01-01 RX ORDER — HYDROCODONE BITARTRATE AND ACETAMINOPHEN 7.5; 325 MG/1; MG/1
1 TABLET ORAL 4 TIMES DAILY
Qty: 120 TABLET | Refills: 0 | Status: SHIPPED | OUTPATIENT
Start: 2019-01-01 | End: 2019-01-01 | Stop reason: SDUPTHER

## 2019-01-01 RX ORDER — SODIUM PHOSPHATE, DIBASIC AND SODIUM PHOSPHATE, MONOBASIC 7; 19 G/133ML; G/133ML
1 ENEMA RECTAL
Status: COMPLETED | OUTPATIENT
Start: 2019-01-01 | End: 2019-01-01

## 2019-01-01 RX ORDER — ALBUTEROL SULFATE 2.5 MG/3ML
2.5 SOLUTION RESPIRATORY (INHALATION) EVERY 6 HOURS PRN
Qty: 360 EACH | Refills: 3 | Status: SHIPPED | OUTPATIENT
Start: 2019-01-01

## 2019-01-01 RX ORDER — ALBUTEROL SULFATE 2.5 MG/3ML
2.5 SOLUTION RESPIRATORY (INHALATION) EVERY 6 HOURS PRN
Qty: 360 EACH | Refills: 3 | Status: CANCELLED | OUTPATIENT
Start: 2019-01-01

## 2019-01-01 RX ORDER — ESCITALOPRAM OXALATE 5 MG/1
TABLET ORAL
Qty: 90 TABLET | Refills: 1 | Status: SHIPPED | OUTPATIENT
Start: 2019-01-01 | End: 2019-01-01 | Stop reason: ALTCHOICE

## 2019-01-01 RX ORDER — POLYETHYLENE GLYCOL 3350 17 G/17G
17 POWDER, FOR SOLUTION ORAL DAILY
Status: DISCONTINUED | OUTPATIENT
Start: 2019-01-01 | End: 2019-01-01 | Stop reason: HOSPADM

## 2019-01-01 RX ORDER — MIRTAZAPINE 7.5 MG/1
7.5 TABLET, FILM COATED ORAL NIGHTLY
Qty: 30 TABLET | Refills: 3 | Status: SHIPPED | OUTPATIENT
Start: 2019-01-01

## 2019-01-01 RX ORDER — IPRATROPIUM BROMIDE 42 UG/1
SPRAY, METERED NASAL
Qty: 3 BOTTLE | Refills: 3 | Status: SHIPPED | OUTPATIENT
Start: 2019-01-01

## 2019-01-01 RX ORDER — HYDROCODONE BITARTRATE AND ACETAMINOPHEN 7.5; 325 MG/1; MG/1
1 TABLET ORAL 4 TIMES DAILY
Qty: 120 TABLET | Refills: 0 | Status: SHIPPED | OUTPATIENT
Start: 2019-10-08 | End: 2019-11-07

## 2019-01-01 RX ADMIN — SODIUM PHOSPHATE 1 ENEMA: 7; 19 ENEMA RECTAL at 12:31

## 2019-01-01 RX ADMIN — POLYETHYLENE GLYCOL 3350 17 G: 17 POWDER, FOR SOLUTION ORAL at 14:29

## 2019-01-01 RX ADMIN — IOPAMIDOL 90 ML: 755 INJECTION, SOLUTION INTRAVENOUS at 11:29

## 2019-01-01 ASSESSMENT — ENCOUNTER SYMPTOMS
ALLERGIC/IMMUNOLOGIC NEGATIVE: 1
BACK PAIN: 1
ALLERGIC/IMMUNOLOGIC NEGATIVE: 1
BACK PAIN: 1
EYES NEGATIVE: 1
EYE PAIN: 0
ALLERGIC/IMMUNOLOGIC NEGATIVE: 1
RESPIRATORY NEGATIVE: 1
SHORTNESS OF BREATH: 1
EYES NEGATIVE: 1
GASTROINTESTINAL NEGATIVE: 1
SHORTNESS OF BREATH: 0
BLOOD IN STOOL: 1
COLOR CHANGE: 0
ALLERGIC/IMMUNOLOGIC NEGATIVE: 1
RESPIRATORY NEGATIVE: 1
ABDOMINAL PAIN: 0
RESPIRATORY NEGATIVE: 1
RHINORRHEA: 0
EYES NEGATIVE: 1
ALLERGIC/IMMUNOLOGIC NEGATIVE: 1
NAUSEA: 0
EYE DISCHARGE: 0
BACK PAIN: 1
GASTROINTESTINAL NEGATIVE: 1
GASTROINTESTINAL NEGATIVE: 1
BACK PAIN: 0
GASTROINTESTINAL NEGATIVE: 1
RESPIRATORY NEGATIVE: 1
COUGH: 0
SORE THROAT: 0
ALLERGIC/IMMUNOLOGIC NEGATIVE: 1
ALLERGIC/IMMUNOLOGIC NEGATIVE: 1
RESPIRATORY NEGATIVE: 1
BACK PAIN: 1
EYES NEGATIVE: 1
PHOTOPHOBIA: 0
CONSTIPATION: 1
BACK PAIN: 1
EYES NEGATIVE: 1
APNEA: 0
COUGH: 1
EYES NEGATIVE: 1
ABDOMINAL DISTENTION: 0
GASTROINTESTINAL NEGATIVE: 1
EYES NEGATIVE: 1
BACK PAIN: 1
GASTROINTESTINAL NEGATIVE: 1
GASTROINTESTINAL NEGATIVE: 1

## 2019-01-01 ASSESSMENT — PAIN SCALES - GENERAL: PAINLEVEL_OUTOF10: 10

## 2019-01-01 ASSESSMENT — PAIN DESCRIPTION - PAIN TYPE: TYPE: ACUTE PAIN

## 2019-01-01 ASSESSMENT — PAIN DESCRIPTION - LOCATION: LOCATION: RECTUM

## 2019-05-03 NOTE — TELEPHONE ENCOUNTER
Patient has done something to her back this morning and is unable to make it to her apt today for her medication refill. Requesting to send it in today.

## 2019-06-10 NOTE — PROGRESS NOTES
Patient here forSUBJECTIVE:    Patient ID: Gladis Sierra is a 68 y. o.female. HPI:   Patient here for follow-up of multiple medical problems  Patient had chronic low back pain she takes hydrocodone for pain control. Pain medication is effective. He can keep same level of function. Denies medication side effect. More benign therapy has not been effective. She also have history of COPD. Her disease has been stable. She gets some more cough and congestion in the spring but symptoms have been unchanged. She have a new albuterol inhaler that is not as good as previous. She feels like the strength of the discharge is weaker. Patient have a nebulizer machine at home also. She had cachexia secondary to COPD. Her weights been stable. She continued to eat. She has history of depression and takes antidepressant. Symptoms have been stable. Denies any depression symptoms. Past Medical History:   Diagnosis Date    [facility]    Allergic rhinitis     Asthma 12/9/14    Dr. Bert Michael Cerebral artery occlusion     [facility]bral [facility])     Chronic back pain     [facility]     Depression     Hyperlipidemia     Hypertension     Hyponatremia     Osteoarthritis     Osteoporosis     Pneumonia       Current Outpatient Medications   Medication Sig Dispense Refill    HYDROcodone-acetaminophen (NORCO) 7.5-325 MG per tablet Take 1 tablet by mouth 4 times daily for 30 days.  Earliest fill date 4/5/19 120 tablet 0    tiotropium (SPIRIVA) 18 MCG inhalation capsule Inhale 1 capsule into the lungs daily 30 capsule 3    ipratropium (ATROVENT) 0.06 % nasal spray USE 2 SPRAYS IN EACH NOSTRIL 3 TIMES A DAY 3 Bottle 3    albuterol (PROVENTIL) (2.5 MG/3ML) 0.083% nebulizer solution Take 3 mLs by nebulization every 6 hours as needed for Wheezing 360 each 3    DULoxetine (CYMBALTA) 30 MG extended normal.   Nose: Nose normal.   Mouth/Throat: Oropharynx is clear and moist.   Eyes: Pupils are equal, round, and reactive to light. Conjunctivae and EOM are normal.   Neck: Normal range of motion. Neck supple. Cardiovascular: Normal rate, regular rhythm, normal heart sounds and intact distal pulses. Pulmonary/Chest: Effort normal. She has decreased breath sounds. Patient on oxygen. Abdominal: Soft. Bowel sounds are normal.   Musculoskeletal: Normal range of motion. Lumbar back: She exhibits pain. Neurological: She is alert and oriented to person, place, and time. She has normal reflexes. Skin: Skin is warm and dry. Psychiatric: She has a normal mood and affect. Her behavior is normal. Judgment and thought content normal.   Vitals reviewed. /72 (Site: Right Upper Arm, Position: Sitting, Cuff Size: Medium Adult)   Pulse 102   Temp 98.4 °F (36.9 °C) (Temporal)   Resp 22   Wt 72 lb (32.7 kg)   SpO2 90%   BMI 15.58 kg/m²      ASSESSMENT:     Diagnosis Orders   1. Chronic low back pain, unspecified back pain laterality, with sciatica presence unspecified-stable    2. Chronic obstructive pulmonary disease, unspecified COPD type (HCC)-stable    3. Cachexia (HCC)-stable    4. Major depressive disorder, single episode, mild (HCC)-stable         PLAN:    1.  Marisel Marie. Continue medications. 2.  Continue medications. Samples of Symbicort provided. 3.  Encourage p.o. intake. 4.  Continue antidepressant therapy.   Follow-up 1 month

## 2019-07-08 NOTE — PROGRESS NOTES
SUBJECTIVE:    Patient ID: Chang Valencia is a 68 y. o.female. HPI:   Patient here for follow-up of multiple medical problems  Patient is a 70-year-old white female to have chronic back pain. She takes hydrocodone for pain control. Pain medication is effective. Denies medication side effect. Requests a refill of medication. Last pill was today. Denies medication side effect. She also have previous history of hypertension. She is does not take blood pressure medicine. She did lose some weight and her blood pressure have been stable. Is been a long time since she had blood work. Patient also due for mammogram.  She have end-stage COPD. She will not be a surgical candidate for any kind of intervention. She does not want to screen for breast cancer. Past Medical History:   Diagnosis Date    [facility]ANCIA [facility]acute [facility]) ([facility] [address] 10/30/2017    Allergic rhinitis     Asthma 12/9/14    Dr. Mariaa Miranda Cerebral artery occlusion     Cerebral artery occlusion with [facility])     Chronic back pain     [facility]     Depression     Hyperlipidemia     Hypertension     Hyponatremia     Osteoarthritis     Osteoporosis     Pneumonia       Current Outpatient Medications   Medication Sig Dispense Refill    HYDROcodone-acetaminophen (NORCO) 7.5-325 MG per tablet Take 1 tablet by mouth 4 times daily for 30 days.  120 tablet 0    escitalopram (LEXAPRO) 5 MG tablet TAKE 1 TABLET BY MOUTH EVERY DAY 90 tablet 1    tiotropium (SPIRIVA) 18 MCG inhalation capsule Inhale 1 capsule into the lungs daily 30 capsule 3    ipratropium (ATROVENT) 0.06 % nasal spray USE 2 SPRAYS IN EACH NOSTRIL 3 TIMES A DAY 3 Bottle 3    albuterol (PROVENTIL) (2.5 MG/3ML) 0.083% nebulizer solution Take 3 mLs by nebulization every 6 hours as needed for Wheezing 360 each 3    DULoxetine (CYMBALTA) 30 MG extended release capsule Take 1 capsule by mouth daily 30 capsule 5   

## 2021-09-03 ENCOUNTER — OFFICE VISIT (OUTPATIENT)
Dept: URBAN - METROPOLITAN AREA CLINIC 16 | Facility: CLINIC | Age: 71
End: 2021-09-03
Payer: COMMERCIAL

## 2021-09-03 DIAGNOSIS — H43.392 OTHER VITREOUS OPACITIES, LEFT EYE: ICD-10-CM

## 2021-09-03 PROCEDURE — 99204 OFFICE O/P NEW MOD 45 MIN: CPT | Performed by: OPHTHALMOLOGY

## 2021-09-03 ASSESSMENT — VISUAL ACUITY
OS: 20/60
OD: 20/80

## 2021-09-03 ASSESSMENT — INTRAOCULAR PRESSURE
OS: 16
OD: 16

## 2021-09-03 ASSESSMENT — KERATOMETRY
OD: 43.75
OS: 43.38

## 2021-09-03 NOTE — IMPRESSION/PLAN
Impression: Age-related nuclear cataract, bilateral: H25.13. Rojas Razor Visually significant, quality of life issue, could improve with surgery. Plan: Cataracts account for the patient's complaints. Discussed all risks, benefits, alternatives, procedures and recovery. Patient understands changing glasses will not improve vision. Patient desires to have surgery, recommend phacoemulsification with intraocular lens implant OD.  lvl 2 - pt considering Vivity IOL - AIM plano. Re-evaluate OS for cataract surgery after OD is done. Not OK for Dexcyu.

## 2021-09-03 NOTE — IMPRESSION/PLAN
Impression: Other vitreous opacities, left eye: H43.392. Condition: new prob, no addtl w/u needed. Plan: vitreous floaters accounts for the patient's complaints. There is no evidence of retinal pathology. All signs and risks of retinal detachment and tears were discussed in detail. Patient instructed to call the office immediately if any symptoms noted. Recommend the patient return to office for follow up.

## 2021-10-11 ENCOUNTER — TESTING ONLY (OUTPATIENT)
Dept: URBAN - METROPOLITAN AREA CLINIC 23 | Facility: CLINIC | Age: 71
End: 2021-10-11
Payer: COMMERCIAL

## 2021-10-11 DIAGNOSIS — H25.13 AGE-RELATED NUCLEAR CATARACT, BILATERAL: Primary | ICD-10-CM

## 2021-10-11 ASSESSMENT — PACHYMETRY
OS: 23.36
OD: 3.41
OD: 23.37
OS: 3.20

## 2021-10-25 ENCOUNTER — SURGERY (OUTPATIENT)
Dept: URBAN - METROPOLITAN AREA SURGERY 11 | Facility: SURGERY | Age: 71
End: 2021-10-25
Payer: COMMERCIAL

## 2021-10-25 PROCEDURE — 66984 XCAPSL CTRC RMVL W/O ECP: CPT | Performed by: OPHTHALMOLOGY

## 2021-10-26 ENCOUNTER — POST-OPERATIVE VISIT (OUTPATIENT)
Dept: URBAN - METROPOLITAN AREA CLINIC 16 | Facility: CLINIC | Age: 71
End: 2021-10-26
Payer: COMMERCIAL

## 2021-10-26 DIAGNOSIS — Z48.810 ENCOUNTER FOR SURGICAL AFTERCARE FOLLOWING SURGERY ON A SENSE ORGAN: Primary | ICD-10-CM

## 2021-10-26 PROCEDURE — 99024 POSTOP FOLLOW-UP VISIT: CPT | Performed by: OPTOMETRIST

## 2021-10-26 ASSESSMENT — INTRAOCULAR PRESSURE
OD: 18
OS: 17

## 2021-10-26 NOTE — IMPRESSION/PLAN
Impression: S/P Cataract Extraction by phacoemulsification with IOL placement OD - 1 Day. Encounter for surgical aftercare following surgery on a sense organ  Z48.530.  Post operative instructions reviewed - Plan: --Continue Ofloxacin 0.3%

## 2021-11-05 ENCOUNTER — OFFICE VISIT (OUTPATIENT)
Dept: URBAN - METROPOLITAN AREA CLINIC 16 | Facility: CLINIC | Age: 71
End: 2021-11-05
Payer: COMMERCIAL

## 2021-11-05 DIAGNOSIS — H25.12 AGE-RELATED NUCLEAR CATARACT, LEFT EYE: Primary | ICD-10-CM

## 2021-11-05 PROCEDURE — 99213 OFFICE O/P EST LOW 20 MIN: CPT | Performed by: OPHTHALMOLOGY

## 2021-11-05 ASSESSMENT — INTRAOCULAR PRESSURE
OS: 18
OD: 19

## 2021-11-05 ASSESSMENT — VISUAL ACUITY: OS: 20/60

## 2021-11-08 ENCOUNTER — SURGERY (OUTPATIENT)
Dept: URBAN - METROPOLITAN AREA SURGERY 11 | Facility: SURGERY | Age: 71
End: 2021-11-08
Payer: COMMERCIAL

## 2021-11-08 PROCEDURE — 66984 XCAPSL CTRC RMVL W/O ECP: CPT | Performed by: OPHTHALMOLOGY

## 2021-11-09 ENCOUNTER — POST-OPERATIVE VISIT (OUTPATIENT)
Dept: URBAN - METROPOLITAN AREA CLINIC 16 | Facility: CLINIC | Age: 71
End: 2021-11-09
Payer: COMMERCIAL

## 2021-11-09 PROCEDURE — 99024 POSTOP FOLLOW-UP VISIT: CPT | Performed by: OPTOMETRIST

## 2021-11-09 ASSESSMENT — INTRAOCULAR PRESSURE
OD: 17
OS: 17

## 2021-11-09 NOTE — IMPRESSION/PLAN
Impression: S/P Cataract Extraction by phacoemulsification with IOL placement OS - 1 Day. Presence of intraocular lens  Z96.1.  Post operative instructions reviewed - Plan: --Continue Ofloxacin 0.3%--Taper Pred-Ketor as directed

## 2021-11-16 ENCOUNTER — POST-OPERATIVE VISIT (OUTPATIENT)
Dept: URBAN - METROPOLITAN AREA CLINIC 16 | Facility: CLINIC | Age: 71
End: 2021-11-16
Payer: COMMERCIAL

## 2021-11-16 DIAGNOSIS — Z96.1 PRESENCE OF INTRAOCULAR LENS: Primary | ICD-10-CM

## 2021-11-16 PROCEDURE — 99024 POSTOP FOLLOW-UP VISIT: CPT | Performed by: OPTOMETRIST

## 2021-11-16 ASSESSMENT — INTRAOCULAR PRESSURE
OS: 18
OD: 18

## 2021-11-16 NOTE — IMPRESSION/PLAN
Impression: S/P Cataract Extraction by phacoemulsification with IOL placement OS - 8 Days. Presence of intraocular lens  Z96.1. Plan: RTC 1 month x final PO. --Advised patient to use artificial tears for comfort. taper meds as directed.

## 2021-12-16 ENCOUNTER — POST-OPERATIVE VISIT (OUTPATIENT)
Dept: URBAN - METROPOLITAN AREA CLINIC 16 | Facility: CLINIC | Age: 71
End: 2021-12-16
Payer: COMMERCIAL

## 2021-12-16 PROCEDURE — 99024 POSTOP FOLLOW-UP VISIT: CPT | Performed by: OPTOMETRIST

## 2021-12-16 ASSESSMENT — INTRAOCULAR PRESSURE
OD: 16
OS: 16

## 2021-12-16 NOTE — IMPRESSION/PLAN
Impression: S/P Cataract Extraction by phacoemulsification with IOL placement OS - 38 Days. Presence of intraocular lens  Z96.1. Plan: RTC 1 year x CFM. --Advised patient to use artificial tears for comfort.

## 2023-02-23 NOTE — IMPRESSION/PLAN
Impression: Age-related nuclear cataract, left eye: H25.12. Burgess Vyas Visually significant, quality of life issue, could improve with surgery. Plan: Cataracts account for the patient's complaints. Discussed all risks, benefits, alternatives, procedures and recovery. Patient understands changing glasses will not improve vision. Patient desires to have surgery, recommend phacoemulsification with intraocular lens implant OS. lvl 2 - pt declines premium IOL -  AIM plano. Hydroxyzine Pregnancy And Lactation Text: This medication is not safe during pregnancy and should not be taken. It is also excreted in breast milk and breast feeding isn't recommended.

## (undated) DEVICE — SMARTGOWN BREATHABLE SURGICAL GOWN: Brand: CONVERTORS

## (undated) DEVICE — THREE QUARTER SHEET: Brand: CONVERTORS

## (undated) DEVICE — C-ARMOR C-ARM EQUIPMENT COVERS CLEAR STERILE UNIVERSAL FIT 12 PER CASE: Brand: C-ARMOR

## (undated) DEVICE — SURGICAL PROCEDURE PACK LOWER EXTREMITY LOURDES HOSP

## (undated) DEVICE — GLOVE SURG SZ 75 L12IN FNGR THK94MIL TRNSLUC YEL LTX

## (undated) DEVICE — GLOVE SURG SZ 75 L12IN FNGR THK87MIL DK GRN LTX FREE ISOLEX

## (undated) DEVICE — GLOVE SURG SZ 85 L12IN FNGR THK13MIL BRN LTX SYN POLYMER W

## (undated) DEVICE — SOLUTION IV IRRIG POUR BRL 0.9% SODIUM CHL 2F7124

## (undated) DEVICE — KIT CARE PATIENT HANA PROFX

## (undated) DEVICE — CHLORAPREP 26ML ORANGE

## (undated) DEVICE — 6617 IOBAN II PATIENT ISOLATION DRAPE 5/BX,4BX/CS: Brand: STERI-DRAPE™ IOBAN™ 2

## (undated) DEVICE — DISCONTINUED NO SUB IDED TG GLOVE SURG SENSICARE ALOE LT LF PF ST GRN SZ 8

## (undated) DEVICE — SUTURE VCRL SZ 2-0 L36IN ABSRB UD L36MM CT-1 1/2 CIR J945H

## (undated) DEVICE — PAD,ABDOMINAL,5"X9",STERILE,LF,1/PK: Brand: MEDLINE INDUSTRIES, INC.

## (undated) DEVICE — Device

## (undated) DEVICE — GLOVE SURG SZ 85 L12IN FNGR THK94MIL TRNSLUC YEL LTX

## (undated) DEVICE — SUTURE SZ 0 27IN 5/8 CIR UR-6  TAPER PT VIOLET ABSRB VICRYL J603H

## (undated) DEVICE — C-ARM: Brand: UNBRANDED

## (undated) DEVICE — GLOVE SURG SZ 85 L12IN FNGR THK87MIL DK GRN LTX FREE ISOLEX